# Patient Record
Sex: FEMALE | Race: WHITE | NOT HISPANIC OR LATINO | Employment: FULL TIME | URBAN - METROPOLITAN AREA
[De-identification: names, ages, dates, MRNs, and addresses within clinical notes are randomized per-mention and may not be internally consistent; named-entity substitution may affect disease eponyms.]

---

## 2017-07-05 ENCOUNTER — ALLSCRIPTS OFFICE VISIT (OUTPATIENT)
Dept: OTHER | Facility: OTHER | Age: 52
End: 2017-07-05

## 2017-07-14 ENCOUNTER — GENERIC CONVERSION - ENCOUNTER (OUTPATIENT)
Dept: OTHER | Facility: OTHER | Age: 52
End: 2017-07-14

## 2017-09-21 ENCOUNTER — GENERIC CONVERSION - ENCOUNTER (OUTPATIENT)
Dept: OTHER | Facility: OTHER | Age: 52
End: 2017-09-21

## 2017-10-26 ENCOUNTER — GENERIC CONVERSION - ENCOUNTER (OUTPATIENT)
Dept: OTHER | Facility: OTHER | Age: 52
End: 2017-10-26

## 2018-01-13 VITALS
HEART RATE: 79 BPM | SYSTOLIC BLOOD PRESSURE: 128 MMHG | TEMPERATURE: 97.1 F | DIASTOLIC BLOOD PRESSURE: 80 MMHG | BODY MASS INDEX: 22.28 KG/M2 | WEIGHT: 130.5 LBS | OXYGEN SATURATION: 97 % | RESPIRATION RATE: 16 BRPM | HEIGHT: 64 IN

## 2018-01-22 ENCOUNTER — ALLSCRIPTS OFFICE VISIT (OUTPATIENT)
Dept: OTHER | Facility: OTHER | Age: 53
End: 2018-01-22

## 2018-01-22 DIAGNOSIS — M25.551 PAIN IN RIGHT HIP: ICD-10-CM

## 2018-01-23 ENCOUNTER — APPOINTMENT (OUTPATIENT)
Dept: RADIOLOGY | Facility: MEDICAL CENTER | Age: 53
End: 2018-01-23
Payer: COMMERCIAL

## 2018-01-23 DIAGNOSIS — M25.551 PAIN IN RIGHT HIP: ICD-10-CM

## 2018-01-23 PROCEDURE — 73502 X-RAY EXAM HIP UNI 2-3 VIEWS: CPT

## 2018-01-23 NOTE — PROGRESS NOTES
Assessment   1  Right hip pain (969 45) (M25 551)   2  Anxiety (300 00) (F41 9)    Plan   Anxiety    · ALPRAZolam 0 25 MG Oral Tablet; take 1 tablet daily as needed  Right hip pain    · PrednisoLONE 15 MG/5ML Oral Solution; take 20mL PO daily x 2 days, then 15mL    PO daily x 2 days, then 10mL PO daily x 2 days, then 5mL PO daily x    2 days   · * XR HIP/PELV 2-3 VWS RIGHT W PELVIS IF PERFORMED; Status:Active; Requested    for:22Jan2018; Discussion/Summary      50-year-old female here today for concern of 3 months of right hip pain and has 3-4 episodes of some numbness and tingling in her right calf and foot mainly when stationary since her hip pain is started  Her exam is relatively unremarkable today aside from some right lateral hip tenderness but has normal range of motion of her lumbar spine and hip without instability or decreased range of motion  I do suspect possible bursitis is patient is very active with running, walking and skiing, less likely IT band syndrome as she has no lateral thigh or knee pain  At this point I will have patient start a prednisone taper as directed  I advised only acetaminophen or Tylenol in addition with risk of NSAID use with steroids discussed  advised to take with food  She was encouraged to rest from her physical activities at least for the next week, limiting to walking and stretching and avoiding running, skiing or other high impact activity or exercise  She should started ice regimen to the right lateral hip applied 20 minutes 3 to 4 times a day  I also provided her with a groin and hip stretching handout that she should perform at least 2 to 3 times a day  I will have her obtain an x-ray of the right hip two view for any obvious abnormalities  I told patient we will call her with results when they become available and follow up with her at that time   Depending on how her x-ray turns out and how she responds to medication, I may consider treatment with some physical therapy  Possible side effects of new medications were reviewed with the patient/guardian today  The treatment plan was reviewed with the patient/guardian  The patient/guardian understands and agrees with the treatment plan      Chief Complaint   Pt c/o R hip pain and tingling in the back or her R foot/calf x 1 week  History of Present Illness   HPI: 49y/o female here today for right hip pain for past few months, no trauma  no hx of hip pain  she is active walking, running, skiing and no problems  Hip pain is all the time, movements make it worse, specifically getting up from sitting to standing  also has happened episode at night that right foot and calf tingling, had similar episode today at work while standing  pain concentrated right lateral hip, tender to touch  tried ibuprofen  Patient also requesting refill for Xanax  She states that at times she has to work on her computer late and then has difficulty falling asleep after  She takes it on a rare basis, last filled August 2017  she states that she takes half of a 0 25 mg tablet  Review of Systems        Constitutional: No fever, no chills, feels well, no tiredness, no recent weight gain or loss  Musculoskeletal: as noted in HPI  Integumentary: no complaints of skin rash or lesion, no itching or dry skin, no skin wounds  Neurological: as noted in HPI  Other Symptoms: psych sxs as above  Active Problems   1  Anxiety (300 00) (F41 9)   2  Asthma (493 90) (J45 909)   3  Colon cancer screening (V76 51) (Z12 11)    Past Medical History   1  History of Dysuria (788 1) (R30 0)   2  History of acute bronchitis (V12 69) (Z87 09)   3  History of acute sinusitis (V12 69) (Z87 09)   4  History of cough   5  History of esophageal spasm (V12 79) (Z87 19)   6  History of fatigue (V13 89) (Z87 898)   7  History of skin disorder (V13 3) (Z87 2)   8  History of Left knee pain (719 46) (M25 562)   9   History of Visit For:    Social History    · Never A Smoker  The social history was reviewed and updated today  Current Meds    1  ALPRAZolam 0 25 MG Oral Tablet; take 1 tablet daily as needed; Therapy: [id number] to (Last Rx:[id number]) Ordered   2  Dulera 100-5 MCG/ACT Inhalation Aerosol; INHALE 2 PUFFS Twice daily Recorded   3  Nitrostat 0 4 MG Sublingual Tablet Sublingual; TAKE AS DIRECTED; Therapy: 85PEU7519 to (Evaluate:10Jun2013); Last Rx:[id number] Recorded   4  Omeprazole 20 MG Oral Tablet Delayed Release; TAKE 1 CAPSULE Daily Recorded   5  Ventolin  (90 Base) MCG/ACT Inhalation Aerosol Solution; INHALE 1 TO 2     PUFFS EVERY 4 TO 6 HOURS AS NEEDED; Therapy: [id number] to (Last Rx:[id number])  Requested for: [id number] Ordered     The medication list was reviewed and updated today  Allergies   1  Codeine Derivatives   2  Bactrim DS TABS    Vitals    Recorded: 94YKS3086 06:13PM   Temperature 98 4 F, Tympanic   Heart Rate 89   Respiration Quality Normal   Respiration 16   Systolic 948, LUE, Sitting   Diastolic 78, LUE, Sitting   Weight 135 lb 4 oz   BMI Calculated 23 07   BSA Calculated 1 66   O2 Saturation 99, RA   Pain Scale 5     Physical Exam        Constitutional      General appearance: No acute distress, well appearing and well nourished  appears healthy,-- within normal limits of ideal weight-- and-- appearance reflects stated age  Musculoskeletal      Gait and station: Normal        Inspection/palpation of joints, bones, and muscles: Abnormal  -- Right hip, spine and lower extremity appear normal to inspection without redness, swelling, ecchymosis or rash  There is no tenderness to palpation or palpable abnormality of the lumbar spine associated paraspinals  There is some slight tenderness to palpation in the right lateral hip joint  She has no groin tenderness  She has full range of motion of her lumbar spine in all directions without pain   She has full range of motion of her right hip in all directions without pain  Strength in her lower extremity is intact and symmetric to left  Psychiatric      Orientation to person, place, and time: Normal        Mood and affect: Normal        Additional Exam:  Vitals reviewed  Signatures    Electronically signed by : MARIPOSA Drummond; Jan 22 2018  6:52PM EST                       (Author)     Electronically signed by :  Joon Lowe DO; Jan 22 2018  9:14PM EST                       (Author)

## 2018-01-26 ENCOUNTER — TELEPHONE (OUTPATIENT)
Dept: FAMILY MEDICINE CLINIC | Facility: CLINIC | Age: 53
End: 2018-01-26

## 2018-01-26 NOTE — TELEPHONE ENCOUNTER
Please tell pt her xray is still "in process" meaning it is not read yet as of 645PM before I left the office  I will recheck Monday when I return

## 2018-01-30 DIAGNOSIS — M25.751 OSTEOPHYTE, RIGHT HIP: ICD-10-CM

## 2018-01-30 DIAGNOSIS — G89.29 CHRONIC RIGHT HIP PAIN: Primary | ICD-10-CM

## 2018-01-30 DIAGNOSIS — M25.551 CHRONIC RIGHT HIP PAIN: Primary | ICD-10-CM

## 2018-01-30 NOTE — TELEPHONE ENCOUNTER
I spoke with patient directly discussing x-ray results of her hip  There were iliac crest traction osteophytes found  Patient states that she was starting to feel better on ibuprofen, rest, ice and stretching  She did ski over the weekend and felt well  However she started her walking routine again and the pain is back  She does not notice the pain with squatting or skiing but does notice with basic movements like walking  At this point with the abnormality on the x-ray recommended 2nd opinion by an orthopedic  Patient asked for Dr Amy Renee, contact information given  Order in computer  Patient will continue ibuprofen with food as tolerated, gentle stretches, ice/heat as  Needed and will make appointment with Orthopod for further evaluation  I did advise patient confirm appointment with us so we can fax over pertinent information

## 2018-02-22 ENCOUNTER — EVALUATION (OUTPATIENT)
Dept: PHYSICAL THERAPY | Facility: CLINIC | Age: 53
End: 2018-02-22
Payer: COMMERCIAL

## 2018-02-22 DIAGNOSIS — M76.31 ILIOTIBIAL BAND SYNDROME OF RIGHT SIDE: Primary | ICD-10-CM

## 2018-02-22 PROCEDURE — G8978 MOBILITY CURRENT STATUS: HCPCS

## 2018-02-22 PROCEDURE — G8979 MOBILITY GOAL STATUS: HCPCS

## 2018-02-22 PROCEDURE — 97161 PT EVAL LOW COMPLEX 20 MIN: CPT

## 2018-02-22 RX ORDER — ALPRAZOLAM 0.25 MG/1
TABLET ORAL
COMMUNITY
End: 2018-08-10 | Stop reason: SDUPTHER

## 2018-02-22 RX ORDER — AMOXICILLIN 500 MG/1
500 CAPSULE ORAL EVERY 8 HOURS SCHEDULED
COMMUNITY
End: 2018-03-11

## 2018-02-22 RX ORDER — OMEPRAZOLE 20 MG/1
20 CAPSULE, DELAYED RELEASE ORAL DAILY
COMMUNITY

## 2018-02-22 RX ORDER — NITROGLYCERIN 0.6 MG/1
0.6 TABLET SUBLINGUAL
COMMUNITY
End: 2018-09-27 | Stop reason: SDUPTHER

## 2018-02-22 RX ORDER — CEFUROXIME AXETIL 500 MG/1
500 TABLET ORAL EVERY 12 HOURS SCHEDULED
COMMUNITY
End: 2018-03-11

## 2018-02-22 NOTE — PROGRESS NOTES
PT Evaluation     Today's date: 2018  Patient name: William Parnell  : 1965  MRN: [id number]  Referring provider: Sage Lin DO  Dx:   Encounter Diagnosis     ICD-10-CM    1  Iliotibial band syndrome of right side M76 31                   Assessment  Impairments: abnormal or restricted ROM, activity intolerance, impaired physical strength and pain with function  Functional limitations: difficulty ambulating/stair climbing, pain with standing long periods of time  Assessment details: William Parnell is a 47 y o  female presenting to PT with pain, decreased hip range of motion, decreased LE strength, and decreased tolerance to activity consistent with ITB syndrome and tightness, with associated bursitis  These impairments cause associated limitations in functional abilities  Patient would benefit from skilled PT services to address these impairments and to maximize function in order to improve quality of life  Thank you for the referral   Understanding of Dx/Px/POC: good   Prognosis: good    Goals  STG  1) R hip ROM will improve 4 in  weeks  2) B/L knee strength will improve 1/2 grade in 4 weeks  3) B/L hip strength will improve 1/2 grade in 4 weeks  LTG  1) Patient is independent in HEP  2) Patient will ascend/descend one flight of stairs independently with no increased pain in 8 weeks  3) Ambulation will be improved to PLOF in 8 weeks  4) Recreational performance will be improved to PLOF in 8 weeks      Plan  Patient would benefit from: skilled PT  Planned modality interventions: cryotherapy and thermotherapy: hydrocollator packs  Planned therapy interventions: home exercise program, therapeutic exercise, therapeutic activities, stretching, strengthening, patient education, neuromuscular re-education, manual therapy and joint mobilization  Frequency: 1x week  Duration in weeks: 8  Treatment plan discussed with: patient        Subjective Evaluation    History of Present Illness  Mechanism of injury: Patient presents with R hip pain which has lasted about 4 months, denies trauma to cause the pain to start  She reports hx of R LCL sprain about 4 years ago, and believes this may be contributing to development of ITB tightness  She enjoys walking daily, however notes that this activity causes increase in hip pain  She also reports occasional numbness in sole of her R foot, usually coming on after standing on it for a while at work  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 her leg in order for the numbness to subside  Quality of life: good    Pain  Current pain rating: 3  At best pain ratin  At worst pain ratin  Quality: dull ache and discomfort  Relieving factors: change in position and rest  Aggravating factors: walking, stair climbing and running  Progression: no change      Diagnostic Tests  X-ray: normal (osteophytes on R iliac crest)  Patient Goals  Patient goals for therapy: decreased pain, return to sport/leisure activities and increased strength          Objective     Palpation     Right   No palpable tenderness to the gluteus luis angel, gluteus medius, piriformis and TFL  Additional Palpation Details  Tenderness and hypertonicity of distal R ITB, just proximal to insertion onto tibia    Tenderness     Left Hip   No tenderness in the iliac crest      Right Hip   No tenderness in the iliac crest      Lumbar Screen  Lumbar range of motion within normal limits  Neurological Testing     Additional Neurological Details  Sensation intact and reflexes normal throughout      Active Range of Motion   Left Hip   Normal active range of motion  Adduction: 20 degrees   Internal rotation (90/90): 25 degrees     Right Hip   Normal active range of motion  Adduction: 15 degrees   Internal rotation (90/90): 20 degrees     Strength/Myotome Testing     Right Hip   Planes of Motion   Flexion: 4  Extension: 4-  Abduction: 4-    Isolated Muscles   Gluteus maximums: 4-    Additional Strength Details  Knee strength 4+/5 B/L    Tests     Right Hip   Positive modified Lenton Gilbert and Lenton Gilbert  Negative EKTA, FADIR and piriformis  SLR: Negative       General Comments     Hip Comments   FOTO score: 60      Flowsheet Rows    Flowsheet Row Most Recent Value   PT/OT G-Codes   Current Score  60   Projected Score  73   FOTO information reviewed  Yes   Assessment Type  Evaluation   G code set  Mobility: Walking & Moving Around   Mobility: Walking and Moving Around Current Status ()  CK   Mobility: Walking and Moving Around Goal Status ()  CJ          Precautions: none    Daily Treatment Diary     Manual              Roller to TFL/ITB                                                                     Exercise Diary              Hook lying hip abduction             Hook lying hip adduction             Glute bridge             SLR flex             SLR abduction             Clam shells             Standing ITB stretch             Iso hip hike             Pball at back squats             Walking march             Side steps with band around thighs                                                                                                                         Modalities              CP/MHP prn

## 2018-02-22 NOTE — LETTER
2018    Susi Sheth DO  [address] [facility]    Patient: Brandy Santos   YOB: 1965   Date of Visit: 2018     Encounter Diagnosis     ICD-10-CM    1  Iliotibial band syndrome of right side M76 31        Dear Dr Petrona Ortiz:    Please review the attached Plan of Care from [facility] recent visit  Please verify that you agree therapy should continue by signing the attached document and sending it back to our office  If you have any questions or concerns, please don't hesitate to call  Sincerely,    Cynthia Tan, PT      Referring Provider:      I certify that I have read the below Plan of Care and certify the need for these services furnished under this plan of treatment while under my care  Susi Sheth DO  [address] [place] [address]: [phone]          PT Evaluation     Today's date: 2018  Patient name: Janice Nielson  : 1965  MRN: [id number]  Referring provider: Hema Urbano DO  Dx:   Encounter Diagnosis     ICD-10-CM    1  Iliotibial band syndrome of right side M76 31                   Assessment  Impairments: abnormal or restricted ROM, activity intolerance, impaired physical strength and pain with function  Functional limitations: difficulty ambulating/stair climbing, pain with standing long periods of time  Assessment details: Janice Nielson is a 47 y o  female presenting to PT with pain, decreased hip range of motion, decreased LE strength, and decreased tolerance to activity consistent with ITB syndrome and tightness, with associated bursitis  These impairments cause associated limitations in functional abilities  Patient would benefit from skilled PT services to address these impairments and to maximize function in order to improve quality of life   Thank you for the referral   Understanding of Dx/Px/POC: good   Prognosis: good    Goals  STG  1) R hip ROM will improve 4 in weeks  2) B/L knee strength will improve 1/2 grade in 4 weeks  3) B/L hip strength will improve 1/2 grade in 4 weeks  LTG  1) Patient is independent in HEP  2) Patient will ascend/descend one flight of stairs independently with no increased pain in 8 weeks  3) Ambulation will be improved to PLOF in 8 weeks  4) Recreational performance will be improved to PLOF in 8 weeks  Plan  Patient would benefit from: skilled PT  Planned modality interventions: cryotherapy and thermotherapy: hydrocollator packs  Planned therapy interventions: home exercise program, therapeutic exercise, therapeutic activities, stretching, strengthening, patient education, neuromuscular re-education, manual therapy and joint mobilization  Frequency: 1x week  Duration in weeks: 8  Treatment plan discussed with: patient        Subjective Evaluation    History of Present Illness  Mechanism of injury: Patient presents with R hip pain which has lasted about 4 months, denies trauma to cause the pain to start  She reports hx of R LCL sprain about 4 years ago, and believes this may be contributing to development of ITB tightness  She enjoys walking daily, however notes that this activity causes increase in hip pain  She also reports occasional numbness in sole of her R foot, usually coming on after standing on it for a while at work  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 her leg in order for the numbness to subside    Quality of life: good    Pain  Current pain rating: 3  At best pain ratin  At worst pain ratin  Quality: dull ache and discomfort  Relieving factors: change in position and rest  Aggravating factors: walking, stair climbing and running  Progression: no change      Diagnostic Tests  X-ray: normal (osteophytes on R iliac crest)  Patient Goals  Patient goals for therapy: decreased pain, return to sport/leisure activities and increased strength          Objective     Palpation     Right   No palpable tenderness to the gluteus luis angel, gluteus medius, piriformis and TFL  Additional Palpation Details  Tenderness and hypertonicity of distal R ITB, just proximal to insertion onto tibia    Tenderness     Left Hip   No tenderness in the iliac crest      Right Hip   No tenderness in the iliac crest      Lumbar Screen  Lumbar range of motion within normal limits  Neurological Testing     Additional Neurological Details  Sensation intact and reflexes normal throughout  Active Range of Motion   Left Hip   Normal active range of motion  Adduction: 20 degrees   Internal rotation (90/90): 25 degrees     Right Hip   Normal active range of motion  Adduction: 15 degrees   Internal rotation (90/90): 20 degrees     Strength/Myotome Testing     Right Hip   Planes of Motion   Flexion: 4  Extension: 4-  Abduction: 4-    Isolated Muscles   Gluteus maximums: 4-    Additional Strength Details  Knee strength 4+/5 B/L    Tests     Right Hip   Positive modified Nikole Hush and Sam  Negative EKTA, FADIR and piriformis  SLR: Negative       General Comments     Hip Comments   FOTO score: 60      Flowsheet Rows    Flowsheet Row Most Recent Value   PT/OT G-Codes   Current Score  60   Projected Score  73   FOTO information reviewed  Yes   Assessment Type  Evaluation   G code set  Mobility: Walking & Moving Around   Mobility: Walking and Moving Around Current Status ()  CK   Mobility: Walking and Moving Around Goal Status ()  CJ          Precautions: none    Daily Treatment Diary     Manual              Roller to TFL/ITB                                                                     Exercise Diary              Hook lying hip abduction             Hook lying hip adduction             Glute bridge             SLR flex             SLR abduction             Clam shells             Standing ITB stretch             Iso hip hike             Pball at back squats             Walking march             Side steps with band around thighs Modalities              CP/MHP prn

## 2018-03-08 ENCOUNTER — APPOINTMENT (OUTPATIENT)
Dept: PHYSICAL THERAPY | Facility: CLINIC | Age: 53
End: 2018-03-08
Payer: COMMERCIAL

## 2018-03-11 ENCOUNTER — OFFICE VISIT (OUTPATIENT)
Dept: FAMILY MEDICINE CLINIC | Facility: CLINIC | Age: 53
End: 2018-03-11
Payer: COMMERCIAL

## 2018-03-11 VITALS
HEIGHT: 63 IN | BODY MASS INDEX: 24.03 KG/M2 | OXYGEN SATURATION: 97 % | RESPIRATION RATE: 16 BRPM | TEMPERATURE: 97.9 F | WEIGHT: 135.6 LBS | SYSTOLIC BLOOD PRESSURE: 132 MMHG | HEART RATE: 97 BPM | DIASTOLIC BLOOD PRESSURE: 76 MMHG

## 2018-03-11 DIAGNOSIS — M54.50 ACUTE LEFT-SIDED LOW BACK PAIN WITHOUT SCIATICA: Primary | ICD-10-CM

## 2018-03-11 PROCEDURE — 99214 OFFICE O/P EST MOD 30 MIN: CPT | Performed by: FAMILY MEDICINE

## 2018-03-11 RX ORDER — CYCLOBENZAPRINE HCL 5 MG
5 TABLET ORAL
Qty: 20 TABLET | Refills: 0 | Status: SHIPPED | OUTPATIENT
Start: 2018-03-11 | End: 2018-03-11 | Stop reason: SDUPTHER

## 2018-03-11 RX ORDER — CYCLOBENZAPRINE HCL 5 MG
5 TABLET ORAL
Qty: 20 TABLET | Refills: 0 | Status: SHIPPED | OUTPATIENT
Start: 2018-03-11 | End: 2018-04-04 | Stop reason: SDUPTHER

## 2018-03-11 NOTE — PROGRESS NOTES
Assessment/Plan:   1  Acute left-sided low back pain without sciatica  Patient's symptoms today appear likely secondary to acute strain  At this time, hold off on checking x-ray imaging  Start supportive care  Maintain hydration  Elevate legs at nighttime  May alternate between he denies  She was given a prescription for Flexeril to take  She states that she does have prednisone from a previous hip strain  She did not take this treatment  At this time, she was instructed on the proper use of her prednisone  if any symptoms should worsen, she was advised to follow up  - cyclobenzaprine (FLEXERIL) 5 mg tablet; Take 1 tablet (5 mg total) by mouth daily at bedtime  Dispense: 20 tablet; Refill: 0  She   Diagnoses and all orders for this visit:    Acute left-sided low back pain without sciatica  -     Discontinue: cyclobenzaprine (FLEXERIL) 5 mg tablet; Take 1 tablet (5 mg total) by mouth daily at bedtime  -     cyclobenzaprine (FLEXERIL) 5 mg tablet; Take 1 tablet (5 mg total) by mouth daily at bedtime    Other orders  -     Mometasone Furo-Formoterol Fum (DULERA) 100-5 MCG/ACT AERO; Inhale 2 puffs 2 (two) times a day          Subjective:  Chief Complaint   Patient presents with    Back Pain     x10d Pt states she took ibuprofen w/ little relief   Hip Pain     x10d    Leg Pain     bilateral x10d      Patient ID: Meggan Salcedo is a 47 y o  female  Back Pain   This is a new problem  The current episode started in the past 7 days  The problem occurs constantly  The problem is unchanged  The pain is present in the lumbar spine  The quality of the pain is described as aching  The pain radiates to the left thigh  The pain is at a severity of 7/10  The pain is mild  Associated symptoms include leg pain   Pertinent negatives include no abdominal pain, bladder incontinence, bowel incontinence, chest pain, dysuria, fever, headaches, numbness, pelvic pain, perianal numbness, tingling, weakness or weight loss  She has tried NSAIDs for the symptoms  The treatment provided mild relief  Hip Pain    Pertinent negatives include no numbness or tingling  Leg Pain    Pertinent negatives include no numbness or tingling  Review of Systems   Constitutional: Negative for activity change, chills, fatigue, fever and weight loss  HENT: Negative for congestion, ear pain, sinus pressure and sore throat  Eyes: Negative for redness, itching and visual disturbance  Respiratory: Negative for cough and shortness of breath  Cardiovascular: Negative for chest pain and palpitations  Gastrointestinal: Negative for abdominal pain, bowel incontinence, diarrhea and nausea  Endocrine: Negative for cold intolerance and heat intolerance  Genitourinary: Negative for bladder incontinence, dysuria, flank pain, frequency and pelvic pain  Musculoskeletal: Positive for back pain  Negative for arthralgias, gait problem and myalgias  Skin: Negative for color change  Allergic/Immunologic: Negative for environmental allergies  Neurological: Negative for dizziness, tingling, weakness, numbness and headaches  Psychiatric/Behavioral: Negative for behavioral problems and sleep disturbance  The following portions of the patient's history were reviewed and updated as appropriate : past family history, past medical history, past social history and past surgical history  Objective:  Vitals:    03/11/18 1039   BP: 132/76   BP Location: Left arm   Patient Position: Sitting   Cuff Size: Standard   Pulse: 97   Resp: 16   Temp: 97 9 °F (36 6 °C)   TempSrc: Tympanic   SpO2: 97%   Weight: 61 5 kg (135 lb 9 6 oz)   Height: 5' 3 2" (1 605 m)        Physical Exam   Constitutional: She is oriented to person, place, and time  She appears well-developed and well-nourished  HENT:   Head: Normocephalic and atraumatic  Nose: Nose normal    Mouth/Throat: No oropharyngeal exudate     Eyes: Pupils are equal, round, and reactive to light  Right eye exhibits no discharge  Left eye exhibits no discharge  Neck: Normal range of motion  Neck supple  No tracheal deviation present  Cardiovascular: Normal rate, regular rhythm and intact distal pulses  Exam reveals no gallop and no friction rub  No murmur heard  Pulses:       Dorsalis pedis pulses are 2+ on the right side, and 2+ on the left side  Posterior tibial pulses are 2+ on the right side, and 2+ on the left side  Pulmonary/Chest: Effort normal and breath sounds normal  No respiratory distress  She has no wheezes  She has no rales  Abdominal: Soft  Bowel sounds are normal  She exhibits no distension  There is no tenderness  There is no rebound and no guarding  Musculoskeletal: She exhibits no edema  Lumbar back: She exhibits decreased range of motion, tenderness and spasm  She exhibits no swelling  Back:    Lymphadenopathy:        Head (right side): No submental and no submandibular adenopathy present  Head (left side): No submental and no submandibular adenopathy present  She has no cervical adenopathy  Right cervical: No superficial cervical, no deep cervical and no posterior cervical adenopathy present  Left cervical: No superficial cervical, no deep cervical and no posterior cervical adenopathy present  Neurological: She is alert and oriented to person, place, and time  No cranial nerve deficit or sensory deficit  Skin: Skin is warm, dry and intact  Psychiatric: Her speech is normal and behavior is normal  Judgment normal  Her mood appears not anxious  Cognition and memory are normal  She does not exhibit a depressed mood  Vitals reviewed

## 2018-03-19 ENCOUNTER — TRANSCRIBE ORDERS (OUTPATIENT)
Dept: ADMINISTRATIVE | Facility: HOSPITAL | Age: 53
End: 2018-03-19

## 2018-03-19 DIAGNOSIS — M76.31 ILIOTIBIAL BAND SYNDROME OF RIGHT SIDE: Primary | ICD-10-CM

## 2018-03-22 ENCOUNTER — OFFICE VISIT (OUTPATIENT)
Dept: PHYSICAL THERAPY | Facility: CLINIC | Age: 53
End: 2018-03-22
Payer: COMMERCIAL

## 2018-03-22 DIAGNOSIS — M53.3 SI (SACROILIAC) JOINT DYSFUNCTION: ICD-10-CM

## 2018-03-22 DIAGNOSIS — M76.31 ILIOTIBIAL BAND SYNDROME OF RIGHT SIDE: Primary | ICD-10-CM

## 2018-03-22 PROCEDURE — 97112 NEUROMUSCULAR REEDUCATION: CPT

## 2018-03-22 PROCEDURE — 97110 THERAPEUTIC EXERCISES: CPT

## 2018-03-22 PROCEDURE — G8979 MOBILITY GOAL STATUS: HCPCS

## 2018-03-22 PROCEDURE — 97164 PT RE-EVAL EST PLAN CARE: CPT

## 2018-03-22 PROCEDURE — G8978 MOBILITY CURRENT STATUS: HCPCS

## 2018-03-22 NOTE — LETTER
2018    Fernandojuan Ratliff DO  [address] [facility]    Patient: Flora Santos   YOB: 1965   Date of Visit: 3/22/2018     Encounter Diagnosis     ICD-10-CM    1  Iliotibial band syndrome of right side M76 31    2  SI (sacroiliac) joint dysfunction M53 3        Dear Dr Marino Bae:    Please review the attached Plan of Care from [facility] recent visit  Please verify that you agree therapy should continue by signing the attached document and sending it back to our office  If you have any questions or concerns, please don't hesitate to call  Sincerely,    Suhail Tan, PT      Referring Provider:      I certify that I have read the below Plan of Care and certify the need for these services furnished under this plan of treatment while under my care  Fernando Ratliff DO  [address]: [phone]          PT Re-Evaluation     Today's date: 3/22/2018  Patient name: Joan Parikh  : 1965  MRN: [id number]  Referring provider: Star Smith DO  Dx:   Encounter Diagnosis     ICD-10-CM    1  Iliotibial band syndrome of right side M76 31    2  SI (sacroiliac) joint dysfunction M53 3                   Assessment  Impairments: impaired physical strength and pain with function  Functional limitations: pain with walking greater than 20 minutes on uneven surfaces  Assessment details: Joan Parikh has been compliant with attending PT and HEP since initial eval  Joan Parikh has made improvements in objective data since IE and ITB pain has resolved  She presents now with acute L SIJ dysfunction pain and would benefit from additional skilled PT to address these deficits to return to PLOF  Understanding of Dx/Px/POC: good   Prognosis: good    Goals  STG  1) R hip ROM will improve in 4 weeks  -Met  2) B/L knee strength will improve 1/2 grade in 4 weeks  -Met  3) B/L hip strength will improve 1/2 grade in 4 weeks  -Met  LTG  1) Patient is independent in [facility]  -Partially met  2) Patient will ascend/descend one flight of stairs independently with no increased pain in 8 weeks  -Met  3) Ambulation will be improved to PLOF in 8 weeks  -Partially met  4) Recreational performance will be improved to PLOF in 8 weeks  -Partially met    Plan  Patient would benefit from: skilled PT  Planned modality interventions: cryotherapy and thermotherapy: hydrocollator packs  Planned therapy interventions: home exercise program, therapeutic exercise, therapeutic activities, stretching, strengthening, patient education, neuromuscular re-education, manual therapy and joint mobilization  Frequency: 1x week  Duration in weeks: 4  Treatment plan discussed with: patient        Subjective Evaluation    History of Present Illness  Mechanism of injury: Patient presents 4 weeks after IE, reporting resolution of R ITB pain and has gotten back to her daily morning walks without increase in R hip/thigh pain  She does now admit that 2 weeks ago she jammed her LLE when she was walking down steps in the dark, and woke up the next morning with severe pain over L PSIS which lasted until 2 days ago  She had consult with orthopedics, where she was evaluated and given stretches, and reports that 2 days ago when she was completing her stretches, she felt a pop and immediate resolution of the PSIS pain  She would like to know some stretches and core strength to prevent recurrence of injury    Quality of life: good    Pain  Current pain ratin  At best pain ratin  At worst pain rating: 3  Quality: discomfort and sharp  Relieving factors: change in position and rest  Aggravating factors: walking and running  Progression: improved      Diagnostic Tests  X-ray: normal (osteophytes on R iliac crest)  Patient Goals  Patient goals for therapy: decreased pain, return to sport/leisure activities and increased strength          Objective     Palpation     Right   No palpable tenderness to the gluteus luis angel, gluteus medius, piriformis and TFL  Tenderness     Left Hip   No tenderness in the iliac crest      Right Hip   No tenderness in the iliac crest      Lumbar Screen  Lumbar range of motion within normal limits  Neurological Testing     Additional Neurological Details  Sensation intact and reflexes normal throughout  Active Range of Motion   Left Hip   Normal active range of motion    Right Hip   Normal active range of motion    Strength/Myotome Testing     Right Hip   Planes of Motion   Flexion: 4+  Extension: 4+  Abduction: 4+    Isolated Muscles   Gluteus maximums: 4+    Additional Strength Details  Knee strength 4+/5 B/L    Tests     Left Hip   Negative EKTA, piriformis, SI compression and SI distraction  Right Hip   Negative EKTA, FADIR, modified Sam, Sam and piriformis  SLR: Negative  General Comments     Hip Comments   FOTO score for R ITB improved from 60 to 78 in 2 visits    FOTO score for L SIJ: 57       Flowsheet Rows    Flowsheet Row Most Recent Value   PT/OT G-Codes   Current Score  78   Projected Score  73   FOTO information reviewed  Yes   Assessment Type  Re-evaluation   G code set  Mobility: Walking & Moving Around   Mobility: Walking and Moving Around Current Status ()  CJ   Mobility: Walking and Moving Around Goal Status ()  CJ        Precautions: none     Daily Treatment Diary      Manual                                                                                                                                                     Exercise Diary   3/22                     Hook lying hip abduction 5" 2x10                     Hook lying hip adduction 5" 2x10                     Glute bridge 2x10                     PPT with march 10 ea                     PPT with leg ext NV                     Clam shells 2x10                     Standing ITB stretch R 30"x3                      Iso hip hike 15"x3 each                     Pball at back squats 2x10                     Prone quad sets 5" x10                     Side steps with band around thighs No band, 25' x4                     MET for L SIJ dysfunction 5" x10 each                                                                                                                                                                                                   Modalities                        CP/MHP prn

## 2018-04-04 DIAGNOSIS — M54.50 ACUTE LEFT-SIDED LOW BACK PAIN WITHOUT SCIATICA: ICD-10-CM

## 2018-04-04 RX ORDER — CYCLOBENZAPRINE HCL 5 MG
5 TABLET ORAL
Qty: 20 TABLET | Refills: 0 | Status: SHIPPED | OUTPATIENT
Start: 2018-04-04 | End: 2018-09-27 | Stop reason: ALTCHOICE

## 2018-07-01 ENCOUNTER — OFFICE VISIT (OUTPATIENT)
Dept: FAMILY MEDICINE CLINIC | Facility: CLINIC | Age: 53
End: 2018-07-01
Payer: COMMERCIAL

## 2018-07-01 VITALS
HEIGHT: 64 IN | HEART RATE: 103 BPM | DIASTOLIC BLOOD PRESSURE: 80 MMHG | OXYGEN SATURATION: 98 % | TEMPERATURE: 97.8 F | WEIGHT: 135.25 LBS | SYSTOLIC BLOOD PRESSURE: 126 MMHG | BODY MASS INDEX: 23.09 KG/M2 | RESPIRATION RATE: 18 BRPM

## 2018-07-01 DIAGNOSIS — J01.90 ACUTE SINUSITIS, RECURRENCE NOT SPECIFIED, UNSPECIFIED LOCATION: Primary | ICD-10-CM

## 2018-07-01 PROCEDURE — 99214 OFFICE O/P EST MOD 30 MIN: CPT | Performed by: FAMILY MEDICINE

## 2018-07-01 RX ORDER — LEVOFLOXACIN 500 MG/1
500 TABLET, FILM COATED ORAL EVERY 24 HOURS
Qty: 7 TABLET | Refills: 0 | Status: SHIPPED | OUTPATIENT
Start: 2018-07-01 | End: 2018-07-08

## 2018-07-01 RX ORDER — BUDESONIDE AND FORMOTEROL FUMARATE DIHYDRATE 80; 4.5 UG/1; UG/1
2 AEROSOL RESPIRATORY (INHALATION) 2 TIMES DAILY
COMMUNITY
Start: 2018-05-31

## 2018-07-01 NOTE — PROGRESS NOTES
Assessment/Plan:   1  Acute sinusitis, recurrence not specified, unspecified location  Start supportive care  Maintain hydration  Take over-the-counter Mucinex for symptom relief  Start treatment with a nasal steroid  Start treatment as well with Levaquin 5 mg daily for 7 days  Follow up if any symptoms persist   - levofloxacin (LEVAQUIN) 500 mg tablet; Take 1 tablet (500 mg total) by mouth every 24 hours for 7 days  Dispense: 7 tablet; Refill: 0     There are no diagnoses linked to this encounter  Subjective:    Chief Complaint   Patient presents with    Cold Like Symptoms     sinus pressure, congestion, cough, and R earache x 7 days   Has taken Mucinex and cough drops with some relief        Patient ID: Giorgio Figueroa is a 46 y o  female  URI    This is a new problem  Episode onset: 7 days ago  The problem has been unchanged  There has been no fever  Associated symptoms include congestion, coughing, ear pain, rhinorrhea and sinus pain  Pertinent negatives include no abdominal pain, chest pain, diarrhea, dysuria, headaches, nausea, sore throat or wheezing  She has tried inhaler use (mucinex) for the symptoms  The treatment provided mild relief  Review of Systems   Constitutional: Negative for activity change, chills, fatigue and fever  HENT: Positive for congestion, ear pain, rhinorrhea and sinus pain  Negative for sinus pressure and sore throat  Eyes: Negative for redness, itching and visual disturbance  Respiratory: Positive for cough  Negative for shortness of breath and wheezing  Cardiovascular: Negative for chest pain and palpitations  Gastrointestinal: Negative for abdominal pain, diarrhea and nausea  Endocrine: Negative for cold intolerance and heat intolerance  Genitourinary: Negative for dysuria, flank pain and frequency  Musculoskeletal: Negative for arthralgias, back pain, gait problem and myalgias  Skin: Negative for color change     Allergic/Immunologic: Negative for environmental allergies  Neurological: Negative for dizziness, numbness and headaches  Psychiatric/Behavioral: Negative for behavioral problems and sleep disturbance  The following portions of the patient's history were reviewed and updated as appropriate : past family history, past medical history, past social history and past surgical history  Current Outpatient Prescriptions:     ALPRAZolam (XANAX) 0 25 mg tablet, Take by mouth daily at bedtime as needed for anxiety, Disp: , Rfl:     budesonide-formoterol (SYMBICORT) 80-4 5 MCG/ACT inhaler, Inhale 2 puffs 2 (two) times a day, Disp: , Rfl:     Mometasone Furo-Formoterol Fum (DULERA) 100-5 MCG/ACT AERO, Inhale 2 puffs 2 (two) times a day, Disp: , Rfl:     nitroglycerin (NITROSTAT) 0 6 mg SL tablet, Place 0 6 mg under the tongue every 5 (five) minutes as needed for chest pain, Disp: , Rfl:     omeprazole (PriLOSEC) 20 mg delayed release capsule, Take 20 mg by mouth daily, Disp: , Rfl:     cyclobenzaprine (FLEXERIL) 5 mg tablet, TAKE 1 TABLET (5 MG TOTAL) BY MOUTH DAILY AT BEDTIME, Disp: 20 tablet, Rfl: 0    Objective:    Vitals:    07/01/18 1011   BP: 126/80   BP Location: Left arm   Patient Position: Sitting   Cuff Size: Adult   Pulse: 103   Resp: 18   Temp: 97 8 °F (36 6 °C)   TempSrc: Tympanic   SpO2: 98%   Weight: 61 3 kg (135 lb 4 oz)   Height: 5' 4" (1 626 m)        Physical Exam   Constitutional: She is oriented to person, place, and time  She appears well-developed and well-nourished  HENT:   Head: Normocephalic and atraumatic  Nose: Nose normal    Mouth/Throat: No oropharyngeal exudate  Eyes: Pupils are equal, round, and reactive to light  Right eye exhibits no discharge  Left eye exhibits no discharge  Neck: Normal range of motion  Neck supple  No tracheal deviation present  Cardiovascular: Normal rate, regular rhythm and intact distal pulses  Exam reveals no gallop and no friction rub  No murmur heard    Pulses: Dorsalis pedis pulses are 2+ on the right side, and 2+ on the left side  Posterior tibial pulses are 2+ on the right side, and 2+ on the left side  Pulmonary/Chest: Effort normal and breath sounds normal  No respiratory distress  She has no wheezes  She has no rales  Abdominal: Soft  Bowel sounds are normal  She exhibits no distension  There is no tenderness  There is no rebound and no guarding  Musculoskeletal: Normal range of motion  She exhibits no edema  Lymphadenopathy:        Head (right side): No submental and no submandibular adenopathy present  Head (left side): No submental and no submandibular adenopathy present  She has no cervical adenopathy  Right cervical: No superficial cervical, no deep cervical and no posterior cervical adenopathy present  Left cervical: No superficial cervical, no deep cervical and no posterior cervical adenopathy present  Neurological: She is alert and oriented to person, place, and time  No cranial nerve deficit or sensory deficit  Skin: Skin is warm, dry and intact  Psychiatric: Her speech is normal and behavior is normal  Judgment normal  Her mood appears not anxious  Cognition and memory are normal  She does not exhibit a depressed mood  Vitals reviewed

## 2018-08-10 DIAGNOSIS — F41.9 ANXIETY: Primary | ICD-10-CM

## 2018-08-11 RX ORDER — ALPRAZOLAM 0.25 MG/1
0.25 TABLET ORAL
Qty: 30 TABLET | Refills: 0 | Status: SHIPPED | OUTPATIENT
Start: 2018-08-11 | End: 2019-03-16 | Stop reason: SDUPTHER

## 2018-09-27 ENCOUNTER — OFFICE VISIT (OUTPATIENT)
Dept: FAMILY MEDICINE CLINIC | Facility: CLINIC | Age: 53
End: 2018-09-27
Payer: COMMERCIAL

## 2018-09-27 VITALS
HEART RATE: 87 BPM | DIASTOLIC BLOOD PRESSURE: 84 MMHG | HEIGHT: 63 IN | WEIGHT: 131.9 LBS | BODY MASS INDEX: 23.37 KG/M2 | SYSTOLIC BLOOD PRESSURE: 112 MMHG | OXYGEN SATURATION: 99 % | TEMPERATURE: 97.8 F | RESPIRATION RATE: 18 BRPM

## 2018-09-27 DIAGNOSIS — Z11.59 ENCOUNTER FOR HEPATITIS C SCREENING TEST FOR LOW RISK PATIENT: ICD-10-CM

## 2018-09-27 DIAGNOSIS — R00.2 PALPITATIONS: ICD-10-CM

## 2018-09-27 DIAGNOSIS — Z00.00 WELL ADULT EXAM: Primary | ICD-10-CM

## 2018-09-27 DIAGNOSIS — K22.4 ESOPHAGEAL SPASM: ICD-10-CM

## 2018-09-27 DIAGNOSIS — E78.2 MIXED HYPERLIPIDEMIA: ICD-10-CM

## 2018-09-27 PROBLEM — F41.9 ANXIETY: Status: ACTIVE | Noted: 2017-08-22

## 2018-09-27 PROCEDURE — 99396 PREV VISIT EST AGE 40-64: CPT | Performed by: FAMILY MEDICINE

## 2018-09-27 RX ORDER — NITROGLYCERIN 0.6 MG/1
0.6 TABLET SUBLINGUAL
Qty: 90 TABLET | Refills: 1 | Status: SHIPPED | OUTPATIENT
Start: 2018-09-27 | End: 2022-04-20 | Stop reason: SDUPTHER

## 2018-09-27 NOTE — PROGRESS NOTES
Assessment/Plan:    Patient is 13-year-old female seen for a well visit  She does have concern about coronary artery disease  Her father  suddenly at home with no history coronary artery disease  She has an aunt who had bypass surgery none of these family members were smokers  Patient does at times have pressure across her mid back with shortness of breath  She also experiences palpitations  An EKG in the office was negative  I am going to order a stress echocardiogram   She sees Dermatology for annual skin checkups  She sees gyn  I gave her orders for fasting blood work  Diagnoses and all orders for this visit:    Well adult exam    Mixed hyperlipidemia  -     Lipid Panel with Direct LDL reflex; Future  -     Comprehensive metabolic panel; Future  -     TSH, 3rd generation with Free T4 reflex; Future  -     CBC and differential; Future    Palpitations  -     Lipid Panel with Direct LDL reflex; Future  -     Comprehensive metabolic panel; Future  -     TSH, 3rd generation with Free T4 reflex; Future  -     CBC and differential; Future  -     POCT ECG  -     Echo stress test w contrast if indicated; Future    Esophageal spasm  -     nitroglycerin (NITROSTAT) 0 6 mg SL tablet; Place 1 tablet (0 6 mg total) under the tongue every 5 (five) minutes as needed for chest pain    Encounter for hepatitis C screening test for low risk patient  -     Cancel: Hepatitis C antibody; Future  -     Hepatitis C antibody  -     Hepatitis C antibody; Future          Subjective:   Chief Complaint   Patient presents with    Physical Exam        Patient ID: Janice Nielson is a 46 y o  female  Patient is here for a well visit  Father had a fatal MI a month ago  His sister had a triple bypass  She is concerned about family cardiac history  Also gets a band of pressure across her back where her bra would be after exercise, exerting herself  Has heart palpitations associated with this          The following portions of the patient's history were reviewed and updated as appropriate: allergies, current medications, past family history, past medical history, past social history, past surgical history and problem list     Review of Systems   Constitutional: Negative for chills and fever  HENT: Negative for congestion and sore throat  Respiratory: Positive for shortness of breath  Negative for chest tightness  Cardiovascular: Positive for palpitations  Negative for chest pain  Pressure in back with exercise  Gastrointestinal: Positive for constipation  Negative for abdominal pain, diarrhea and nausea  Genitourinary: Negative for difficulty urinating  Skin: Negative  Neurological: Positive for headaches  Negative for dizziness  Psychiatric/Behavioral: Negative  Objective:      /84 (BP Location: Left arm, Patient Position: Sitting, Cuff Size: Large)   Pulse 87   Temp 97 8 °F (36 6 °C) (Tympanic)   Resp 18   Ht 5' 2 99" (1 6 m)   Wt 59 8 kg (131 lb 14 4 oz)   LMP  (LMP Unknown)   SpO2 99%   Breastfeeding? No   BMI 23 37 kg/m²          Physical Exam   Constitutional: She is oriented to person, place, and time  She appears well-developed and well-nourished  HENT:   Head: Normocephalic  Right Ear: Tympanic membrane normal    Left Ear: Tympanic membrane normal    Nose: Nose normal    Mouth/Throat: Oropharynx is clear and moist and mucous membranes are normal    Eyes: Pupils are equal, round, and reactive to light  Neck: Normal range of motion  No thyromegaly present  Cardiovascular: Normal rate, regular rhythm, normal heart sounds and intact distal pulses  Pulmonary/Chest: Effort normal and breath sounds normal    Abdominal: Soft  Bowel sounds are normal  There is no tenderness  Musculoskeletal: Normal range of motion  She exhibits no edema  Lymphadenopathy:     She has no cervical adenopathy  Neurological: She is alert and oriented to person, place, and time   She has normal reflexes  Skin: Skin is warm and dry  Psychiatric: She has a normal mood and affect  Nursing note and vitals reviewed

## 2018-10-01 ENCOUNTER — TELEPHONE (OUTPATIENT)
Dept: FAMILY MEDICINE CLINIC | Facility: CLINIC | Age: 53
End: 2018-10-01

## 2018-10-01 PROCEDURE — 93000 ELECTROCARDIOGRAM COMPLETE: CPT | Performed by: FAMILY MEDICINE

## 2018-10-01 NOTE — TELEPHONE ENCOUNTER
How about an echocardiogram without the stress test  I did not know that it would get approved either, I figured I'd see what you found out

## 2018-10-01 NOTE — TELEPHONE ENCOUNTER
The echo also needs an approval   I tried to get it approved but it would have gone in to peer to peer  I withdrew it  If you have a few minutes tomorrow I can have you review the questions they are asking  Maybe I am not picking the correct ones

## 2018-10-01 NOTE — TELEPHONE ENCOUNTER
I started the approval for the stress echo but I withdrew it  I was not going to get this authorized  There were risk factor and symtom questions asked  And because her ekg was normal and she is not on any meds for htn or cholesterol  it just did not look good for an approval   Pt is aware this may not get approved  She is ok with that  I can still submit it and see what they say or did you want to order a different test?  Pt is aware you are not in the office today

## 2018-10-02 DIAGNOSIS — R00.2 PALPITATIONS: Primary | ICD-10-CM

## 2018-10-02 NOTE — TELEPHONE ENCOUNTER
ECHO APPROVED FOR [facility]  Mckayla Clemens # [id number], EXP 11/30/18  PT TO SCHEDULE APPT  STRESS ECHO CANCELED

## 2018-10-11 ENCOUNTER — TELEPHONE (OUTPATIENT)
Dept: FAMILY MEDICINE CLINIC | Facility: CLINIC | Age: 53
End: 2018-10-11

## 2018-10-11 NOTE — TELEPHONE ENCOUNTER
Pt's spouse is requesting a call to the pt to review her lab orders  Pt has questions she wants tos review with Dr Sumi Santamaria   Please call pt at [phone]

## 2018-10-12 NOTE — TELEPHONE ENCOUNTER
Addie, I spoke to patient  I recommended she have an A1c  Can she come in and have an in office fingerstick or must we draw more blood and send to [facility]?Can you let her know? Thanks

## 2018-11-02 ENCOUNTER — CLINICAL SUPPORT (OUTPATIENT)
Dept: FAMILY MEDICINE CLINIC | Facility: CLINIC | Age: 53
End: 2018-11-02
Payer: COMMERCIAL

## 2018-11-02 DIAGNOSIS — R73.9 ELEVATED BLOOD SUGAR: Primary | ICD-10-CM

## 2018-11-02 LAB — SL AMB POCT HEMOGLOBIN AIC: 5.4

## 2018-11-02 PROCEDURE — 83036 HEMOGLOBIN GLYCOSYLATED A1C: CPT | Performed by: FAMILY MEDICINE

## 2018-11-12 ENCOUNTER — HOSPITAL ENCOUNTER (OUTPATIENT)
Dept: NON INVASIVE DIAGNOSTICS | Facility: CLINIC | Age: 53
Discharge: HOME | End: 2018-11-12
Payer: COMMERCIAL

## 2018-11-12 DIAGNOSIS — R00.2 PALPITATIONS: ICD-10-CM

## 2018-11-12 PROCEDURE — 93306 TTE W/DOPPLER COMPLETE: CPT

## 2018-11-12 PROCEDURE — 93306 TTE W/DOPPLER COMPLETE: CPT | Performed by: INTERNAL MEDICINE

## 2018-12-27 ENCOUNTER — APPOINTMENT (OUTPATIENT)
Dept: RADIOLOGY | Facility: MEDICAL CENTER | Age: 53
End: 2018-12-27
Payer: COMMERCIAL

## 2018-12-27 ENCOUNTER — OFFICE VISIT (OUTPATIENT)
Dept: URGENT CARE | Facility: MEDICAL CENTER | Age: 53
End: 2018-12-27
Payer: COMMERCIAL

## 2018-12-27 VITALS
HEIGHT: 63 IN | RESPIRATION RATE: 16 BRPM | OXYGEN SATURATION: 100 % | SYSTOLIC BLOOD PRESSURE: 120 MMHG | TEMPERATURE: 98.2 F | BODY MASS INDEX: 23.04 KG/M2 | DIASTOLIC BLOOD PRESSURE: 82 MMHG | WEIGHT: 130 LBS | HEART RATE: 98 BPM

## 2018-12-27 DIAGNOSIS — M25.562 ACUTE PAIN OF LEFT KNEE: ICD-10-CM

## 2018-12-27 DIAGNOSIS — S83.92XA SPRAIN OF LEFT KNEE, UNSPECIFIED LIGAMENT, INITIAL ENCOUNTER: Primary | ICD-10-CM

## 2018-12-27 PROCEDURE — 99214 OFFICE O/P EST MOD 30 MIN: CPT | Performed by: FAMILY MEDICINE

## 2018-12-27 PROCEDURE — 73564 X-RAY EXAM KNEE 4 OR MORE: CPT

## 2018-12-27 NOTE — PROGRESS NOTES
[facility]        NAME: Sarahi Ly is a 46 y o  female  : 1965    MRN: [id number]  DATE: 2018  TIME: 5:47 PM    Assessment and Plan   Sprain of left knee, unspecified ligament, initial encounter [S83  92XA]  1  Sprain of left knee, unspecified ligament, initial encounter     2  Acute pain of left knee  XR knee 4+ vw left injury     History and physical exam consistent with knee sprain  X-ray does not show any acute osseous abnormality  Joint effusion noted on the x-ray  Mild osteoarthritis noted in the patellofemoral joint  Patient was instructed to start physical therapy  Follow-up with Orthopedics    Patient Instructions       Follow up with PCP in 3-5 days  Proceed to  ER if symptoms worsen  Chief Complaint     Chief Complaint   Patient presents with    Knee Pain     left knee pain,  states feels like cement in there  worse today         History of Present Illness       46year old female with acute left sided knee pain  She had a twist and a fall in the bathtub 2 weeks ago and ever since she has had knee pain  Does have swelling, however no mechanical symptoms  No previous knee pain    Does run everyday         Review of Systems   Review of Systems  As above    Current Medications       Current Outpatient Prescriptions:     budesonide-formoterol (SYMBICORT) 80-4 5 MCG/ACT inhaler, Inhale 2 puffs 2 (two) times a day, Disp: , Rfl:     omeprazole (PriLOSEC) 20 mg delayed release capsule, Take 20 mg by mouth daily, Disp: , Rfl:     ALPRAZolam (XANAX) 0 25 mg tablet, Take 1 tablet (0 25 mg total) by mouth daily at bedtime as needed for anxiety, Disp: 30 tablet, Rfl: 0    levalbuterol (XOPENEX HFA) 45 mcg/act inhaler, Inhale 2 puffs every 4 (four) hours as needed, Disp: , Rfl: 0    nitroglycerin (NITROSTAT) 0 6 mg SL tablet, Place 1 tablet (0 6 mg total) under the tongue every 5 (five) minutes as needed for chest pain, Disp: 90 tablet, Rfl: 1    Current Allergies Allergies as of 2018 - Reviewed 2018   Allergen Reaction Noted    Azithromycin Swelling 2015    Bactrim [sulfamethoxazole-trimethoprim] Swelling 2018    Codeine Swelling 2018    Sulfa antibiotics Hives 2018    Sulfasalazine Hives 2018            The following portions of the patient's history were reviewed and updated as appropriate: allergies, current medications, past family history, past medical history, past social history, past surgical history and problem list      Past Medical History:   Diagnosis Date    Asthma     Dysuria 2012    Esophageal spasm 2012    Resolved:  2017    GERD (gastroesophageal reflux disease)     Skin disorder 2013    Resolved:  2017       Past Surgical History:   Procedure Laterality Date     SECTION      WISDOM TOOTH EXTRACTION         Family History   Problem Relation Age of Onset    Diabetes Mother     No Known Problems Father     Thyroid cancer Brother     Depression Daughter     Anxiety disorder Daughter     Alcohol abuse Neg Hx     Mental illness Neg Hx     Substance Abuse Neg Hx          Medications have been verified  Objective   /82   Pulse 98   Temp 98 2 °F (36 8 °C) (Tympanic)   Resp 16   Ht 5' 3" (1 6 m)   Wt 59 kg (130 lb)   LMP  (LMP Unknown)   SpO2 100%   BMI 23 03 kg/m²        Physical Exam     Physical Exam   Constitutional: She is oriented to person, place, and time  She appears well-developed and well-nourished  HENT:   Head: Normocephalic and atraumatic  Eyes: Conjunctivae are normal    Neck: Neck supple  Cardiovascular: Normal rate  Pulmonary/Chest: Effort normal  No respiratory distress  Abdominal: Soft  Musculoskeletal: Normal range of motion  Point tenderness over the medial joint line    Full range of motion, knee extension 0° knee flexion 120 °  Joint effusion noted  No laxity with varus or valgus stress  No laxity with anterior posterior drawer or Lachman's negative  Carlos's negative   Neurological: She is alert and oriented to person, place, and time  Skin: Skin is warm and dry  Psychiatric: She has a normal mood and affect  Her behavior is normal    Nursing note and vitals reviewed

## 2019-01-11 ENCOUNTER — TRANSCRIBE ORDERS (OUTPATIENT)
Dept: OBGYN CLINIC | Facility: MEDICAL CENTER | Age: 54
End: 2019-01-11

## 2019-01-11 ENCOUNTER — OFFICE VISIT (OUTPATIENT)
Dept: OBGYN CLINIC | Facility: MEDICAL CENTER | Age: 54
End: 2019-01-11
Payer: COMMERCIAL

## 2019-01-11 VITALS
HEART RATE: 108 BPM | HEIGHT: 64 IN | BODY MASS INDEX: 23.15 KG/M2 | WEIGHT: 135.6 LBS | SYSTOLIC BLOOD PRESSURE: 119 MMHG | DIASTOLIC BLOOD PRESSURE: 78 MMHG

## 2019-01-11 DIAGNOSIS — S83.92XA SPRAIN OF LEFT KNEE: Primary | ICD-10-CM

## 2019-01-11 DIAGNOSIS — M25.562 LEFT KNEE PAIN, UNSPECIFIED CHRONICITY: Primary | ICD-10-CM

## 2019-01-11 PROCEDURE — 99243 OFF/OP CNSLTJ NEW/EST LOW 30: CPT | Performed by: ORTHOPAEDIC SURGERY

## 2019-01-11 NOTE — PROGRESS NOTES
Assessment/Plan     1  Left knee pain, unspecified chronicity      Orders Placed This Encounter   Procedures    Ambulatory referral to Physical Therapy     · Patient declined today for MRI of the left knee  · Will be Ordering  physical therapy for the left knee  She may go for only one visit to learning and the exercise and continue with them at home    · Activity as tolerated  · Continue with taking OTC IBU prn for pain   · Continue wearing compression knee  Brace for comfort and also to continue icing and elevating as needed  · Patient will call the office if pain gets worse to order MRI Left knee to r/o meniscus tear   Return in about 4 weeks (around 2/8/2019) for Recheck left knee , Recheck  I answered all of the patient's questions during the visit and provided education of the patient's condition during the visit  The patient verbalized understanding of the information given and agrees with the plan  This note was dictated using Veduca software  It may contain errors including improperly dictated words  Please contact physician directly for any questions  History of Present Illness   Chief complaint:   Chief Complaint   Patient presents with    Left Knee - Pain       HPI: Judie Enrique is a 46 y o  female that c/o left knee pain  Patient has a history of bilateral knee sprain from a skiing accident 4 years ago, she had phsyical therapy and whore knee braces  Patient notes recently she slipped and fell in the bath tub directly on the left knee in Nov 2018  She had onset swelling and clicking  She also notes the day christmas  she had increase pain left knee from wearing heels a day on christmas  She is having pain over the anterior medial and clicking on the lateral aspect of knee  Pain is worse with walking  She has been icing, elevating, wearing compression knee brace and taking OTC IBU prn for pain  She notes the swelling has decreased from the injury but still having discomfort   She has not had any injections or surgeries on the knee  ROS:    See HPI for musculoskeletal review  All other systems reviewed are negative     Historical Information   Past Medical History:   Diagnosis Date    Asthma     Dysuria 2012    Esophageal spasm 2012    Resolved:  2017    GERD (gastroesophageal reflux disease)     Skin disorder 2013    Resolved:  2017     Past Surgical History:   Procedure Laterality Date     SECTION      WISDOM TOOTH EXTRACTION       Social History   History   Alcohol Use    Yes     Comment: Socially     History   Drug Use No     History   Smoking Status    Never Smoker   Smokeless Tobacco    Never Used     Family History:   Family History   Problem Relation Age of Onset    Diabetes Mother     No Known Problems Father     Thyroid cancer Brother     Depression Daughter     Anxiety disorder Daughter     Alcohol abuse Neg Hx     Mental illness Neg Hx     Substance Abuse Neg Hx        Meds/Allergies     (Not in a hospital admission)  Allergies   Allergen Reactions    Azithromycin Swelling     Other reaction(s): swelling    Bactrim [Sulfamethoxazole-Trimethoprim] Swelling    Codeine Swelling    Sulfa Antibiotics Hives    Sulfasalazine Hives       Objective   Vitals: Blood pressure 119/78, pulse (!) 108, height 5' 4" (1 626 m), weight 61 5 kg (135 lb 9 6 oz), not currently breastfeeding  ,Body mass index is 23 28 kg/m²      PE:  AAOx 3  WDWN  Hearing intact, no drainage from eyes  Regular rate  no audible wheezing  no abdominal distension  LE compartments soft, skin intact  AT/GS intact, sensation grossly intact L4, L5, S1, palpable pedal pulse    leftknee:    Appearance:  no swelling   No bruising  no obvious joint deformity   No effusion  Palpation/Tenderness:   No TTP over medial joint line, no TTP over lateral joint line or over patella/patellar tendon/ Mild TTP over pes anserine   Active Range of Motion:  AROM: full/ clicking with ROM   Special Tests:  Medial Carlos's Test:  Positive  Lateral Carlos's Test:  Negative  Apley's compression test:  Negative  Lachman's Test:  negative  Anterior Drawer Test:  negative  Valgus Stress Test:  negative  Varus Stress Test:  negative   Patella Grind Test: Negative   No ipsilateral hip pain with ROM    Imaging Studies: I have personally reviewed pertinent films in PACS  XR Left knee findings: Mild arthritis     Scribe Attestation    I,:   Waldo Simpson am acting as a scribe while in the presence of the attending physician :        I,:   Traci Kessler DO personally performed the services described in this documentation    as scribed in my presence :

## 2019-03-16 DIAGNOSIS — F41.9 ANXIETY: ICD-10-CM

## 2019-03-19 RX ORDER — ALPRAZOLAM 0.25 MG/1
0.25 TABLET ORAL
Qty: 30 TABLET | Refills: 0 | Status: SHIPPED | OUTPATIENT
Start: 2019-03-19 | End: 2019-07-25 | Stop reason: SDUPTHER

## 2019-03-20 ENCOUNTER — TELEPHONE (OUTPATIENT)
Dept: OBGYN CLINIC | Facility: HOSPITAL | Age: 54
End: 2019-03-20

## 2019-03-21 DIAGNOSIS — M25.562 LEFT KNEE PAIN, UNSPECIFIED CHRONICITY: Primary | ICD-10-CM

## 2019-03-21 DIAGNOSIS — M17.12 PRIMARY OSTEOARTHRITIS OF LEFT KNEE: ICD-10-CM

## 2019-04-10 ENCOUNTER — OFFICE VISIT (OUTPATIENT)
Dept: FAMILY MEDICINE CLINIC | Facility: CLINIC | Age: 54
End: 2019-04-10
Payer: COMMERCIAL

## 2019-04-10 VITALS
DIASTOLIC BLOOD PRESSURE: 88 MMHG | HEIGHT: 64 IN | HEART RATE: 101 BPM | WEIGHT: 136.4 LBS | RESPIRATION RATE: 16 BRPM | SYSTOLIC BLOOD PRESSURE: 128 MMHG | BODY MASS INDEX: 23.29 KG/M2 | TEMPERATURE: 98.5 F | OXYGEN SATURATION: 98 %

## 2019-04-10 DIAGNOSIS — J01.10 ACUTE NON-RECURRENT FRONTAL SINUSITIS: Primary | ICD-10-CM

## 2019-04-10 PROCEDURE — 3008F BODY MASS INDEX DOCD: CPT | Performed by: NURSE PRACTITIONER

## 2019-04-10 PROCEDURE — 99213 OFFICE O/P EST LOW 20 MIN: CPT | Performed by: NURSE PRACTITIONER

## 2019-04-10 RX ORDER — AMOXICILLIN 125 MG/1
500 TABLET, CHEWABLE ORAL 3 TIMES DAILY
Qty: 120 TABLET | Refills: 0 | Status: SHIPPED | OUTPATIENT
Start: 2019-04-10 | End: 2019-04-10 | Stop reason: ALTCHOICE

## 2019-04-10 RX ORDER — PREDNISONE 10 MG/1
TABLET ORAL
Qty: 15 TABLET | Refills: 0 | Status: SHIPPED | OUTPATIENT
Start: 2019-04-10 | End: 2019-06-26

## 2019-04-12 ENCOUNTER — OFFICE VISIT (OUTPATIENT)
Dept: OBGYN CLINIC | Facility: MEDICAL CENTER | Age: 54
End: 2019-04-12
Payer: COMMERCIAL

## 2019-04-12 VITALS
HEIGHT: 64 IN | WEIGHT: 136 LBS | DIASTOLIC BLOOD PRESSURE: 71 MMHG | HEART RATE: 96 BPM | BODY MASS INDEX: 23.22 KG/M2 | SYSTOLIC BLOOD PRESSURE: 110 MMHG

## 2019-04-12 DIAGNOSIS — M17.12 PRIMARY OSTEOARTHRITIS OF LEFT KNEE: Primary | ICD-10-CM

## 2019-04-12 PROCEDURE — 99213 OFFICE O/P EST LOW 20 MIN: CPT | Performed by: ORTHOPAEDIC SURGERY

## 2019-06-26 ENCOUNTER — OFFICE VISIT (OUTPATIENT)
Dept: FAMILY MEDICINE CLINIC | Facility: CLINIC | Age: 54
End: 2019-06-26
Payer: COMMERCIAL

## 2019-06-26 VITALS
SYSTOLIC BLOOD PRESSURE: 140 MMHG | DIASTOLIC BLOOD PRESSURE: 80 MMHG | TEMPERATURE: 99.7 F | OXYGEN SATURATION: 99 % | HEART RATE: 101 BPM | BODY MASS INDEX: 22.94 KG/M2 | WEIGHT: 134.4 LBS | HEIGHT: 64 IN

## 2019-06-26 DIAGNOSIS — M54.31 SCIATICA OF RIGHT SIDE: Primary | ICD-10-CM

## 2019-06-26 DIAGNOSIS — M54.16 LUMBAR BACK PAIN WITH RADICULOPATHY AFFECTING RIGHT LOWER EXTREMITY: ICD-10-CM

## 2019-06-26 PROCEDURE — 99213 OFFICE O/P EST LOW 20 MIN: CPT | Performed by: FAMILY MEDICINE

## 2019-06-26 PROCEDURE — 1036F TOBACCO NON-USER: CPT | Performed by: FAMILY MEDICINE

## 2019-06-26 PROCEDURE — 3008F BODY MASS INDEX DOCD: CPT | Performed by: FAMILY MEDICINE

## 2019-06-26 RX ORDER — PREDNISONE 10 MG/1
TABLET ORAL
Qty: 21 TABLET | Refills: 0 | Status: SHIPPED | OUTPATIENT
Start: 2019-06-26 | End: 2019-07-29 | Stop reason: SDUPTHER

## 2019-06-28 ENCOUNTER — APPOINTMENT (OUTPATIENT)
Dept: RADIOLOGY | Facility: CLINIC | Age: 54
End: 2019-06-28
Payer: COMMERCIAL

## 2019-06-28 DIAGNOSIS — M54.31 SCIATICA OF RIGHT SIDE: ICD-10-CM

## 2019-06-28 PROCEDURE — 72110 X-RAY EXAM L-2 SPINE 4/>VWS: CPT

## 2019-07-08 ENCOUNTER — DOCUMENTATION (OUTPATIENT)
Dept: FAMILY MEDICINE CLINIC | Facility: CLINIC | Age: 54
End: 2019-07-08

## 2019-07-08 DIAGNOSIS — M54.16 LUMBAR RADICULOPATHY: Primary | ICD-10-CM

## 2019-07-11 ENCOUNTER — HOSPITAL ENCOUNTER (OUTPATIENT)
Dept: MRI IMAGING | Facility: HOSPITAL | Age: 54
Discharge: HOME | End: 2019-07-11
Payer: COMMERCIAL

## 2019-07-11 DIAGNOSIS — M54.16 LUMBAR RADICULOPATHY: ICD-10-CM

## 2019-07-11 PROCEDURE — 72148 MRI LUMBAR SPINE W/O DYE: CPT

## 2019-07-17 DIAGNOSIS — N83.209 CYST OF OVARY, UNSPECIFIED LATERALITY: ICD-10-CM

## 2019-07-17 DIAGNOSIS — M54.40 LOW BACK PAIN WITH SCIATICA, SCIATICA LATERALITY UNSPECIFIED, UNSPECIFIED BACK PAIN LATERALITY, UNSPECIFIED CHRONICITY: ICD-10-CM

## 2019-07-17 DIAGNOSIS — M54.16 LUMBAR RADICULOPATHY: Primary | ICD-10-CM

## 2019-07-25 DIAGNOSIS — F41.9 ANXIETY: ICD-10-CM

## 2019-07-26 ENCOUNTER — HOSPITAL ENCOUNTER (OUTPATIENT)
Dept: ULTRASOUND IMAGING | Facility: MEDICAL CENTER | Age: 54
Discharge: HOME | End: 2019-07-26
Payer: COMMERCIAL

## 2019-07-26 DIAGNOSIS — N83.209 CYST OF OVARY, UNSPECIFIED LATERALITY: ICD-10-CM

## 2019-07-26 PROCEDURE — 76830 TRANSVAGINAL US NON-OB: CPT

## 2019-07-26 PROCEDURE — 76856 US EXAM PELVIC COMPLETE: CPT

## 2019-07-26 RX ORDER — ALPRAZOLAM 0.25 MG/1
0.25 TABLET ORAL
Qty: 30 TABLET | Refills: 0 | Status: SHIPPED | OUTPATIENT
Start: 2019-07-26 | End: 2020-07-09 | Stop reason: SDUPTHER

## 2019-07-29 ENCOUNTER — TELEPHONE (OUTPATIENT)
Dept: FAMILY MEDICINE CLINIC | Facility: CLINIC | Age: 54
End: 2019-07-29

## 2019-07-29 DIAGNOSIS — M54.31 SCIATICA OF RIGHT SIDE: ICD-10-CM

## 2019-07-29 RX ORDER — PREDNISONE 10 MG/1
TABLET ORAL
Qty: 21 TABLET | Refills: 0 | Status: SHIPPED | OUTPATIENT
Start: 2019-07-29 | End: 2019-12-19 | Stop reason: ALTCHOICE

## 2019-07-29 NOTE — TELEPHONE ENCOUNTER
Pt called and left voicemail on inbox stating she is experiencing numbness on her L foot, calf and some on R side of body  Pt states she believes this is because of herniated disc  Stated she doesn't believe she needs to go to ER  Wanted to verify if pain is related to back issues  Stated she has an appt with pain management 08/13/19  Please advise

## 2019-07-29 NOTE — TELEPHONE ENCOUNTER
You do not need to have her schedule here  I also sent her message through the my chart  If she has a PT eval today I would let her go forward with that  I also called in steroids for her  I informed her of that through my chart also

## 2019-07-29 NOTE — TELEPHONE ENCOUNTER
Should I schedule f/u here or just make her aware that the two can correlate  She has appt today for PT evaluation and again a appt with pain management 8/13

## 2019-07-29 NOTE — TELEPHONE ENCOUNTER
Based on MRI findings  It is very possible that she could have right-sided leg, foot or calf pain or numbness

## 2019-07-30 ENCOUNTER — EVALUATION (OUTPATIENT)
Dept: PHYSICAL THERAPY | Facility: MEDICAL CENTER | Age: 54
End: 2019-07-30
Payer: COMMERCIAL

## 2019-07-30 DIAGNOSIS — M54.16 LUMBAR RADICULOPATHY: Primary | ICD-10-CM

## 2019-07-30 DIAGNOSIS — M54.40 LOW BACK PAIN WITH SCIATICA, SCIATICA LATERALITY UNSPECIFIED, UNSPECIFIED BACK PAIN LATERALITY, UNSPECIFIED CHRONICITY: ICD-10-CM

## 2019-07-30 PROCEDURE — 97112 NEUROMUSCULAR REEDUCATION: CPT | Performed by: PHYSICAL MEDICINE & REHABILITATION

## 2019-07-30 PROCEDURE — 97162 PT EVAL MOD COMPLEX 30 MIN: CPT | Performed by: PHYSICAL MEDICINE & REHABILITATION

## 2019-07-30 NOTE — PROGRESS NOTES
PT Evaluation     Today's date: 2019  Patient name: Oletta Kehr  : 1965  MRN: [id number]  Referring provider: Mary Leon DO  Dx:   Encounter Diagnosis     ICD-10-CM    1  Lumbar radiculopathy M54 16 Ambulatory referral to Physical Therapy   2  Low back pain with sciatica, sciatica laterality unspecified, unspecified back pain laterality, unspecified chronicity M54 40 Ambulatory referral to Physical Therapy                  Assessment  Assessment details: Oletta Kehr is a pleasant 53 y o  female who presents with chronic lumbar pain with sciatica  The patient's greatest concerns are worry over not knowing what's wrong and fear of not being able to keep active  No further referral appears necessary at this time based upon examination results  Primary movement impairment diagnosis of poor lumbopelvic movement coordination resulting in pathoanatomical symptoms of Lumbar radiculopathy  (primary encounter diagnosis)  Low back pain with sciatica, sciatica laterality unspecified, unspecified back pain laterality, unspecified chronicity and limiting her ability to carry, drive, exercise or recreation, perform household chores, perform yard work, sit, squat to  objects from the floor and stand  Impairments include:  1) poor lumbopelvic movement coordination - addressing with neuromotor retraining   2) central sensitization - addressing with extensive counseling regarding pain neuroscience, diagnosis, prognosis, care options, and care planning  3) hip hypomobility - addressing with mobs and mobility exercises   4) LE neural tension - addressing with mobs and mobility exercises   5) poor lifting mechanics - addressing with functional retraining  6) transfer intolerance - addressing with functional retraining     Etiologic factors include poor ergonomics      Impairments: abnormal coordination, abnormal muscle firing, abnormal muscle tone, abnormal or restricted ROM, abnormal movement, activity intolerance, difficulty understanding, impaired physical strength, lacks appropriate home exercise program and pain with function    Symptom irritability: moderateUnderstanding of Dx/Px/POC: fair   Prognosis: good  Prognosis details: Positive prognostic indicators include positive attitude toward recovery  Negative prognostic indicators include chronicity of symptoms and lack of centralization with movement  Goals  Patient will be independent with home exercise program    Patient will be able to manage symptoms independently  Patient will be able to roll in bed without limitation due to pain  Patient will be able to get in/out of her car without limitation due to pain  Patient will be able to get in/out of bed without limitation due to pain  Patient will be able to squat to  objects from the floor without limitation due to pain  Patient will be able to lift without limitation due to pain  Plan  Plan details: Prognosis above is given PT services 2x/week tapering to 2x/month over the next 3 months and home program adherence    Patient would benefit from: skilled physical therapy  Planned modality interventions: thermotherapy: hydrocollator packs  Planned therapy interventions: abdominal trunk stabilization, activity modification, joint mobilization, manual therapy, motor coordination training, neuromuscular re-education, patient education, self care, therapeutic activities, therapeutic exercise, graded activity, home exercise program and behavior modification  Treatment plan discussed with: patient        Subjective Evaluation    History of Present Illness  Mechanism of injury: Work/School: , does sit at a desk, 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, pain while standing,    Home Life: cooking, cleaning, yard work, gardening, does have stairs at home stairs are not an issue at this time,   Hobbies/exercise: power walking,   Pain location: R thigh and shin, R anterior hip, R lower back,   HPI: tingling sensations down lateral side of R leg, three year history,   Aggravating factors: bending forward, sitting,   Relieving factors: walking is better than standing, strolling,   PMH:   Pain  Current pain rating: 3  At best pain ratin  At worst pain ratin    Patient Goals  Patient goal: be able to work with less pain, decrease numbness, make driving easier,         Objective     Static Posture     Comments  Lumbar ROM:   Ext: 50% limited  Flex: 75% limited, patient demonstrates function ROM of toe touching, however, demonstrates hip hinging without lumbar flexion    Reflexes: WNL  Dermatomes:  WNL    Repeated motions:  Flexion: peripheralization  Extension: centralization into calf    Patient demonstrates decreased TrA feedforward mechanism    MMTs:  Gluteus luis angel and hamstrings 3+/5 bilaterally  Hip flexion: 4/5 bilaterally  Hip abductors: 3+/5 bilaterally      Flowsheet Rows      Most Recent Value   PT/OT G-Codes   Current Score  59   Projected Score  70             Precautions: none, extension bias      Manual  2019                                                                                 Exercise Diary              REIP             TrA                                                                                                                                                                                                                                          HEP teaching and return demonstration JR                Modalities

## 2019-08-01 ENCOUNTER — TELEPHONE (OUTPATIENT)
Dept: FAMILY MEDICINE CLINIC | Facility: CLINIC | Age: 54
End: 2019-08-01

## 2019-08-01 NOTE — TELEPHONE ENCOUNTER
Ignacia Saba from [facility] called stating pt had significant finding for US pelvis  Went to park on phone stated that it was non urgent and that it was just stating to have pt have a yearly f/u results in chart and message was sent out  [facility] [phone]

## 2019-08-01 NOTE — TELEPHONE ENCOUNTER
Please let patient know that ultrasound did show a cyst on the ovary  It appears to be a simple cyst and has no concerning features  Likely requires no treatment whatsoever    I would still recommend she follow up with her gynecologist

## 2019-08-06 ENCOUNTER — OFFICE VISIT (OUTPATIENT)
Dept: PHYSICAL THERAPY | Facility: MEDICAL CENTER | Age: 54
End: 2019-08-06
Payer: COMMERCIAL

## 2019-08-06 ENCOUNTER — APPOINTMENT (OUTPATIENT)
Dept: PHYSICAL THERAPY | Facility: MEDICAL CENTER | Age: 54
End: 2019-08-06
Payer: COMMERCIAL

## 2019-08-06 DIAGNOSIS — M54.16 LUMBAR RADICULOPATHY: Primary | ICD-10-CM

## 2019-08-06 DIAGNOSIS — M54.40 LOW BACK PAIN WITH SCIATICA, SCIATICA LATERALITY UNSPECIFIED, UNSPECIFIED BACK PAIN LATERALITY, UNSPECIFIED CHRONICITY: ICD-10-CM

## 2019-08-06 PROCEDURE — 97140 MANUAL THERAPY 1/> REGIONS: CPT | Performed by: PHYSICAL MEDICINE & REHABILITATION

## 2019-08-06 NOTE — PROGRESS NOTES
Daily Note     Today's date: 2019  Patient name: Lauren Paz  : 1965  MRN: [id number]  Referring provider: Wallace Goel DO  Dx:   Encounter Diagnosis     ICD-10-CM    1  Lumbar radiculopathy M54 16    2  Low back pain with sciatica, sciatica laterality unspecified, unspecified back pain laterality, unspecified chronicity M54 40                   Subjective: Marlene Hinton reported "I have noticed the knees to my chest seems to make things feel better "      Objective: See treatment diary below      Assessment: Tolerated treatment well  Patient would benefit from continued PT  Marlene Hinton demonstrated centralization with flexion based exercises during today's session  Her subjective report of pain while sitting and bending forward continues to suggest extension based exercises  However, due to her centralization her HEP has been shifted to flexion based  She was instructed to put her extension based exercises on hold  Plan: Continue per plan of care        Precautions: none, extension bias      Manual  2019                                                                                Exercise Diary              REIP             TrA  3x10           TrA leg fall outs  3x10 3"           DKTC  3x10                                                                                                                                                                                                              HEP teaching and return demonstration JR                Modalities

## 2019-08-08 ENCOUNTER — APPOINTMENT (OUTPATIENT)
Dept: PHYSICAL THERAPY | Facility: MEDICAL CENTER | Age: 54
End: 2019-08-08
Payer: COMMERCIAL

## 2019-08-09 ENCOUNTER — OFFICE VISIT (OUTPATIENT)
Dept: PHYSICAL THERAPY | Facility: MEDICAL CENTER | Age: 54
End: 2019-08-09
Payer: COMMERCIAL

## 2019-08-09 DIAGNOSIS — M54.40 LOW BACK PAIN WITH SCIATICA, SCIATICA LATERALITY UNSPECIFIED, UNSPECIFIED BACK PAIN LATERALITY, UNSPECIFIED CHRONICITY: ICD-10-CM

## 2019-08-09 DIAGNOSIS — M54.16 LUMBAR RADICULOPATHY: Primary | ICD-10-CM

## 2019-08-09 PROCEDURE — 97140 MANUAL THERAPY 1/> REGIONS: CPT | Performed by: PHYSICAL MEDICINE & REHABILITATION

## 2019-08-13 ENCOUNTER — APPOINTMENT (OUTPATIENT)
Dept: PHYSICAL THERAPY | Facility: MEDICAL CENTER | Age: 54
End: 2019-08-13
Payer: COMMERCIAL

## 2019-08-15 ENCOUNTER — APPOINTMENT (OUTPATIENT)
Dept: PHYSICAL THERAPY | Facility: MEDICAL CENTER | Age: 54
End: 2019-08-15
Payer: COMMERCIAL

## 2019-08-20 ENCOUNTER — APPOINTMENT (OUTPATIENT)
Dept: PHYSICAL THERAPY | Facility: MEDICAL CENTER | Age: 54
End: 2019-08-20
Payer: COMMERCIAL

## 2019-08-20 ENCOUNTER — OFFICE VISIT (OUTPATIENT)
Dept: PHYSICAL THERAPY | Facility: MEDICAL CENTER | Age: 54
End: 2019-08-20
Payer: COMMERCIAL

## 2019-08-20 DIAGNOSIS — M54.40 LOW BACK PAIN WITH SCIATICA, SCIATICA LATERALITY UNSPECIFIED, UNSPECIFIED BACK PAIN LATERALITY, UNSPECIFIED CHRONICITY: ICD-10-CM

## 2019-08-20 DIAGNOSIS — M54.16 LUMBAR RADICULOPATHY: Primary | ICD-10-CM

## 2019-08-20 PROCEDURE — 97140 MANUAL THERAPY 1/> REGIONS: CPT | Performed by: PHYSICAL MEDICINE & REHABILITATION

## 2019-08-20 NOTE — PROGRESS NOTES
Daily Note     Today's date: 2019  Patient name: Isabel Clements  : 1965  MRN: [id number]  Referring provider: Tripp Richards DO  Dx:   Encounter Diagnosis     ICD-10-CM    1  Lumbar radiculopathy M54 16    2  Low back pain with sciatica, sciatica laterality unspecified, unspecified back pain laterality, unspecified chronicity M54 40                   Subjective: Go Bustamante reported "I am feeling pretty good today, I am still having trouble with painting "    Objective: See treatment diary below      Assessment: Tolerated treatment well  Patient would benefit from continued PT  Go Bustamante was able to add physio ball push downs and palloff presses to her therapy program which shows progress towards her goals  Plan: Continue per plan of care        Precautions: none, extension bias      Manual  2019         IAS    JR                                                                 Exercise Diary              REIP             TrA  3x10 3x10 3x10         TrA leg fall outs  3x10 3" 3x10 3" 3x10 3"         DKTC  3x10 3x10          TrA leg pick ups   3x10x2 3x10x2         Seated flexion   D/Tien          Seated Physio ball push down    Or 3x10 5"                                                                                                                                                                     HEP teaching and return demonstration JR                Modalities

## 2019-08-23 ENCOUNTER — APPOINTMENT (OUTPATIENT)
Dept: PHYSICAL THERAPY | Facility: MEDICAL CENTER | Age: 54
End: 2019-08-23
Payer: COMMERCIAL

## 2019-08-27 ENCOUNTER — APPOINTMENT (OUTPATIENT)
Dept: PHYSICAL THERAPY | Facility: MEDICAL CENTER | Age: 54
End: 2019-08-27
Payer: COMMERCIAL

## 2019-10-17 DIAGNOSIS — K58.1 IRRITABLE BOWEL SYNDROME WITH CONSTIPATION: Primary | ICD-10-CM

## 2019-10-17 RX ORDER — HYOSCYAMINE SULFATE 0.125 MG
0.12 TABLET ORAL EVERY 4 HOURS PRN
Qty: 30 TABLET | Refills: 0 | Status: SHIPPED | OUTPATIENT
Start: 2019-10-17

## 2019-10-29 NOTE — PROGRESS NOTES
PT Discharge    Today's date: 10/29/2019  Patient name: Feng Millard  : 1965  MRN: [id number]  Referring provider: Tony Anand DO  Dx:   Encounter Diagnosis     ICD-10-CM    1  Lumbar radiculopathy M54 16    2  Low back pain with sciatica, sciatica laterality unspecified, unspecified back pain laterality, unspecified chronicity M54 40      Patient denies therapy at this time       Start Time: 1801  Stop Time: 1830  Total time in clinic (min): 29 minutes    Assessment/Plan    Subjective    Objective

## 2019-12-19 ENCOUNTER — OFFICE VISIT (OUTPATIENT)
Dept: FAMILY MEDICINE CLINIC | Facility: CLINIC | Age: 54
End: 2019-12-19
Payer: COMMERCIAL

## 2019-12-19 VITALS
DIASTOLIC BLOOD PRESSURE: 78 MMHG | WEIGHT: 131 LBS | SYSTOLIC BLOOD PRESSURE: 132 MMHG | HEART RATE: 112 BPM | BODY MASS INDEX: 23.21 KG/M2 | TEMPERATURE: 98 F | OXYGEN SATURATION: 99 % | HEIGHT: 63 IN

## 2019-12-19 DIAGNOSIS — Z11.59 ENCOUNTER FOR HEPATITIS C SCREENING TEST FOR LOW RISK PATIENT: ICD-10-CM

## 2019-12-19 DIAGNOSIS — R29.890 HEIGHT LOSS: ICD-10-CM

## 2019-12-19 DIAGNOSIS — M54.2 NECK PAIN: ICD-10-CM

## 2019-12-19 DIAGNOSIS — R73.9 ELEVATED BLOOD SUGAR: ICD-10-CM

## 2019-12-19 DIAGNOSIS — Z11.4 SCREENING FOR HIV (HUMAN IMMUNODEFICIENCY VIRUS): ICD-10-CM

## 2019-12-19 DIAGNOSIS — M85.859 OSTEOPENIA OF HIP, UNSPECIFIED LATERALITY: ICD-10-CM

## 2019-12-19 DIAGNOSIS — E78.2 MIXED HYPERLIPIDEMIA: ICD-10-CM

## 2019-12-19 DIAGNOSIS — Z00.00 WELL ADULT EXAM: Primary | ICD-10-CM

## 2019-12-19 DIAGNOSIS — M25.551 RIGHT HIP PAIN: ICD-10-CM

## 2019-12-19 PROCEDURE — 99396 PREV VISIT EST AGE 40-64: CPT | Performed by: FAMILY MEDICINE

## 2019-12-20 NOTE — PROGRESS NOTES
Assessment/Plan:    Patient is 53-year-old female seen for a well adult exam     She does have complaints of hip pain  Also neck pain  She also has some other joint pains  He has a history osteopenia and we will check a DEXA scan and vitamin-D level  Did refer her to Orthopedics regarding hip pain  Also ordered x-ray of her cervical spine  She will have fasting blood work to check her cholesterol  It has been elevated in the past   She will have screening for Hep C and HIV  Diagnoses and all orders for this visit:    Well adult exam    Right hip pain  -     Ambulatory referral to Orthopedic Surgery; Future    Mixed hyperlipidemia  -     Lipid Panel with Direct LDL reflex; Future  -     Comprehensive metabolic panel; Future  -     TSH, 3rd generation with Free T4 reflex; Future  -     CBC and differential; Future  -     Lipid Panel with Direct LDL reflex  -     Comprehensive metabolic panel  -     TSH, 3rd generation with Free T4 reflex  -     CBC and differential    Elevated blood sugar  -     HEMOGLOBIN A1C W/ EAG ESTIMATION; Future  -     HEMOGLOBIN A1C W/ EAG ESTIMATION    Neck pain  -     XR spine cervical complete 4 or 5 vw non injury; Future    Encounter for hepatitis C screening test for low risk patient  -     Hepatitis C antibody; Future  -     Hepatitis C antibody    Screening for HIV (human immunodeficiency virus)  -     HIV 1/2 w/ Reflex (Multispot); Future  -     HIV 1/2 w/ Reflex (Multispot)    Osteopenia of hip, unspecified laterality  -     DXA bone density spine hip and pelvis; Future  -     Vitamin D 25 hydroxy; Future  -     Vitamin D 25 hydroxy    Height loss  -     DXA bone density spine hip and pelvis; Future    Other orders  -     lidocaine (XYLOCAINE) 2 % topical gel;  Apply 1 application topically  -     RNA Qualitative          Subjective:   Chief Complaint   Patient presents with    Physical Exam    Multiple Concerns     Concerns about recent mammogram  C/o right hip pain, numbness in right foot started around May  Patient ID: Jeniffer Guerrero is a 53 y o  female  Patient is here for a well exam   Patient with history of lumbar disc disease  Patient with right hip pain at all times  Also with neck pain on the right at base of skull  Sometimes radiates to right eye  Seems t stem from trapezius muscle  Also with "cracking" noise and decreased range of motion  Patient with IBS - pain and constipation  Questions about mammogram       The following portions of the patient's history were reviewed and updated as appropriate: allergies, current medications, past family history, past medical history, past social history, past surgical history and problem list     Review of Systems   Constitutional: Negative for chills and fever  HENT: Negative for congestion and sore throat  Respiratory: Negative for chest tightness  Cardiovascular: Negative for chest pain and palpitations  Gastrointestinal: Negative for abdominal pain, constipation, diarrhea and nausea  IBS had acted up - but has subsided  Genitourinary: Negative for difficulty urinating  Musculoskeletal: Positive for arthralgias  Skin: Negative  Neurological: Negative for dizziness  Headaches: Has had colonoscopy in the past    Psychiatric/Behavioral: Negative  Objective:      /78 (BP Location: Right arm, Patient Position: Sitting, Cuff Size: Adult)   Pulse (!) 112   Temp 98 °F (36 7 °C) (Tympanic)   Ht 5' 3 25" (1 607 m)   Wt 59 4 kg (131 lb)   LMP  (LMP Unknown)   SpO2 99%   BMI 23 02 kg/m²          Physical Exam   Constitutional: She is oriented to person, place, and time  She appears well-developed and well-nourished  No distress  HENT:   Head: Normocephalic     Right Ear: Tympanic membrane normal    Left Ear: Tympanic membrane normal    Nose: Nose normal    Mouth/Throat: Oropharynx is clear and moist and mucous membranes are normal    Eyes: Pupils are equal, round, and reactive to light  Neck: Normal range of motion  Carotid bruit is not present  No thyromegaly present  Cardiovascular: Normal rate, regular rhythm, normal heart sounds and intact distal pulses  Pulmonary/Chest: Effort normal and breath sounds normal    Abdominal: Soft  Bowel sounds are normal  There is no tenderness  Musculoskeletal: Normal range of motion  She exhibits no edema  Lymphadenopathy:     She has no cervical adenopathy  Neurological: She is alert and oriented to person, place, and time  She has normal reflexes  Skin: Skin is warm and dry  Psychiatric: She has a normal mood and affect  Nursing note and vitals reviewed

## 2019-12-23 ENCOUNTER — TRANSCRIBE ORDERS (OUTPATIENT)
Dept: FAMILY MEDICINE CLINIC | Facility: CLINIC | Age: 54
End: 2019-12-23

## 2019-12-23 ENCOUNTER — HOSPITAL ENCOUNTER (OUTPATIENT)
Dept: BONE DENSITY | Facility: MEDICAL CENTER | Age: 54
Discharge: HOME | End: 2019-12-23
Payer: COMMERCIAL

## 2019-12-23 ENCOUNTER — APPOINTMENT (OUTPATIENT)
Dept: RADIOLOGY | Facility: MEDICAL CENTER | Age: 54
End: 2019-12-23
Payer: COMMERCIAL

## 2019-12-23 DIAGNOSIS — R29.890 HEIGHT LOSS: ICD-10-CM

## 2019-12-23 DIAGNOSIS — M85.859 OSTEOPENIA OF HIP, UNSPECIFIED LATERALITY: ICD-10-CM

## 2019-12-23 DIAGNOSIS — M54.2 NECK PAIN: ICD-10-CM

## 2019-12-23 LAB
HBA1C MFR BLD HPLC: 5.8 %
HCV AB SER-ACNC: <0.1

## 2019-12-23 PROCEDURE — 77080 DXA BONE DENSITY AXIAL: CPT

## 2019-12-23 PROCEDURE — 72050 X-RAY EXAM NECK SPINE 4/5VWS: CPT

## 2019-12-24 LAB — 25(OH)D3+25(OH)D2 SERPL-MCNC: 15 NG/ML (ref 30–100)

## 2019-12-28 LAB
ALBUMIN SERPL-MCNC: 4.5 G/DL (ref 3.5–5.5)
ALBUMIN/GLOB SERPL: 2.5 {RATIO} (ref 1.2–2.2)
ALP SERPL-CCNC: 56 IU/L (ref 39–117)
ALT SERPL-CCNC: 13 IU/L (ref 0–32)
AST SERPL-CCNC: 17 IU/L (ref 0–40)
BASOPHILS # BLD AUTO: 0 X10E3/UL (ref 0–0.2)
BASOPHILS NFR BLD AUTO: 1 %
BILIRUB SERPL-MCNC: 0.4 MG/DL (ref 0–1.2)
BUN SERPL-MCNC: 12 MG/DL (ref 6–24)
BUN/CREAT SERPL: 13 (ref 9–23)
CALCIUM SERPL-MCNC: 9.2 MG/DL (ref 8.7–10.2)
CHLORIDE SERPL-SCNC: 100 MMOL/L (ref 96–106)
CHOLEST SERPL-MCNC: 247 MG/DL (ref 100–199)
CO2 SERPL-SCNC: 23 MMOL/L (ref 20–29)
CREAT SERPL-MCNC: 0.93 MG/DL (ref 0.57–1)
EOSINOPHIL # BLD AUTO: 0.6 X10E3/UL (ref 0–0.4)
EOSINOPHIL NFR BLD AUTO: 13 %
ERYTHROCYTE [DISTWIDTH] IN BLOOD BY AUTOMATED COUNT: 13 % (ref 12.3–15.4)
EST. AVERAGE GLUCOSE BLD GHB EST-MCNC: 120 MG/DL
GLOBULIN SER-MCNC: 1.8 G/DL (ref 1.5–4.5)
GLUCOSE SERPL-MCNC: 104 MG/DL (ref 65–99)
HBA1C MFR BLD: 5.8 % (ref 4.8–5.6)
HCT VFR BLD AUTO: 36.6 % (ref 34–46.6)
HCV AB S/CO SERPL IA: <0.1 S/CO RATIO (ref 0–0.9)
HDLC SERPL-MCNC: 74 MG/DL
HGB BLD-MCNC: 13 G/DL (ref 11.1–15.9)
HGB FRACT BLD-IMP: NEGATIVE
HIV1 AB SERPL QL IA: NEGATIVE
IMM GRANULOCYTES # BLD: 0 X10E3/UL (ref 0–0.1)
IMM GRANULOCYTES NFR BLD: 0 %
LDLC SERPL CALC-MCNC: 154 MG/DL (ref 0–99)
LDLC/HDLC SERPL: 2.1 RATIO (ref 0–3.2)
LYMPHOCYTES # BLD AUTO: 1.4 X10E3/UL (ref 0.7–3.1)
LYMPHOCYTES NFR BLD AUTO: 29 %
MCH RBC QN AUTO: 31 PG (ref 26.6–33)
MCHC RBC AUTO-ENTMCNC: 35.5 G/DL (ref 31.5–35.7)
MCV RBC AUTO: 87 FL (ref 79–97)
MONOCYTES # BLD AUTO: 0.3 X10E3/UL (ref 0.1–0.9)
MONOCYTES NFR BLD AUTO: 6 %
NEUTROPHILS # BLD AUTO: 2.4 X10E3/UL (ref 1.4–7)
NEUTROPHILS NFR BLD AUTO: 51 %
PLATELET # BLD AUTO: 234 X10E3/UL (ref 150–450)
POTASSIUM SERPL-SCNC: 3.9 MMOL/L (ref 3.5–5.2)
PROT SERPL-MCNC: 6.3 G/DL (ref 6–8.5)
RBC # BLD AUTO: 4.2 X10E6/UL (ref 3.77–5.28)
SL AMB EGFR AFRICAN AMERICAN: 80 ML/MIN/1.73
SL AMB EGFR NON AFRICAN AMERICAN: 69 ML/MIN/1.73
SL AMB FINAL INTERPRETATION: NORMAL
SL AMB HIV 1 RNA QUALITATIVE: NEGATIVE
SL AMB HIV 2 AB: NEGATIVE
SL AMB VLDL CHOLESTEROL CALC: 19 MG/DL (ref 5–40)
SODIUM SERPL-SCNC: 137 MMOL/L (ref 134–144)
TRIGL SERPL-MCNC: 93 MG/DL (ref 0–149)
TSH SERPL DL<=0.005 MIU/L-ACNC: 1.66 UIU/ML (ref 0.45–4.5)
WBC # BLD AUTO: 4.8 X10E3/UL (ref 3.4–10.8)

## 2019-12-28 PROCEDURE — 3044F HG A1C LEVEL LT 7.0%: CPT | Performed by: FAMILY MEDICINE

## 2020-01-08 ENCOUNTER — OFFICE VISIT (OUTPATIENT)
Dept: FAMILY MEDICINE CLINIC | Facility: CLINIC | Age: 55
End: 2020-01-08
Payer: COMMERCIAL

## 2020-01-08 VITALS
WEIGHT: 130 LBS | TEMPERATURE: 98.4 F | DIASTOLIC BLOOD PRESSURE: 80 MMHG | HEIGHT: 64 IN | HEART RATE: 94 BPM | BODY MASS INDEX: 22.2 KG/M2 | RESPIRATION RATE: 16 BRPM | OXYGEN SATURATION: 98 % | SYSTOLIC BLOOD PRESSURE: 126 MMHG

## 2020-01-08 DIAGNOSIS — J98.8 RESPIRATORY INFECTION: Primary | ICD-10-CM

## 2020-01-08 DIAGNOSIS — E55.9 VITAMIN D DEFICIENCY: ICD-10-CM

## 2020-01-08 PROCEDURE — 99213 OFFICE O/P EST LOW 20 MIN: CPT | Performed by: FAMILY MEDICINE

## 2020-01-08 PROCEDURE — 3008F BODY MASS INDEX DOCD: CPT | Performed by: FAMILY MEDICINE

## 2020-01-08 RX ORDER — ERGOCALCIFEROL 1.25 MG/1
50000 CAPSULE ORAL WEEKLY
Qty: 12 CAPSULE | Refills: 1 | Status: SHIPPED | OUTPATIENT
Start: 2020-01-08 | End: 2021-04-13 | Stop reason: CLARIF

## 2020-01-08 RX ORDER — CEFUROXIME AXETIL 500 MG/1
500 TABLET ORAL EVERY 12 HOURS SCHEDULED
Qty: 20 TABLET | Refills: 0 | Status: SHIPPED | OUTPATIENT
Start: 2020-01-08 | End: 2020-01-18

## 2020-01-08 NOTE — PROGRESS NOTES
Assessment/Plan:    Patient is a 58-year-old female seen for respiratory infection  Her cough has been worsening  She is losing her voice  Have prescribed a 10 day course of cefuroxime  She can use Mucinex for cough  Push fluids  We also discussed her vitamin-D deficiency  Diagnoses and all orders for this visit:    Respiratory infection  -     cefuroxime (CEFTIN) 500 mg tablet; Take 1 tablet (500 mg total) by mouth every 12 (twelve) hours for 10 days    Vitamin D deficiency  -     ergocalciferol (VITAMIN D2) 50,000 units; Take 1 capsule (50,000 Units total) by mouth once a week          Subjective:   Chief Complaint   Patient presents with    Cold Like Symptoms     ST, body aches for 3 days, Now losing voice, cough, headache, head congestion      Patient ID: Mima Rawls is a 53 y o  female  Patietn with hoarseness, had been wheezing, achey in her throat, also with headache  The following portions of the patient's history were reviewed and updated as appropriate: allergies, current medications, past family history, past medical history, past social history, past surgical history and problem list     Review of Systems   Constitutional: Negative for chills and fever  HENT: Positive for congestion and voice change  Respiratory: Positive for cough and wheezing  Gastrointestinal: Negative for diarrhea and vomiting  Musculoskeletal: Positive for myalgias  Neurological: Positive for headaches  Negative for dizziness  Objective:      /80 (BP Location: Right arm, Patient Position: Sitting, Cuff Size: Adult)   Pulse 94   Temp 98 4 °F (36 9 °C) (Tympanic)   Resp 16   Ht 5' 4" (1 626 m)   Wt 59 kg (130 lb)   LMP  (LMP Unknown)   SpO2 98%   BMI 22 31 kg/m²          Physical Exam   Constitutional: She is oriented to person, place, and time  She appears well-nourished  No distress  HENT:   TM's ok  Thick post nasal drainage     Cardiovascular: Normal rate and regular rhythm  Pulmonary/Chest: Effort normal  She has wheezes (occasional)  Neurological: She is alert and oriented to person, place, and time  Skin: Skin is warm and dry  No rash noted  Psychiatric: She has a normal mood and affect  Nursing note and vitals reviewed

## 2020-01-08 NOTE — PATIENT INSTRUCTIONS
Start Vit D - once a week  For chol - Fish Oil - EPA and DHA to equal 1200 to 1500 mg a day    Red Yeast Rice - 1200 mg daily

## 2020-07-09 DIAGNOSIS — F41.9 ANXIETY: ICD-10-CM

## 2020-07-09 RX ORDER — ALPRAZOLAM 0.25 MG/1
0.25 TABLET ORAL
Qty: 30 TABLET | Refills: 0 | Status: SHIPPED | OUTPATIENT
Start: 2020-07-09 | End: 2021-01-18 | Stop reason: SDUPTHER

## 2020-08-28 ENCOUNTER — TELEPHONE (OUTPATIENT)
Dept: ADMINISTRATIVE | Facility: OTHER | Age: 55
End: 2020-08-28

## 2020-08-28 ENCOUNTER — OFFICE VISIT (OUTPATIENT)
Dept: FAMILY MEDICINE CLINIC | Facility: CLINIC | Age: 55
End: 2020-08-28
Payer: COMMERCIAL

## 2020-08-28 VITALS
HEIGHT: 64 IN | OXYGEN SATURATION: 98 % | BODY MASS INDEX: 22.84 KG/M2 | TEMPERATURE: 98.2 F | RESPIRATION RATE: 16 BRPM | SYSTOLIC BLOOD PRESSURE: 122 MMHG | WEIGHT: 133.8 LBS | DIASTOLIC BLOOD PRESSURE: 68 MMHG | HEART RATE: 104 BPM

## 2020-08-28 DIAGNOSIS — M72.2 PLANTAR FASCIITIS: Primary | ICD-10-CM

## 2020-08-28 PROCEDURE — 3008F BODY MASS INDEX DOCD: CPT | Performed by: FAMILY MEDICINE

## 2020-08-28 PROCEDURE — 1036F TOBACCO NON-USER: CPT | Performed by: FAMILY MEDICINE

## 2020-08-28 PROCEDURE — 99213 OFFICE O/P EST LOW 20 MIN: CPT | Performed by: FAMILY MEDICINE

## 2020-08-28 PROCEDURE — 3725F SCREEN DEPRESSION PERFORMED: CPT | Performed by: FAMILY MEDICINE

## 2020-08-28 RX ORDER — MELOXICAM 15 MG/1
15 TABLET ORAL DAILY
Qty: 30 TABLET | Refills: 5 | Status: SHIPPED | OUTPATIENT
Start: 2020-08-28 | End: 2020-10-19

## 2020-08-28 NOTE — TELEPHONE ENCOUNTER
Upon review of the In Basket request we were able to locate, review, and update the patient chart as requested for Pap Smear (HPV) aka Cervical Cancer Screening  Any additional questions or concerns should be emailed to the Practice Liaisons via [email or web address] email, please do not reply via In Basket      Thank you  Jamarcus Malik MA

## 2020-08-28 NOTE — TELEPHONE ENCOUNTER
----- Message from Danielle Montana MA sent at 8/28/2020 10:26 AM EDT -----  Regarding: [facility]  08/28/20 10:26 AM    Hello, our patient Remy De La Fuente has had Pap Smear (HPV) aka Cervical Cancer Screening completed/performed  Please assist in updating the patient chart by pulling the Care Everywhere (CE) document  The date of service is 6/14/17       Thank you,  Danielle Montana MA  [facility]

## 2020-09-23 ENCOUNTER — OFFICE VISIT (OUTPATIENT)
Dept: FAMILY MEDICINE CLINIC | Facility: CLINIC | Age: 55
End: 2020-09-23
Payer: COMMERCIAL

## 2020-09-23 VITALS
BODY MASS INDEX: 23.29 KG/M2 | DIASTOLIC BLOOD PRESSURE: 86 MMHG | HEIGHT: 64 IN | RESPIRATION RATE: 18 BRPM | HEART RATE: 115 BPM | WEIGHT: 136.4 LBS | SYSTOLIC BLOOD PRESSURE: 142 MMHG | OXYGEN SATURATION: 96 % | TEMPERATURE: 97.6 F

## 2020-09-23 DIAGNOSIS — Z12.31 BREAST CANCER SCREENING BY MAMMOGRAM: ICD-10-CM

## 2020-09-23 DIAGNOSIS — R22.1 MASS OF RIGHT SIDE OF NECK: Primary | ICD-10-CM

## 2020-09-23 PROCEDURE — 99213 OFFICE O/P EST LOW 20 MIN: CPT | Performed by: FAMILY MEDICINE

## 2020-09-23 RX ORDER — MELATONIN
1000 DAILY
COMMUNITY
End: 2022-04-19

## 2020-09-23 NOTE — PROGRESS NOTES
Assessment/Plan:   patient is 50-year-old female seen for difficulty swallowing and a lump in her neck  I did palpate a 1 x 2 cm nodule on the right and I am not sure if this is a thyroid nodule or lymph node  I have ordered ultrasound  Diagnoses and all orders for this visit:    Mass of right side of neck  -     US head neck soft tissue; Future    Breast cancer screening by mammogram  -     Mammo screening bilateral w 3d & cad; Future    Other orders  -     cholecalciferol (VITAMIN D3) 1,000 units tablet; Take 1,000 Units by mouth daily          Subjective:   Chief Complaint   Patient presents with    Mass     lump in neck        Patient ID: Solomon Avalos is a 63 y o  female  Patient noticed lump on left side of neck when putting on sun screen  Sometimes she feels that when she swallows water, it doesn't go down right  Thin liquids - she feels like she has to clear her throat  Son with history of thyroid cancer  Father has history of Zencker's diverticulum  The following portions of the patient's history were reviewed and updated as appropriate: allergies, current medications, past family history, past medical history, past social history, past surgical history and problem list     Review of Systems   Constitutional: Negative for chills and fever  HENT: Positive for trouble swallowing  Negative for congestion and sore throat  Respiratory: Negative for chest tightness  Cardiovascular: Negative for chest pain and palpitations  Gastrointestinal: Negative for abdominal pain, constipation, diarrhea and nausea  Genitourinary: Negative for difficulty urinating  Skin: Negative  Neurological: Negative for dizziness and headaches  Psychiatric/Behavioral: Negative            Objective:      /86 (BP Location: Left arm, Patient Position: Sitting)   Pulse (!) 115   Temp 97 6 °F (36 4 °C) (Tympanic)   Resp 18   Ht 5' 3 5" (1 613 m)   Wt 61 9 kg (136 lb 6 4 oz)   LMP  (LMP Unknown)   SpO2 96%   BMI 23 78 kg/m²          Physical Exam  Vitals signs and nursing note reviewed  Constitutional:       General: She is not in acute distress  HENT:      Head: Normocephalic  Right Ear: Tympanic membrane normal       Left Ear: Tympanic membrane normal    Neck:      Thyroid: No thyromegaly  Comments: 1 x 2 cm nodule right upper thyroid area vs lymph node  Cardiovascular:      Rate and Rhythm: Normal rate and regular rhythm  Heart sounds: Normal heart sounds  Pulmonary:      Effort: Pulmonary effort is normal       Breath sounds: Normal breath sounds  Lymphadenopathy:      Cervical: No cervical adenopathy  Skin:     General: Skin is warm and dry  Neurological:      Mental Status: She is alert and oriented to person, place, and time     Psychiatric:         Mood and Affect: Mood normal

## 2020-09-25 ENCOUNTER — HOSPITAL ENCOUNTER (OUTPATIENT)
Dept: ULTRASOUND IMAGING | Facility: HOSPITAL | Age: 55
Discharge: HOME | End: 2020-09-25
Payer: COMMERCIAL

## 2020-09-25 DIAGNOSIS — R22.1 MASS OF RIGHT SIDE OF NECK: ICD-10-CM

## 2020-09-25 PROCEDURE — 76536 US EXAM OF HEAD AND NECK: CPT

## 2020-09-28 PROBLEM — S83.419A SPRAIN OF MCL (MEDIAL COLLATERAL LIGAMENT) OF KNEE: Status: ACTIVE | Noted: 2019-07-24

## 2020-09-28 PROBLEM — R25.1 TREMOR: Status: ACTIVE | Noted: 2020-09-28

## 2020-09-28 PROBLEM — M76.70 PERONEAL TENDINITIS: Status: ACTIVE | Noted: 2019-07-24

## 2020-09-28 PROBLEM — M19.049 LOCALIZED, PRIMARY OSTEOARTHRITIS OF HAND: Status: ACTIVE | Noted: 2020-09-28

## 2020-09-30 ENCOUNTER — TELEPHONE (OUTPATIENT)
Dept: FAMILY MEDICINE CLINIC | Facility: CLINIC | Age: 55
End: 2020-09-30

## 2020-10-13 ENCOUNTER — TELEPHONE (OUTPATIENT)
Dept: FAMILY MEDICINE CLINIC | Facility: CLINIC | Age: 55
End: 2020-10-13

## 2020-10-15 ENCOUNTER — TELEPHONE (OUTPATIENT)
Dept: FAMILY MEDICINE CLINIC | Facility: CLINIC | Age: 55
End: 2020-10-15

## 2020-10-16 ENCOUNTER — TELEPHONE (OUTPATIENT)
Dept: FAMILY MEDICINE CLINIC | Facility: CLINIC | Age: 55
End: 2020-10-16

## 2020-10-16 DIAGNOSIS — R22.1 MASS OF RIGHT SIDE OF NECK: Primary | ICD-10-CM

## 2020-10-19 ENCOUNTER — OFFICE VISIT (OUTPATIENT)
Dept: FAMILY MEDICINE CLINIC | Facility: CLINIC | Age: 55
End: 2020-10-19
Payer: COMMERCIAL

## 2020-10-19 VITALS
WEIGHT: 136 LBS | TEMPERATURE: 98 F | OXYGEN SATURATION: 99 % | BODY MASS INDEX: 23.22 KG/M2 | HEART RATE: 123 BPM | DIASTOLIC BLOOD PRESSURE: 60 MMHG | SYSTOLIC BLOOD PRESSURE: 130 MMHG | HEIGHT: 64 IN

## 2020-10-19 DIAGNOSIS — R22.1 LUMP IN NECK: Primary | ICD-10-CM

## 2020-10-19 PROCEDURE — 1036F TOBACCO NON-USER: CPT | Performed by: FAMILY MEDICINE

## 2020-10-19 PROCEDURE — 99213 OFFICE O/P EST LOW 20 MIN: CPT | Performed by: FAMILY MEDICINE

## 2020-10-28 ENCOUNTER — HOSPITAL ENCOUNTER (OUTPATIENT)
Dept: MRI IMAGING | Facility: HOSPITAL | Age: 55
Discharge: HOME | End: 2020-10-28
Payer: COMMERCIAL

## 2020-10-28 DIAGNOSIS — R22.1 MASS OF RIGHT SIDE OF NECK: ICD-10-CM

## 2020-10-28 PROCEDURE — A9585 GADOBUTROL INJECTION: HCPCS | Performed by: FAMILY MEDICINE

## 2020-10-28 PROCEDURE — 70543 MRI ORBT/FAC/NCK W/O &W/DYE: CPT

## 2020-10-28 PROCEDURE — G1004 CDSM NDSC: HCPCS

## 2020-10-28 RX ADMIN — GADOBUTROL 6 ML: 604.72 INJECTION INTRAVENOUS at 11:59

## 2020-12-21 ENCOUNTER — HOSPITAL ENCOUNTER (OUTPATIENT)
Dept: MAMMOGRAPHY | Facility: MEDICAL CENTER | Age: 55
Discharge: HOME | End: 2020-12-21
Payer: COMMERCIAL

## 2020-12-21 DIAGNOSIS — Z12.31 BREAST CANCER SCREENING BY MAMMOGRAM: ICD-10-CM

## 2020-12-21 PROCEDURE — 77063 BREAST TOMOSYNTHESIS BI: CPT

## 2020-12-21 PROCEDURE — 77067 SCR MAMMO BI INCL CAD: CPT

## 2021-01-17 ENCOUNTER — PATIENT MESSAGE (OUTPATIENT)
Dept: FAMILY MEDICINE CLINIC | Facility: CLINIC | Age: 56
End: 2021-01-17

## 2021-01-17 DIAGNOSIS — F41.9 ANXIETY: ICD-10-CM

## 2021-01-18 NOTE — TELEPHONE ENCOUNTER
Last fill: 7/9 #30  Last appt: 12/19/19- has been seen after this for acute visits  No future appt scheduled

## 2021-01-19 RX ORDER — ALPRAZOLAM 0.25 MG/1
0.25 TABLET ORAL
Qty: 30 TABLET | Refills: 0 | Status: SHIPPED | OUTPATIENT
Start: 2021-01-19 | End: 2021-12-20 | Stop reason: SDUPTHER

## 2021-02-05 DIAGNOSIS — N83.202 CYST OF LEFT OVARY: Primary | ICD-10-CM

## 2021-02-05 NOTE — PROGRESS NOTES
Please call patient  We received notification from [facility]  She had a pelvic ultrasound done in July of 2019 which showed an ovarian cyst   Radiologist at that time recommended a recheck in 1 year  They believe this cyst to be completely innocent however they felt a recheck was necessary  Because it has now been 18 months they sent out a reminder to get that done  I put an order in the chart for a pelvic ultrasound which can be scheduled through central scheduling at any time

## 2021-02-22 ENCOUNTER — TELEPHONE (OUTPATIENT)
Dept: FAMILY MEDICINE CLINIC | Facility: CLINIC | Age: 56
End: 2021-02-22

## 2021-02-22 DIAGNOSIS — M62.838 MUSCLE SPASM: Primary | ICD-10-CM

## 2021-02-22 RX ORDER — CYCLOBENZAPRINE HCL 5 MG
5 TABLET ORAL
Qty: 30 TABLET | Refills: 0 | Status: SHIPPED | OUTPATIENT
Start: 2021-02-22 | End: 2021-04-13 | Stop reason: ALTCHOICE

## 2021-02-22 NOTE — TELEPHONE ENCOUNTER
Pt called asking for a new script for Flexeril 5mg tabs one at bedtime, states she take this for her back but last prescribed was 2018  Please advise

## 2021-03-10 DIAGNOSIS — Z23 ENCOUNTER FOR IMMUNIZATION: ICD-10-CM

## 2021-03-23 ENCOUNTER — OFFICE VISIT (OUTPATIENT)
Dept: FAMILY MEDICINE CLINIC | Facility: CLINIC | Age: 56
End: 2021-03-23
Payer: COMMERCIAL

## 2021-03-23 ENCOUNTER — TELEPHONE (OUTPATIENT)
Dept: FAMILY MEDICINE CLINIC | Facility: CLINIC | Age: 56
End: 2021-03-23

## 2021-03-23 VITALS
WEIGHT: 140 LBS | OXYGEN SATURATION: 100 % | DIASTOLIC BLOOD PRESSURE: 76 MMHG | TEMPERATURE: 97.8 F | HEART RATE: 123 BPM | BODY MASS INDEX: 23.9 KG/M2 | SYSTOLIC BLOOD PRESSURE: 122 MMHG | HEIGHT: 64 IN

## 2021-03-23 DIAGNOSIS — M54.16 LUMBAR BACK PAIN WITH RADICULOPATHY AFFECTING RIGHT LOWER EXTREMITY: ICD-10-CM

## 2021-03-23 DIAGNOSIS — R30.0 DYSURIA: ICD-10-CM

## 2021-03-23 DIAGNOSIS — N39.0 URINARY TRACT INFECTION WITHOUT HEMATURIA, SITE UNSPECIFIED: Primary | ICD-10-CM

## 2021-03-23 LAB
SL AMB  POCT GLUCOSE, UA: ABNORMAL
SL AMB LEUKOCYTE ESTERASE,UA: ABNORMAL
SL AMB POCT BILIRUBIN,UA: ABNORMAL
SL AMB POCT BLOOD,UA: ABNORMAL
SL AMB POCT CLARITY,UA: ABNORMAL
SL AMB POCT COLOR,UA: YELLOW
SL AMB POCT KETONES,UA: ABNORMAL
SL AMB POCT NITRITE,UA: ABNORMAL
SL AMB POCT PH,UA: 5.5
SL AMB POCT SPECIFIC GRAVITY,UA: 1.01
SL AMB POCT URINE PROTEIN: ABNORMAL
SL AMB POCT UROBILINOGEN: 0.2

## 2021-03-23 PROCEDURE — 1036F TOBACCO NON-USER: CPT | Performed by: FAMILY MEDICINE

## 2021-03-23 PROCEDURE — 3008F BODY MASS INDEX DOCD: CPT | Performed by: FAMILY MEDICINE

## 2021-03-23 PROCEDURE — 81003 URINALYSIS AUTO W/O SCOPE: CPT | Performed by: FAMILY MEDICINE

## 2021-03-23 PROCEDURE — 99214 OFFICE O/P EST MOD 30 MIN: CPT | Performed by: FAMILY MEDICINE

## 2021-03-23 RX ORDER — NITROFURANTOIN 25; 75 MG/1; MG/1
100 CAPSULE ORAL 2 TIMES DAILY
Qty: 10 CAPSULE | Refills: 0 | Status: SHIPPED | OUTPATIENT
Start: 2021-03-23 | End: 2021-03-28

## 2021-03-23 NOTE — ASSESSMENT & PLAN NOTE
Chronic low back pain with exacerbation  She is taking Flexeril which is helping her symptoms  Previous MRI from 2019 was reviewed  Patient was referred to PT for evaluation and treatment  She has follow up appointment with her PCP Dr Jose Jacobs in several weeks  Follow up sooner if symptoms worsen or do not improve

## 2021-03-23 NOTE — ASSESSMENT & PLAN NOTE
POCT UA + for urinary tract infection  Patient instructed to increase water intake and start antibiotic which was prescribed:  Macrobid BID x 5 days   Obtain urine culture  Follow up in 1 week if symptoms have not improved or if they worsened

## 2021-03-23 NOTE — PROGRESS NOTES
Assessment/Plan:    1  Urinary tract infection without hematuria, site unspecified  Assessment & Plan:  POCT UA + for urinary tract infection  Patient instructed to increase water intake and start antibiotic which was prescribed:  Macrobid BID x 5 days   Obtain urine culture  Follow up in 1 week if symptoms have not improved or if they worsened    Orders:  -     nitrofurantoin (MACROBID) 100 mg capsule; Take 1 capsule (100 mg total) by mouth 2 (two) times a day for 5 days  -     Urine culture    2  Dysuria  -     POCT urine dip auto non-scope  -     nitrofurantoin (MACROBID) 100 mg capsule; Take 1 capsule (100 mg total) by mouth 2 (two) times a day for 5 days  -     Urine culture    3  Lumbar back pain with radiculopathy affecting right lower extremity  Assessment & Plan:  Chronic low back pain with exacerbation  She is taking Flexeril which is helping her symptoms  Previous MRI from 2019 was reviewed  Patient was referred to PT for evaluation and treatment  She has follow up appointment with her PCP Dr Pedro Martell in several weeks  Follow up sooner if symptoms worsen or do not improve  Orders:  -     Ambulatory referral to Physical Therapy; Future      Subjective:      Patient ID: Kyle Saldaña is a 63 y o  female  HPI    Paitent has had urinary frequency and bursing with urination x 2 days  No blood in urine but the urine is smelling stronger  She states that she went bike riding 3 days ago and was sweating a lot so she believes that may have caused her UTI  She does not get frequent UTIs  No fever, suprapubic pain or flank pain  Patient has acute on chronic exacerbation of her low back pain with radiculopathy  She denies history of injury  MRI of her lumbar spine was completed in 2019 which shows DJD  Her symptoms usually improve with physical therapy  She was also recently prescribed a muscle relaxer by her PCP which she states is helping relieve her pain    The pain shoots down her right leg       The following portions of the patient's history were reviewed and updated as appropriate: allergies, current medications, past family history, past medical history, past social history, past surgical history, and problem list       Current Outpatient Medications:     ALPRAZolam (XANAX) 0 25 mg tablet, Take 1 tablet (0 25 mg total) by mouth daily at bedtime as needed for anxiety, Disp: 30 tablet, Rfl: 0    budesonide-formoterol (SYMBICORT) 80-4 5 MCG/ACT inhaler, Inhale 2 puffs 2 (two) times a day, Disp: , Rfl:     cholecalciferol (VITAMIN D3) 1,000 units tablet, Take 1,000 Units by mouth daily, Disp: , Rfl:     cyclobenzaprine (FLEXERIL) 5 mg tablet, Take 1 tablet (5 mg total) by mouth daily at bedtime as needed for muscle spasms, Disp: 30 tablet, Rfl: 0    hyoscyamine (ANASPAZ,LEVSIN) 0 125 MG tablet, Take 1 tablet (0 125 mg total) by mouth every 4 (four) hours as needed for cramping, Disp: 30 tablet, Rfl: 0    levalbuterol (XOPENEX HFA) 45 mcg/act inhaler, Inhale 2 puffs every 4 (four) hours as needed, Disp: , Rfl: 0    lidocaine (XYLOCAINE) 2 % topical gel, Apply 1 application topically, Disp: , Rfl:     nitroglycerin (NITROSTAT) 0 6 mg SL tablet, Place 1 tablet (0 6 mg total) under the tongue every 5 (five) minutes as needed for chest pain, Disp: 90 tablet, Rfl: 1    omeprazole (PriLOSEC) 20 mg delayed release capsule, Take 20 mg by mouth daily, Disp: , Rfl:     ergocalciferol (VITAMIN D2) 50,000 units, Take 1 capsule (50,000 Units total) by mouth once a week (Patient not taking: Reported on 3/23/2021), Disp: 12 capsule, Rfl: 1    nitrofurantoin (MACROBID) 100 mg capsule, Take 1 capsule (100 mg total) by mouth 2 (two) times a day for 5 days, Disp: 10 capsule, Rfl: 0      Review of Systems   Constitutional: Negative for chills and fever  HENT: Negative for ear pain and sore throat  Eyes: Negative for pain and visual disturbance  Respiratory: Negative for cough and shortness of breath  Cardiovascular: Negative for chest pain and palpitations  Gastrointestinal: Negative for abdominal pain and vomiting  Genitourinary: Positive for dysuria and frequency  Negative for hematuria, urgency and vaginal discharge  Musculoskeletal: Positive for back pain  Negative for arthralgias  Skin: Negative for color change and rash  Neurological: Negative for seizures and syncope  All other systems reviewed and are negative  Objective:      /76 (BP Location: Left arm, Patient Position: Sitting, Cuff Size: Standard)   Pulse (!) 123   Temp 97 8 °F (36 6 °C) (Tympanic)   Ht 5' 3 5" (1 613 m)   Wt 63 5 kg (140 lb)   LMP  (LMP Unknown)   SpO2 100%   BMI 24 41 kg/m²          Physical Exam  Vitals signs and nursing note reviewed  Constitutional:       General: She is not in acute distress  Appearance: Normal appearance  She is not ill-appearing  HENT:      Head: Normocephalic and atraumatic  Eyes:      Conjunctiva/sclera: Conjunctivae normal    Neck:      Musculoskeletal: Normal range of motion  Cardiovascular:      Rate and Rhythm: Normal rate and regular rhythm  Heart sounds: Normal heart sounds  No murmur  Pulmonary:      Effort: Pulmonary effort is normal  No respiratory distress  Breath sounds: Normal breath sounds  No wheezing  Abdominal:      General: Abdomen is flat  Bowel sounds are normal  There is no distension  Palpations: Abdomen is soft  Tenderness: There is no abdominal tenderness  There is no right CVA tenderness or left CVA tenderness  Musculoskeletal: Normal range of motion  Skin:     General: Skin is warm  Neurological:      General: No focal deficit present  Mental Status: She is alert and oriented to person, place, and time  Psychiatric:         Mood and Affect: Mood normal          Behavior: Behavior normal          Thought Content:  Thought content normal          Judgment: Judgment normal

## 2021-03-23 NOTE — TELEPHONE ENCOUNTER
Called Klaus Kwan @[phone] spoke to Yasmin and schedule pick-up for patient urine specimen    Confirmation number [id number]

## 2021-03-25 LAB
BACTERIA UR CULT: NORMAL
Lab: NO GROWTH

## 2021-03-26 ENCOUNTER — IMMUNIZATIONS (OUTPATIENT)
Dept: FAMILY MEDICINE CLINIC | Facility: HOSPITAL | Age: 56
End: 2021-03-26

## 2021-03-26 ENCOUNTER — TELEPHONE (OUTPATIENT)
Dept: FAMILY MEDICINE CLINIC | Facility: CLINIC | Age: 56
End: 2021-03-26

## 2021-03-26 DIAGNOSIS — Z23 ENCOUNTER FOR IMMUNIZATION: Primary | ICD-10-CM

## 2021-03-26 PROCEDURE — 91300 SARS-COV-2 / COVID-19 MRNA VACCINE (PFIZER-BIONTECH) 30 MCG: CPT

## 2021-03-26 PROCEDURE — 0001A SARS-COV-2 / COVID-19 MRNA VACCINE (PFIZER-BIONTECH) 30 MCG: CPT

## 2021-03-26 NOTE — TELEPHONE ENCOUNTER
Pt called, the urine culture showed no growth  Pt asking if she should continue the abx? She is having symptoms but would not like to take an abx if it's not needed

## 2021-03-26 NOTE — TELEPHONE ENCOUNTER
Please call patient- she should continue and complete the antibiotic due to her symptoms  Her urine dip in the office was positive for infection    Sometimes urine culture will be negative if bacteria count is low  Thank you

## 2021-04-06 ENCOUNTER — EVALUATION (OUTPATIENT)
Dept: PHYSICAL THERAPY | Facility: CLINIC | Age: 56
End: 2021-04-06
Payer: COMMERCIAL

## 2021-04-06 ENCOUNTER — APPOINTMENT (EMERGENCY)
Dept: MRI IMAGING | Facility: HOSPITAL | Age: 56
End: 2021-04-06
Payer: COMMERCIAL

## 2021-04-06 ENCOUNTER — TRANSCRIBE ORDERS (OUTPATIENT)
Dept: NEUROSURGERY | Facility: CLINIC | Age: 56
End: 2021-04-06

## 2021-04-06 ENCOUNTER — HOSPITAL ENCOUNTER (EMERGENCY)
Facility: HOSPITAL | Age: 56
Discharge: HOME | End: 2021-04-06
Attending: EMERGENCY MEDICINE | Admitting: EMERGENCY MEDICINE
Payer: COMMERCIAL

## 2021-04-06 VITALS
RESPIRATION RATE: 18 BRPM | OXYGEN SATURATION: 100 % | BODY MASS INDEX: 24.14 KG/M2 | SYSTOLIC BLOOD PRESSURE: 111 MMHG | WEIGHT: 138.45 LBS | TEMPERATURE: 97.9 F | HEART RATE: 87 BPM | DIASTOLIC BLOOD PRESSURE: 55 MMHG

## 2021-04-06 DIAGNOSIS — M54.16 LUMBAR BACK PAIN WITH RADICULOPATHY AFFECTING RIGHT LOWER EXTREMITY: Primary | ICD-10-CM

## 2021-04-06 DIAGNOSIS — M54.40 LOW BACK PAIN WITH SCIATICA: Primary | ICD-10-CM

## 2021-04-06 DIAGNOSIS — M48.061 LUMBAR STENOSIS: ICD-10-CM

## 2021-04-06 DIAGNOSIS — M54.50 LOWER BACK PAIN: Primary | ICD-10-CM

## 2021-04-06 LAB
ANION GAP SERPL CALCULATED.3IONS-SCNC: 10 MMOL/L (ref 4–13)
BASOPHILS # BLD AUTO: 0.04 THOUSANDS/ΜL (ref 0–0.1)
BASOPHILS NFR BLD AUTO: 1 % (ref 0–1)
BILIRUB UR QL STRIP: NEGATIVE
BUN SERPL-MCNC: 12 MG/DL (ref 5–25)
CALCIUM SERPL-MCNC: 9.3 MG/DL (ref 8.3–10.1)
CHLORIDE SERPL-SCNC: 107 MMOL/L (ref 100–108)
CLARITY UR: CLEAR
CO2 SERPL-SCNC: 27 MMOL/L (ref 21–32)
COLOR UR: YELLOW
CREAT SERPL-MCNC: 0.87 MG/DL (ref 0.6–1.3)
EOSINOPHIL # BLD AUTO: 0.09 THOUSAND/ΜL (ref 0–0.61)
EOSINOPHIL NFR BLD AUTO: 3 % (ref 0–6)
ERYTHROCYTE [DISTWIDTH] IN BLOOD BY AUTOMATED COUNT: 12 % (ref 11.6–15.1)
GFR SERPL CREATININE-BSD FRML MDRD: 75 ML/MIN/1.73SQ M
GLUCOSE SERPL-MCNC: 103 MG/DL (ref 65–140)
GLUCOSE UR STRIP-MCNC: NEGATIVE MG/DL
HCT VFR BLD AUTO: 40.4 % (ref 34.8–46.1)
HGB BLD-MCNC: 13.6 G/DL (ref 11.5–15.4)
HGB UR QL STRIP.AUTO: NEGATIVE
IMM GRANULOCYTES # BLD AUTO: 0.01 THOUSAND/UL (ref 0–0.2)
IMM GRANULOCYTES NFR BLD AUTO: 0 % (ref 0–2)
KETONES UR STRIP-MCNC: NEGATIVE MG/DL
LEUKOCYTE ESTERASE UR QL STRIP: NEGATIVE
LYMPHOCYTES # BLD AUTO: 1.08 THOUSANDS/ΜL (ref 0.6–4.47)
LYMPHOCYTES NFR BLD AUTO: 30 % (ref 14–44)
MCH RBC QN AUTO: 31.1 PG (ref 26.8–34.3)
MCHC RBC AUTO-ENTMCNC: 33.7 G/DL (ref 31.4–37.4)
MCV RBC AUTO: 92 FL (ref 82–98)
MONOCYTES # BLD AUTO: 0.24 THOUSAND/ΜL (ref 0.17–1.22)
MONOCYTES NFR BLD AUTO: 7 % (ref 4–12)
NEUTROPHILS # BLD AUTO: 2.18 THOUSANDS/ΜL (ref 1.85–7.62)
NEUTS SEG NFR BLD AUTO: 59 % (ref 43–75)
NITRITE UR QL STRIP: NEGATIVE
NRBC BLD AUTO-RTO: 0 /100 WBCS
PH UR STRIP.AUTO: 5.5 [PH] (ref 4.5–8)
PLATELET # BLD AUTO: 219 THOUSANDS/UL (ref 149–390)
PMV BLD AUTO: 10.4 FL (ref 8.9–12.7)
POTASSIUM SERPL-SCNC: 3.9 MMOL/L (ref 3.5–5.3)
PROT UR STRIP-MCNC: NEGATIVE MG/DL
RBC # BLD AUTO: 4.37 MILLION/UL (ref 3.81–5.12)
SODIUM SERPL-SCNC: 144 MMOL/L (ref 136–145)
SP GR UR STRIP.AUTO: <=1.005 (ref 1–1.03)
UROBILINOGEN UR QL STRIP.AUTO: 0.2 E.U./DL
WBC # BLD AUTO: 3.64 THOUSAND/UL (ref 4.31–10.16)

## 2021-04-06 PROCEDURE — G1004 CDSM NDSC: HCPCS

## 2021-04-06 PROCEDURE — 97161 PT EVAL LOW COMPLEX 20 MIN: CPT

## 2021-04-06 PROCEDURE — NC001 PR NO CHARGE: Performed by: PHYSICIAN ASSISTANT

## 2021-04-06 PROCEDURE — 81003 URINALYSIS AUTO W/O SCOPE: CPT

## 2021-04-06 PROCEDURE — 51798 US URINE CAPACITY MEASURE: CPT

## 2021-04-06 PROCEDURE — 99284 EMERGENCY DEPT VISIT MOD MDM: CPT

## 2021-04-06 PROCEDURE — 85025 COMPLETE CBC W/AUTO DIFF WBC: CPT | Performed by: PHYSICIAN ASSISTANT

## 2021-04-06 PROCEDURE — 36415 COLL VENOUS BLD VENIPUNCTURE: CPT | Performed by: PHYSICIAN ASSISTANT

## 2021-04-06 PROCEDURE — 99285 EMERGENCY DEPT VISIT HI MDM: CPT | Performed by: PHYSICIAN ASSISTANT

## 2021-04-06 PROCEDURE — 80048 BASIC METABOLIC PNL TOTAL CA: CPT | Performed by: PHYSICIAN ASSISTANT

## 2021-04-06 PROCEDURE — 72148 MRI LUMBAR SPINE W/O DYE: CPT

## 2021-04-06 NOTE — TELEMEDICINE
On-Call Telephone Note    Contacted by Santos Regalado via TT  Russell Santos 63 y o  female MRN: [id number]  Unit/Bed#: ED 04 Encounter: [id number]    Per provider report, This is a 13-year-old female with history of lumbar spine stenosis, noon disc herniation who presented with progressive symptoms over 1-2 weeks  She has chronic low back pain and right-sided sciatica and now presented with numbness of her right inner thigh and groin with intermittent bladder incontinence for the past week  PVR was completed without evidence of retention  /55 (BP Location: Left arm)   Pulse 87   Temp 97 9 °F (36 6 °C) (Oral)   Resp 18   Wt 62 8 kg (138 lb 7 2 oz)   LMP  (LMP Unknown)   SpO2 100%   BMI 24 14 kg/m²      Clinical exam per provider report,   Lower extremity exam largely nonfocal with the exception of reported diminished sensation to right foot as compared to left  Rectal tone is intact  Imaging personally reviewed  MRI lumbar spine without contrast April 6, 2021 -  Multilevel degenerative changes most noted at L4-5 with moderate broad-based central disc herniation slightly extruded superiorly and inferiorly  In addition there is facet arthropathy and ligamentum flavum hypertrophy at this level causing at least moderate to severe stenosis  Assessment and Plan  Discussed given duration of symptoms and not acute within the last 24 hours along with exam showing full strength of her lower extremities and intact rectal tone, patient will have the option to be followed closely in the outpatient setting versus admission to [facility] with neurosurgical evaluation  Per ED provider  Conversation with patient, will plan close outpatient follow-up  Patient is aware to return to the office with any progressive symptoms  All questions answered  Provider is in agreement with the course of action   A total of 15 minutes was spent discussing the presentation, physical exam and formulating a management plan with the provider

## 2021-04-06 NOTE — ED NOTES
Pt transported to [facility]     Gael Lai, MARISSA  04/06/21 9820

## 2021-04-06 NOTE — DISCHARGE INSTRUCTIONS
Please refer to the attached information for strict return instructions  If symptoms worsen or new symptoms develop please return to the ER  Please follow up with neurosurgery, call to confirm appointment as soon as possible  If you develop worsening lower extremity numbness/weakness, worsening bowel or bladder dysfunction, or any new/worsening symptoms of concern

## 2021-04-06 NOTE — PROGRESS NOTES
PT Evaluation     Today's date: 2021  Patient name: Sarahi Ly  : 1965  MRN: [id number]  Referring provider: Ronald Cui DO  Dx:   Encounter Diagnosis     ICD-10-CM    1  Lumbar back pain with radiculopathy affecting right lower extremity  M54 [id number] Ambulatory referral to Physical Therapy                  Assessment  Assessment details: Sarahi Ly is a 63 y o  female who came to outpatient PT on 2021  Patient presents with complaints of LBP that began back in mid to late January  It began as LBP for about 1 week, but then she began to have Right groin pain and Right lateral hip pain, as well as lateral calf pain and N/T in lateral toes  Numbness and tingling is constant in toes  Patient would benefit from further consultation for concerns for Cauda Equina  Sarahi Ly has the impairments listed below resulting in functional impairment and are having a negative impact on her quality of life  Patient is appropriate and would benefit from skilled PT intervention to have an optimal return to function and achieve patient specific goals  No Red Flags  All of the patient's questions were answered to their satisfaction and the patient agreed to the plan of care  Patient's greatest impairments are:  1 ) deficits of mobility of Lumbopelvic region - to be addressed with joint mobilizations and self and therapist stretching   2 ) deficits in LE neural mobility - to be addressed with nerve mobility exercises  3 ) motor coordination deficits of deep core musculature - to be addressed with neuromuscular reeducation, progressive resistance exercises,  functional activities  4 ) deficits of motor coordination of B/L hip musculature - to be addressed with neuromuscular reeducation, progressive resistance exercises,  functional activities  Primary movement impairment diagnosis of Lumbar extension bias resulting in pathoanatomical symptoms of possible herniated disc   The encounter diagnosis was Lumbar back pain with radiculopathy affecting right lower extremity  and limiting her ability to walk, sit, stand, recreation, etc  However, do to patient's symptoms as described throughout the note she would benefit from further consultation due to concern for Cauda Equina  Impairments: abnormal coordination, abnormal muscle firing, abnormal or restricted ROM, activity intolerance, impaired physical strength, lacks appropriate home exercise program, pain with function, poor posture  and poor body mechanics    Symptom irritability: moderateUnderstanding of Dx/Px/POC: good   Prognosis: good    Goals  Pending medical status  STG (3-4 weeks):  Patient will successfully transition to HEP  Patient will be able to manage symptoms independently  Patient will be able to perform proper TA contraction  Patient will report no limitations in ADL's  Patient will report improved frequency and intensity of radicular symptoms  Patient will improve deep core contraction score to **/10  - TBA      LTG (6-8weeks):  Patient will improve deep core contraction score to **/10  - TBA  Patient will be able to demonstrate proper activation of core musculature during dynamic activities  Patient will report no limitations in work related activities  Patient will report no problems in recreational activities  Patient will demonstrate proper lifting mechanics  Patient will improve FOTO score to projected value by the end of treatment  Plan  Plan details: POC pending medical status  Patient was advised to go to ED due to symptoms described throughout note for concern for Cauda Equina    Patient would benefit from: skilled physical therapy  Referral necessary: No  Planned modality interventions: cryotherapy and thermotherapy: hydrocollator packs  Other planned modality interventions: as prn  Planned therapy interventions: abdominal trunk stabilization, body mechanics training, functional ROM exercises, flexibility, home exercise program, therapeutic exercise, therapeutic activities, stretching, strengthening, patient education, postural training, neuromuscular re-education, manual therapy and joint mobilization  Frequency: 2x week  Duration in weeks: 8  Treatment plan discussed with: patient        Subjective Evaluation    History of Present Illness  Mechanism of injury: Darryl Gonzales is a 63 y o  female who came to outpatient PT on 2021  Patient presents with complaints of LBP that began back in mid to late January  It began as LBP for about 1 week, but then she began to have Right groin pain and Right lateral hip pain, as well as lateral calf pain, and N/T in lateral toes  Numbness and tingling is constant in toes  The patient's greatest concerns are  the pain she is experiencing, worry over not knowing what's wrong, concern at no signs of improvement, fear of not being able to keep active and future ill health (and wanting to prevent it)  Patient is currently taking Ibuprofen for pain  Reports it is helping  Patient work:      Denied any [address]  Patient finds aggravating factors are:  1 ) sitting  2 ) driving  3 ) bending forward  4 ) gardening  5 ) getting up after sitting    Patients finds most relieving factors are:  1 ) standing    Patient would benefit from further consultation for MRI for concern for Cauda Equina  Patient's goals for therapy: decrease pain, increase motion, increase strength  Patient's goals: improve symptoms  Pain  Current pain ratin  At best pain ratin  At worst pain ratin          Objective     Concurrent Complaints  Positive for bladder dysfunction and saddle (S4) numbness       Neurological Testing     Sensation     Lumbar   Left   Intact: light touch    Right   Diminished: light touch    Comments   Right light touch: L5, S1, Saddle anesthesia (N/T)    Reflexes   Left   Patellar (L4): normal (2+)  Achilles (S1): normal (2+)    Right   Patellar (L4): normal (2+)  Achilles (S1): normal (2+)    Active Range of Motion     Lumbar   Flexion:  WFL  Extension:  Restriction level: minimal  Mechanical Assessment    Cervical      Thoracic      Lumbar    Standing flexion: repeated movements   Pain intensity: worse  Pain level: increased  increased symptoms in toes  Standing extension: repeated movements  Pain location: centralized  Pain intensity: better    Strength/Myotome Testing     Lumbar   Left   Heel walk: normal  Toe walk: normal    Right   Heel walk: normal  Toe walk: normal    Left Ankle/Foot   Dorsiflexion: 4+  Plantar flexion: 5    Right Ankle/Foot   Dorsiflexion: 4+  Plantar flexion: 5             Precautions: H/o Anxiety, Tremor, Asthma      Manuals             Nerve Glide                                                    Neuro Re-Ed             TA in supine             TA leg drop                                                                              Ther Ex             Treadmill             Standing Ext             PPU             Clamshells             Bridge                                                    Ther Activity                                       Gait Training                                       Modalities                                             Yara Part attended physical therapy for an IE  Advised patient to go to ED immediately due to Red Flags symptoms as described in note above  Patient did not return as she required surgery  Patient will be discharged from PT at this time  Thank you

## 2021-04-06 NOTE — Clinical Note
Joan Gillette was seen and treated in our emergency department on 4/6/2021  Diagnosis:     Jerry Pappas    She may return on this date: 04/07/2021         If you have any questions or concerns, please don't hesitate to call        Mat Santana PA-C    ______________________________           _______________          _______________  Hospital Representative                              Date                                Time

## 2021-04-07 NOTE — ED PROVIDER NOTES
History  Chief Complaint   Patient presents with    Back Pain     pt c/o lower back pain that radiates to right leg  ongoing for about a month  pt states pain radiates to right groin/vaginal area causing numbness  pt has been experiencing urinary incontinence worse in the last two weeks pt was suppose to go to physical therapy but was told she needed to be evaluated  Jerry Pappas is a 64 yo F, history of lumbar stenosis, lumbar disc herniation, sciatica, presenting with ongoing right lower extremity pain which has been ongoing for the past few months as well as R sided groin/thigh numbness which has been progressive over the past 1-2 weeks  Patient also notes for the past week she has been experiencing episodes of bladder incontinence, however she has also had normal urinary voids over this time  No fecal incontinence of retention  No new/worsening lower extremity weakness  No recent fall, injury, or trauma  Patient reports she takes ibuprofen at home for low back/leg pain with moderate relief  Patient reports she was evaluated by her physical therapist today and advised to proceed to ED for further evaluation  History provided by:  Patient   used: No    Back Pain  Location:  Lumbar spine  Quality:  Shooting  Radiates to: R leg  Timing:  Constant  Progression:  Worsening  Chronicity:  Chronic  Context: not falling, not lifting heavy objects and not recent injury    Relieved by:  NSAIDs  Worsened by:  Bending, twisting and touching  Associated symptoms: bladder incontinence, leg pain and numbness    Associated symptoms: no abdominal pain, no bowel incontinence, no chest pain, no dysuria, no fever, no headaches, no paresthesias, no pelvic pain, no perianal numbness and no weakness    Risk factors: no hx of cancer, no hx of osteoporosis and no recent surgery        Prior to Admission Medications   Prescriptions Last Dose Informant Patient Reported? Taking?    ALPRAZolam (XANAX) 0 25 mg tablet No Yes   Sig: Take 1 tablet (0 25 mg total) by mouth daily at bedtime as needed for anxiety   budesonide-formoterol (SYMBICORT) 80-4 5 MCG/ACT inhaler   Yes Yes   Sig: Inhale 2 puffs 2 (two) times a day   cholecalciferol (VITAMIN D3) 1,000 units tablet   Yes Yes   Sig: Take 1,000 Units by mouth daily   cyclobenzaprine (FLEXERIL) 5 mg tablet   No Yes   Sig: Take 1 tablet (5 mg total) by mouth daily at bedtime as needed for muscle spasms   ergocalciferol (VITAMIN D2) 50,000 units Not Taking at Unknown time  No No   Sig: Take 1 capsule (50,000 Units total) by mouth once a week   Patient not taking: Reported on 3/23/2021   hyoscyamine (ANASPAZ,LEVSIN) 0 125 MG tablet   No Yes   Sig: Take 1 tablet (0 125 mg total) by mouth every 4 (four) hours as needed for cramping   levalbuterol (XOPENEX HFA) 45 mcg/act inhaler   Yes Yes   Sig: Inhale 2 puffs every 4 (four) hours as needed   lidocaine (XYLOCAINE) 2 % topical gel   Yes No   Sig: Apply 1 application topically   nitroglycerin (NITROSTAT) 0 6 mg SL tablet   No Yes   Sig: Place 1 tablet (0 6 mg total) under the tongue every 5 (five) minutes as needed for chest pain   omeprazole (PriLOSEC) 20 mg delayed release capsule   Yes Yes   Sig: Take 20 mg by mouth daily      Facility-Administered Medications: None       Past Medical History:   Diagnosis Date    Asthma     Dysuria 2012    Esophageal spasm 2012    Resolved:  2017    GERD (gastroesophageal reflux disease)     Skin disorder 2013    Resolved:  2017       Past Surgical History:   Procedure Laterality Date     SECTION      WISDOM TOOTH EXTRACTION         Family History   Problem Relation Age of Onset    Diabetes Mother     No Known Problems Father     Thyroid cancer Son 25    Depression Daughter     Anxiety disorder Daughter     Skin cancer Sister 43    No Known Problems Maternal Grandmother     No Known Problems Maternal Grandfather     No Known Problems Paternal Grandmother     No Known Problems Paternal Grandfather     No Known Problems Maternal Aunt     No Known Problems Paternal Aunt     Alcohol abuse Neg Hx     Mental illness Neg Hx     Substance Abuse Neg Hx      I have reviewed and agree with the history as documented  E-Cigarette/Vaping    E-Cigarette Use Never User      E-Cigarette/Vaping Substances    Nicotine No     THC No     CBD No     Flavoring No     Other No     Unknown No      Social History     Tobacco Use    Smoking status: Never Smoker    Smokeless tobacco: Never Used   Substance Use Topics    Alcohol use: Yes     Comment: Socially    Drug use: No       Review of Systems   Constitutional: Negative for chills and fever  HENT: Negative for congestion, rhinorrhea and sore throat  Eyes: Negative for pain and visual disturbance  Respiratory: Negative for cough, shortness of breath and wheezing  Cardiovascular: Negative for chest pain and palpitations  Gastrointestinal: Negative for abdominal pain, bowel incontinence, constipation, diarrhea and vomiting  Genitourinary: Positive for bladder incontinence  Negative for dysuria, frequency, pelvic pain and urgency  +urinary incontinence   Musculoskeletal: Positive for back pain  Negative for neck pain and neck stiffness  Skin: Negative for rash and wound  Neurological: Positive for numbness  Negative for dizziness, syncope, speech difficulty, weakness, light-headedness, headaches and paresthesias  Physical Exam  Physical Exam  Constitutional:       General: She is not in acute distress  Appearance: She is well-developed  She is not diaphoretic  HENT:      Head: Normocephalic and atraumatic  Right Ear: External ear normal       Left Ear: External ear normal    Eyes:      Conjunctiva/sclera: Conjunctivae normal       Pupils: Pupils are equal, round, and reactive to light  Neck:      Musculoskeletal: Normal range of motion and neck supple  Cardiovascular:      Rate and Rhythm: Normal rate and regular rhythm  Heart sounds: Normal heart sounds  No murmur  No friction rub  No gallop  Pulmonary:      Effort: Pulmonary effort is normal  No respiratory distress  Breath sounds: Normal breath sounds  No wheezing  Abdominal:      General: There is no distension  Palpations: Abdomen is soft  Tenderness: There is no abdominal tenderness  Genitourinary:     Comments: Performed with female RN as chaperone    Normal rectal tone by exam  Musculoskeletal:         General: Tenderness present  Comments: TTP to R lumbar paraspinal musculature and over gluteal region  No midline T/L or sacral TTP  +straight leg raise on R side  Lymphadenopathy:      Cervical: No cervical adenopathy  Skin:     General: Skin is warm and dry  Capillary Refill: Capillary refill takes less than 2 seconds  Findings: No erythema or rash  Neurological:      Mental Status: She is alert and oriented to person, place, and time  Motor: No abnormal muscle tone  Coordination: Coordination normal       Comments: 5/5 strength to b/l LE  Intact, symmetric sensation to b/l thighs, lower legs to dull and sharp sensation  Diminished sensation to sole of R foot and great toe to dull and sharp sensation as compared with left side  2-3+, symmetric patellar and achilles reflexes  Negative babinski's  Psychiatric:         Behavior: Behavior normal          Thought Content:  Thought content normal          Judgment: Judgment normal          Vital Signs  ED Triage Vitals [04/06/21 0900]   Temperature Pulse Respirations Blood Pressure SpO2   97 9 °F (36 6 °C) (!) 113 18 170/87 100 %      Temp Source Heart Rate Source Patient Position - Orthostatic VS BP Location FiO2 (%)   Oral Monitor Sitting Right arm --      Pain Score       3           Vitals:    04/06/21 0900 04/06/21 1035 04/06/21 1153 04/06/21 1356   BP: 170/87 144/71 137/63 111/55   Pulse: (!) 113 105 104 87   Patient Position - Orthostatic VS: Sitting Lying Lying Lying         Visual Acuity      ED Medications  Medications - No data to display    Diagnostic Studies  Results Reviewed     Procedure Component Value Units Date/Time    Basic metabolic panel [[id number]] Collected: 04/06/21 1008    Lab Status: Final result Specimen: Blood from Arm, Right Updated: 04/06/21 1038     Sodium 144 mmol/L      Potassium 3 9 mmol/L      Chloride 107 mmol/L      CO2 27 mmol/L      ANION GAP 10 mmol/L      BUN 12 mg/dL      Creatinine 0 87 mg/dL      Glucose 103 mg/dL      Calcium 9 3 mg/dL      eGFR 75 ml/min/1 73sq m     Narrative:      [place] guidelines for Chronic Kidney Disease (CKD):     Stage 1 with normal or high GFR (GFR > 90 mL/min/1 73 square meters)    Stage 2 Mild CKD (GFR = 60-89 mL/min/1 73 square meters)    Stage 3A Moderate CKD (GFR = 45-59 mL/min/1 73 square meters)    Stage 3B Moderate CKD (GFR = 30-44 mL/min/1 73 square meters)    Stage 4 Severe CKD (GFR = 15-29 mL/min/1 73 square meters)    Stage 5 End Stage CKD (GFR <15 mL/min/1 73 square meters)  Note: GFR calculation is accurate only with a steady state creatinine    CBC and differential [[id number]]  (Abnormal) Collected: 04/06/21 1009    Lab Status: Final result Specimen: Blood from Arm, Right Updated: 04/06/21 1019     WBC 3 64 Thousand/uL      RBC 4 37 Million/uL      Hemoglobin 13 6 g/dL      Hematocrit 40 4 %      MCV 92 fL      MCH 31 1 pg      MCHC 33 7 g/dL      RDW 12 0 %      MPV 10 4 fL      Platelets 397 Thousands/uL      nRBC 0 /100 WBCs      Neutrophils Relative 59 %      Immat GRANS % 0 %      Lymphocytes Relative 30 %      Monocytes Relative 7 %      Eosinophils Relative 3 %      Basophils Relative 1 %      Neutrophils Absolute 2 18 Thousands/µL      Immature Grans Absolute 0 01 Thousand/uL      Lymphocytes Absolute 1 08 Thousands/µL      Monocytes Absolute 0 24 Thousand/µL      Eosinophils Absolute 0 09 Thousand/µL      Basophils Absolute 0 04 Thousands/µL     Urine Macroscopic, POC [[id number]] Collected: 04/06/21 0918    Lab Status: Final result Specimen: Urine Updated: 04/06/21 0919     Color, UA Yellow     Clarity, UA Clear     pH, UA 5 5     Leukocytes, UA Negative     Nitrite, UA Negative     Protein, UA Negative mg/dl      Glucose, UA Negative mg/dl      Ketones, UA Negative mg/dl      Urobilinogen, UA 0 2 E U /dl      Bilirubin, UA Negative     Blood, UA Negative     Specific Gravity, UA <=1 005    Narrative:      [facility] RESULT                 MRI lumbar spine wo contrast   Final Result by Santos Herrera DO (04/06 1146)      Lumbar degenerative disc disease L2-3 through L5-S1 with annular bulging  Small disc herniations are present including left foraminal disc herniations at the L2-3 and L3-4 level and a moderate broad-based central disc herniation at L4-5  Severe canal    stenosis at the L4-5 level  Overall findings at L2-3 through L5-S1 appears stable compared to prior study  Workstation performed: [id number]                    Procedures  Procedures         ED Course  ED Course as of Apr 07 0807   Tue Apr 06, 2021   1313 Neurosurgery paged concerning MRI, history/exam findings  SBIRT 22yo+      Most Recent Value   SBIRT (24 yo +)   In order to provide better care to our patients, we are screening all of our patients for alcohol and drug use  Would it be okay to ask you these screening questions? No Filed at: 04/06/2021 4167                    MDM  Number of Diagnoses or Management Options  Low back pain with sciatica:   Lumbar stenosis:   Diagnosis management comments: R lower back pain, radiation into R lower extremity to level of foot which is chronic in nature but more persistent over past few months  Known history of lumbar stenosis, HNP by previous lumbar MRI   However, now with progressive neurologic symptoms including R groin/thigh numbness and urinary incontinence over past 1-2 weeks  Lower extremity neurologic examination nonfocal with exception of reportedly diminished sensation to sole of R foot/great toe compared with left side  Given progressive symptoms will obtain MRI lumbar spine, discuss with neurosurgery to exclude cauda equina/conus medullaris syndrome  Will check basic labs    ---------------------------  MRI reveals moderate broad based central disc herniation at L4-L5 as well as severe canal stensosi at L4-L5 level  However, stable from previous MRI  Case discussed with Ramila Abebe, on call neurosurgery who reviewed MRI, case details and recommends patient should be stable for prompt outpatient follow up with neurosurgery with strict return indications  Reports should have opening at neurosurgery this week  Patient informed of these recommendations and is agreeable  Importance of follow up discussed as well as strict return to ED indications, including not limited to worsening lower extremity numbness/weakness, new/worsening pain, urinary or fecal retention, fecal incontinence, or any new/worsening symptoms of concern  Amount and/or Complexity of Data Reviewed  Clinical lab tests: ordered and reviewed  Tests in the radiology section of CPT®: ordered and reviewed    Patient Progress  Patient progress: stable      Disposition  Final diagnoses:   Low back pain with sciatica   Lumbar stenosis     Time reflects when diagnosis was documented in both MDM as applicable and the Disposition within this note     Time User Action Codes Description Comment    4/6/2021  2:09 PM Bisi Whelan [M54 [id number]] Low back pain with sciatica     4/6/2021  2:09 PM Bisi Whelan [[id number]] Lumbar stenosis       ED Disposition     ED Disposition Condition Date/Time Comment    Discharge Stable Tue Apr 6, 2021  2:09 PM Russell Santos discharge to home/self care              Follow-up Information     Follow up With Specialties Details Why Contact Info Additional Information    [facility] Schedule an appointment as soon as possible for a visit  Call to confirm appointment for 4/9/21 at 10:00 am [address]  [facility] [id number]  [facility] [address], [address] [facility], [id number] [phone]          Discharge Medication List as of 4/6/2021  2:13 PM      CONTINUE these medications which have NOT CHANGED    Details   ALPRAZolam (XANAX) 0 25 mg tablet Take 1 tablet (0 25 mg total) by mouth daily at bedtime as needed for anxiety, Starting Tue 1/19/2021, Normal      budesonide-formoterol (SYMBICORT) 80-4 5 MCG/ACT inhaler Inhale 2 puffs 2 (two) times a day, Starting Thu 5/31/2018, Historical Med      cholecalciferol (VITAMIN D3) 1,000 units tablet Take 1,000 Units by mouth daily, Historical Med      cyclobenzaprine (FLEXERIL) 5 mg tablet Take 1 tablet (5 mg total) by mouth daily at bedtime as needed for muscle spasms, Starting Mon 2/22/2021, Normal      hyoscyamine (ANASPAZ,LEVSIN) 0 125 MG tablet Take 1 tablet (0 125 mg total) by mouth every 4 (four) hours as needed for cramping, Starting Thu 10/17/2019, Normal      levalbuterol (XOPENEX HFA) 45 mcg/act inhaler Inhale 2 puffs every 4 (four) hours as needed, Starting Mon 10/22/2018, Historical Med      nitroglycerin (NITROSTAT) 0 6 mg SL tablet Place 1 tablet (0 6 mg total) under the tongue every 5 (five) minutes as needed for chest pain, Starting Thu 9/27/2018, Normal      omeprazole (PriLOSEC) 20 mg delayed release capsule Take 20 mg by mouth daily, Historical Med      ergocalciferol (VITAMIN D2) 50,000 units Take 1 capsule (50,000 Units total) by mouth once a week, Starting Wed 1/8/2020, Normal      lidocaine (XYLOCAINE) 2 % topical gel Apply 1 application topically, Starting Wed 7/24/2019, Until Tue 3/23/2021, Historical Med           No discharge procedures on file      PDMP Review       Value Time User    PDMP Reviewed  Yes 1/19/2021  5:48 AM Darryl Landry DO          ED Provider  Electronically Signed by           Camilla Goodpasture, PA-C  04/07/21 0147

## 2021-04-09 ENCOUNTER — CONSULT (OUTPATIENT)
Dept: NEUROSURGERY | Facility: CLINIC | Age: 56
End: 2021-04-09
Payer: COMMERCIAL

## 2021-04-09 VITALS
DIASTOLIC BLOOD PRESSURE: 84 MMHG | HEART RATE: 113 BPM | HEIGHT: 64 IN | RESPIRATION RATE: 16 BRPM | TEMPERATURE: 97.6 F | SYSTOLIC BLOOD PRESSURE: 136 MMHG | WEIGHT: 138 LBS | BODY MASS INDEX: 23.56 KG/M2

## 2021-04-09 DIAGNOSIS — M54.50 LOWER BACK PAIN: ICD-10-CM

## 2021-04-09 DIAGNOSIS — M54.16 LUMBAR BACK PAIN WITH RADICULOPATHY AFFECTING RIGHT LOWER EXTREMITY: Primary | ICD-10-CM

## 2021-04-09 PROCEDURE — 99244 OFF/OP CNSLTJ NEW/EST MOD 40: CPT | Performed by: NEUROLOGICAL SURGERY

## 2021-04-09 NOTE — PROGRESS NOTES
Office Note - Neurosurgery   Terry Negrete 63 y o  female MRN: [id number]      Assessment:    Patient is stable  50-year-old woman with right lumbar radiculopathy secondary to L4-5 disc herniation  She has some intermittent urinary urgency and some exacerbation of her stress incontinence  She does describe some perineal numbness of her groin, but only intermittently and this seems to be related to increasing pain in the right leg  We discussed a right L4-5 minimally invasive microdiskectomy and possible epidural steroid injection  The goal of surgery is to relieve pressure neural structures and hopefully improve radicular pain and symptoms of neurogenic claudication  Weakness, numbness and back pain are less likely to improve  The risks of surgery were described in detail  1  Risk of general anesthetic with possible cardiac and respiratory complication  Risk of infection and bleeding  2  Risk of neurological injury with new pain, weakness or numbness in the legs or difficulties with bowel and bladder function  Risk of CSF leak was described  3  Possible need for additional lumbar spine surgery in the future  Expected postoperative course, including activity restrictions, expected pain and postoperative medication were reviewed  Patient provided verbal consent to surgical procedure and signed consent form: Yes,  but she would like to take some time to consider her options  and explore nonsurgical options first   I reiterated that should her right leg pain become worse or she develops saleem urinary incontinence, that she should contact this office or present to the emergency department immediately  We did review the signs and symptoms of cauda equina syndrome  I would be pleased to see her again should she have any additional questions  Otherwise, she will follow up on a p r n  basis  History, physical examination and diagnostic tests were reviewed and questions answered  Diagnosis, care plan and treatment options were discussed  The patient and spouse/SO understand instructions and will follow up as directed  Plan:    Follow-up: prn    Problem List Items Addressed This Visit        Nervous and Auditory    Lumbar back pain with radiculopathy affecting right lower extremity - Primary      Other Visit Diagnoses     Lower back pain              Subjective/Objective     Chief Complaint     right leg pain  HPI     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  In February she developed some lower back pain and pain radiating down the right leg into her calf  She also began to notice that when the leg pain was severe, she developed some numbness and tingling in her right groin  She has noticed some increase in her stress incontinence when standing and walking  She denies any nocturia  She describes some urinary hesitancy but feels if she can anterior bladder out reasonably well  She denies any fecal incontinence  She denies any pain, weakness or numbness in her left leg  Current pain medications are listed below  She is scheduled to start physical therapy, but was referred to the emergency department when she mention some of her urinary symptoms  She presents today for further evaluation  GABINO LLOYD personally reviewed and updated  Review of Systems   Constitutional: Negative  HENT: Negative  Eyes: Negative  Respiratory: Negative  Asthma   Cardiovascular: Negative  Gastrointestinal: Negative  GI spasms   Genitourinary:        Urinary incontinence, since January getting worse   Musculoskeletal: Positive for back pain (center LBP radiatng into right buttock down right back outer leg,   worse pain is at calf area  burning pain)  Abdominal sensation of nerve tingling started about 3 wks ago   Skin: Negative  Allergic/Immunologic: Negative  Neurological: Positive for weakness and numbness (right foot    Groin with severe hip pain)  Hematological: Negative  Psychiatric/Behavioral: Negative          Family History    Family History   Problem Relation Age of Onset    Diabetes Mother     No Known Problems Father     Thyroid cancer Son 25    Depression Daughter     Anxiety disorder Daughter     Skin cancer Sister 43    No Known Problems Maternal Grandmother     No Known Problems Maternal Grandfather     No Known Problems Paternal Grandmother     No Known Problems Paternal Grandfather     No Known Problems Maternal Aunt     No Known Problems Paternal Aunt     Alcohol abuse Neg Hx     Mental illness Neg Hx     Substance Abuse Neg Hx        Social History    Social History     Socioeconomic History    Marital status: /Civil Union     Spouse name: Not on file    Number of children: Not on file    Years of education: Not on file    Highest education level: Not on file   Occupational History    Not on file   Social Needs    Financial resource strain: Not on file    Food insecurity     Worry: Not on file     Inability: Not on file   [employer] needs     Medical: Not on file     Non-medical: Not on file   Tobacco Use    Smoking status: Never Smoker    Smokeless tobacco: Never Used   Substance and Sexual Activity    Alcohol use: Yes     Comment: Socially    Drug use: No    Sexual activity: Yes     Partners: Male   Lifestyle    Physical activity     Days per week: Not on file     Minutes per session: Not on file    Stress: Not on file   Relationships    Social connections     Talks on phone: Not on file     Gets together: Not on file     Attends Mandaen service: Not on file     Active member of club or organization: Not on file     Attends meetings of clubs or organizations: Not on file     Relationship status: Not on file    Intimate partner violence     Fear of current or ex partner: Not on file     Emotionally abused: Not on file     Physically abused: Not on file     Forced sexual activity: Not on file   Other Topics Concern    Not on file   Social History Narrative    Not on file       Past Medical History    Past Medical History:   Diagnosis Date    Asthma     Dysuria 2012    Esophageal spasm 2012    Resolved:  2017    GERD (gastroesophageal reflux disease)     Skin disorder 2013    Resolved:  2017       Surgical History    Past Surgical History:   Procedure Laterality Date     SECTION      WISDOM TOOTH EXTRACTION         Medications      Current Outpatient Medications:     ALPRAZolam (XANAX) 0 25 mg tablet, Take 1 tablet (0 25 mg total) by mouth daily at bedtime as needed for anxiety, Disp: 30 tablet, Rfl: 0    budesonide-formoterol (SYMBICORT) 80-4 5 MCG/ACT inhaler, Inhale 2 puffs 2 (two) times a day, Disp: , Rfl:     cholecalciferol (VITAMIN D3) 1,000 units tablet, Take 1,000 Units by mouth daily, Disp: , Rfl:     cyclobenzaprine (FLEXERIL) 5 mg tablet, Take 1 tablet (5 mg total) by mouth daily at bedtime as needed for muscle spasms, Disp: 30 tablet, Rfl: 0    hyoscyamine (ANASPAZ,LEVSIN) 0 125 MG tablet, Take 1 tablet (0 125 mg total) by mouth every 4 (four) hours as needed for cramping, Disp: 30 tablet, Rfl: 0    levalbuterol (XOPENEX HFA) 45 mcg/act inhaler, Inhale 2 puffs every 4 (four) hours as needed, Disp: , Rfl: 0    lidocaine (XYLOCAINE) 2 % topical gel, Apply 1 application topically, Disp: , Rfl:     omeprazole (PriLOSEC) 20 mg delayed release capsule, Take 20 mg by mouth daily, Disp: , Rfl:     ergocalciferol (VITAMIN D2) 50,000 units, Take 1 capsule (50,000 Units total) by mouth once a week (Patient not taking: Reported on 3/23/2021), Disp: 12 capsule, Rfl: 1    nitroglycerin (NITROSTAT) 0 6 mg SL tablet, Place 1 tablet (0 6 mg total) under the tongue every 5 (five) minutes as needed for chest pain, Disp: 90 tablet, Rfl: 1    Allergies    Allergies   Allergen Reactions    Azithromycin Swelling     Other reaction(s): swelling    Bactrim [Sulfamethoxazole-Trimethoprim] Swelling    Codeine Swelling    Sulfa Antibiotics Hives    Sulfasalazine Hives       The following portions of the patient's history were reviewed and updated as appropriate: allergies, current medications, past family history, past medical history, past social history, past surgical history and problem list     Investigations    I personally reviewed the MRI results with the patient:      MRI of the lumbar spine without contrast dated April 6th, 2021  Normal lumbar lordosis  Degenerative disc disease at L4-5 and L5-S1  At L4-5 there is a broad-based disc herniation producing moderate to severe central canal stenosis and left greater than right lateral recess stenosis  Noncompressive degenerative changes at L5-S1  Physical Exam    Vitals:  Blood pressure 136/84, pulse (!) 113, temperature 97 6 °F (36 4 °C), temperature source Tympanic, resp  rate 16, height 5' 3 5" (1 613 m), weight 62 6 kg (138 lb), not currently breastfeeding  ,Body mass index is 24 06 kg/m²  Physical Exam  Vitals signs reviewed  Constitutional:       General: She is not in acute distress  Eyes:      Extraocular Movements: Extraocular movements intact  Pulmonary:      Effort: Pulmonary effort is normal  No respiratory distress  Musculoskeletal:         General: No deformity  Comments: Full range of motion on flexion-extension lumbar spine without significant pain  Skin:     General: Skin is warm and dry  Neurological:      Mental Status: She is alert and oriented to person, place, and time  Comments: 5/5 power in lower extremities  Straight leg raise positive on the right  Reports normal light touch sensation lower extremities  Walks with a steady gait  Psychiatric:         Mood and Affect: Mood normal          Behavior: Behavior normal        Neurologic Exam     Mental Status   Oriented to person, place, and time

## 2021-04-13 ENCOUNTER — OFFICE VISIT (OUTPATIENT)
Dept: FAMILY MEDICINE CLINIC | Facility: CLINIC | Age: 56
End: 2021-04-13
Payer: COMMERCIAL

## 2021-04-13 VITALS
HEIGHT: 64 IN | WEIGHT: 135.6 LBS | OXYGEN SATURATION: 97 % | BODY MASS INDEX: 23.15 KG/M2 | DIASTOLIC BLOOD PRESSURE: 64 MMHG | SYSTOLIC BLOOD PRESSURE: 124 MMHG | RESPIRATION RATE: 18 BRPM | HEART RATE: 115 BPM | TEMPERATURE: 97.6 F

## 2021-04-13 DIAGNOSIS — E55.9 VITAMIN D DEFICIENCY: ICD-10-CM

## 2021-04-13 DIAGNOSIS — J45.20 MILD INTERMITTENT ASTHMA WITHOUT COMPLICATION: ICD-10-CM

## 2021-04-13 DIAGNOSIS — M54.16 LUMBAR BACK PAIN WITH RADICULOPATHY AFFECTING RIGHT LOWER EXTREMITY: ICD-10-CM

## 2021-04-13 DIAGNOSIS — Z01.818 PRE-OP EXAM: Primary | ICD-10-CM

## 2021-04-13 PROCEDURE — 93000 ELECTROCARDIOGRAM COMPLETE: CPT | Performed by: FAMILY MEDICINE

## 2021-04-13 PROCEDURE — 1036F TOBACCO NON-USER: CPT | Performed by: FAMILY MEDICINE

## 2021-04-13 PROCEDURE — 3725F SCREEN DEPRESSION PERFORMED: CPT | Performed by: FAMILY MEDICINE

## 2021-04-13 PROCEDURE — 3008F BODY MASS INDEX DOCD: CPT | Performed by: FAMILY MEDICINE

## 2021-04-13 PROCEDURE — 99243 OFF/OP CNSLTJ NEW/EST LOW 30: CPT | Performed by: FAMILY MEDICINE

## 2021-04-13 RX ORDER — ERGOCALCIFEROL (VITAMIN D2) 200 MCG/ML
DROPS ORAL
Qty: 60 ML | Refills: 1 | Status: SHIPPED | OUTPATIENT
Start: 2021-04-13 | End: 2022-04-19

## 2021-04-13 NOTE — PROGRESS NOTES
Assessment/Plan:    Patient is a 62-year-old female seen for pre-op physical exam   She has a history of chronic lumbar disc disease  Recently she developed difficulty with urination - urgency and not completely emptying her bladder  She had numbness in a gentle  Her patient was seen in the emergency room and a repeat MRI was done which showed a central disc herniation at L4-L5 with moderately severe canal stenosis  She also had bulging with a small disc protrusion at L5-S1  At L3-L4 she had disc protrusion with moderate narrowing with disc material abutting the ventral aspect of the dorsal root ganglia  Patient was seen by Dr Bienvenido Perera at the [facility] and is scheduled for an L4-5 diskectomy, laminectomy  EKG done in our office was normal sinus rhythm at 92 beats per minute  Patient is seen in ur office for hyperlipidemia and mild asthma  She also has low Vit D  She has not had PAT blood work yet  She is medically clear for surgery pending her blood work results  4/19/2021 - Addendum - I was under the impression that patient was to have blood work after our visit  I did review her blood work from 4/6 and based on those values - she is clear for surgery  We also discussed her Vit d supplement  Diagnoses and all orders for this visit:    Pre-op exam  -     POCT ECG    Lumbar back pain with radiculopathy affecting right lower extremity    Vitamin D deficiency  -     ergocalciferol (DRISDOL) 200 mcg/mL drops; Take one tsp ( 5 ml) weekly          Subjective:   Chief Complaint   Patient presents with    Pre-op Exam     Discectomy and laminectoy 4/21/2021        Patient ID: Remy De La Fuente is a 63 y o  female  Patient has history of chronic back problems  Went to physical therapy last week and told therapist that she had issues with urination and numbness in genital area    Herniated disc at L4-5  Dr Greg Betancourt      The following portions of the patient's history were reviewed and updated as appropriate: allergies, current medications, past family history, past medical history, past social history, past surgical history and problem list     Allergies   Allergen Reactions    Azithromycin Swelling     Other reaction(s): swelling    Bactrim [Sulfamethoxazole-Trimethoprim] Swelling    Codeine Swelling    Sulfa Antibiotics Hives    Sulfasalazine Hives     Patient was never a smoker  Admits to drinking alcohol socially  Past Surgical History:   Procedure Laterality Date     SECTION      WISDOM TOOTH EXTRACTION       Review of Systems   Constitutional: Negative for chills and fever  HENT: Negative for ear pain and sore throat  Respiratory: Negative for cough, chest tightness and shortness of breath  Cardiovascular: Negative for chest pain, palpitations and leg swelling  Gastrointestinal:        History of IBS - alternating diarrhea and constipation  Genitourinary: Positive for frequency  Musculoskeletal: Positive for arthralgias and back pain  Skin: Negative  Neurological: Positive for numbness (right groin  right 2nd to 5th toes)  Negative for dizziness, tremors, weakness and headaches  Hematological: Does not bruise/bleed easily  Psychiatric/Behavioral: Positive for sleep disturbance  The patient is not nervous/anxious  Objective:      /64 (BP Location: Left arm, Patient Position: Sitting)   Pulse (!) 115   Temp 97 6 °F (36 4 °C) (Tympanic)   Resp 18   Ht 5' 4" (1 626 m)   Wt 61 5 kg (135 lb 9 6 oz)   LMP  (LMP Unknown)   SpO2 97%   BMI 23 28 kg/m²          Physical Exam  Vitals signs and nursing note reviewed  Constitutional:       General: She is not in acute distress  HENT:      Head: Normocephalic  Right Ear: Tympanic membrane normal       Left Ear: Tympanic membrane normal    Neck:      Thyroid: No thyromegaly  Vascular: No carotid bruit  Cardiovascular:      Rate and Rhythm: Normal rate and regular rhythm        Heart sounds: Normal heart sounds  Pulmonary:      Effort: Pulmonary effort is normal       Breath sounds: Normal breath sounds  Musculoskeletal:      Right lower leg: No edema  Left lower leg: No edema  Comments: Decreased range of motion of cervical spine  Lymphadenopathy:      Cervical: No cervical adenopathy  Skin:     General: Skin is warm and dry  Neurological:      Mental Status: She is alert and oriented to person, place, and time  Sensory: Sensory deficit present  Motor: Weakness (right leg compared to left ) present     Psychiatric:         Mood and Affect: Mood normal

## 2021-04-16 ENCOUNTER — IMMUNIZATIONS (OUTPATIENT)
Dept: FAMILY MEDICINE CLINIC | Facility: HOSPITAL | Age: 56
End: 2021-04-16

## 2021-04-16 DIAGNOSIS — Z23 ENCOUNTER FOR IMMUNIZATION: Primary | ICD-10-CM

## 2021-04-16 PROCEDURE — 91300 SARS-COV-2 / COVID-19 MRNA VACCINE (PFIZER-BIONTECH) 30 MCG: CPT

## 2021-04-16 PROCEDURE — 0002A SARS-COV-2 / COVID-19 MRNA VACCINE (PFIZER-BIONTECH) 30 MCG: CPT

## 2021-04-19 ENCOUNTER — TELEPHONE (OUTPATIENT)
Dept: FAMILY MEDICINE CLINIC | Facility: CLINIC | Age: 56
End: 2021-04-19

## 2021-04-19 NOTE — TELEPHONE ENCOUNTER
Clem Garcia from [address] [place] [address] called asking about patients clearance for surgery  Is she cleared now that you received her blood work  Please fax [phone]

## 2021-04-19 NOTE — TELEPHONE ENCOUNTER
I put an addendum in that she is clear for surgery based on lab values from 04/06  I do not see that she has had any blood work since I saw her last week

## 2021-06-11 ENCOUNTER — TELEPHONE (OUTPATIENT)
Dept: FAMILY MEDICINE CLINIC | Facility: CLINIC | Age: 56
End: 2021-06-11

## 2021-06-11 NOTE — TELEPHONE ENCOUNTER
Pt called stating that her surgeon from her spine surgery stated that she needs to go to PT  Pt needs referral placed for this  Please advise

## 2021-06-23 ENCOUNTER — EVALUATION (OUTPATIENT)
Dept: PHYSICAL THERAPY | Facility: CLINIC | Age: 56
End: 2021-06-23
Payer: COMMERCIAL

## 2021-06-23 DIAGNOSIS — M51.26 OTHER INTERVERTEBRAL DISC DISPLACEMENT, LUMBAR REGION: ICD-10-CM

## 2021-06-23 DIAGNOSIS — M48.062 SPINAL STENOSIS, LUMBAR REGION WITH NEUROGENIC CLAUDICATION: Primary | ICD-10-CM

## 2021-06-23 PROCEDURE — 97161 PT EVAL LOW COMPLEX 20 MIN: CPT

## 2021-06-23 PROCEDURE — 97110 THERAPEUTIC EXERCISES: CPT

## 2021-06-23 NOTE — LETTER
2021    Maday Fernandez MD  [id number] [id number]    Patient: Colleen Martin   YOB: 1965   Date of Visit: 2021     Encounter Diagnosis     ICD-10-CM    1  Spinal stenosis, lumbar region with neurogenic claudication  M48 062    2  Other intervertebral disc displacement, lumbar region  M51 26        Dear Dr Eliud Grant:    Thank you for your recent referral of Colleen Martin  Please review the attached evaluation summary from Yuridia's recent visit  Please verify that you agree with the plan of care by signing the attached order  If you have any questions or concerns, please do not hesitate to call  I sincerely appreciate the opportunity to share in the care of one of your patients and hope to have another opportunity to work with you in the near future  Sincerely,    Michael Huddleston, PT      Referring Provider:      I certify that I have read the below Plan of Care and certify the need for these services furnished under this plan of treatment while under my care  Maday Fernandez MD  [address] [address]  Via Fax: [phone]          PT Evaluation     Today's date: 2021  Patient name: Colleen Martin  : 1965  MRN: [id number]  Referring provider: Jimi Mortensen MD  Dx:   Encounter Diagnosis     ICD-10-CM    1  Spinal stenosis, lumbar region with neurogenic claudication  M48 062    2  Other intervertebral disc displacement, lumbar region  M51 26                   Assessment  Assessment details: Patient presents 8 weeks post op L4 - L5 LAMINECTOMY, DISCECTOMY, BILATERAL FORAMINOTOMY on 2021  She feels overall progress since surgery  No further referral appears necessary at this time based upon examination results  Colleen Martin has the impairments listed below resulting in functional impairment and are having a negative impact on her quality of life       Patient is appropriate and would benefit from skilled PT intervention to have an optimal return to function and achieve patient specific goals  No Red Flags  All of the patient's questions were answered to their satisfaction and the patient agreed to the plan of care  Patient's greatest impairments are:  1 ) deficits of mobility of Lumbar spine - to be addressed with joint mobilizations and self and therapist stretching   2 ) deficits in soft tissue mobility of Lumbar paraspinals - to be addressed with self and therapist stretching and soft tissue mobilizations  3 ) motor coordination deficits of core musculature - to be addressed with neuromuscular reeducation, progressive resistance exercises,  functional activities  4 ) deficits of motor coordination of B/L hip musculature - to be addressed with neuromuscular reeducation, progressive resistance exercises,  functional activities  Primary movement impairment diagnosis of deep core motor coordination deficits resulting in pathoanatomical symptoms of The primary encounter diagnosis was Spinal stenosis, lumbar region with neurogenic claudication  A diagnosis of Other intervertebral disc displacement, lumbar region was also pertinent to this visit  and limiting her ability to sit, walking, recreation  Impairments: abnormal coordination, abnormal muscle firing, abnormal or restricted ROM, activity intolerance, impaired physical strength, lacks appropriate home exercise program, pain with function, poor posture  and poor body mechanics    Symptom irritability: moderatePrognosis details: Patient has a history of anxiety and depression which can lower her prognosis  Goals  STG (3-4 weeks):  Patient will successfully transition to [facility]  Patient will be able to manage symptoms independently  Patient will be able to perform proper TA contraction  Patient will report no limitations in ADL's  Patient will improve deep core contraction score to 5 - 8/10        LTG (6-8weeks):  Patient will improve deep core contraction score to 8 - 10/10  Patient will be able to demonstrate proper activation of core musculature during dynamic activities  Patient will report no limitations in work related activities  Patient will report no problems in recreational activities  Patient will demonstrate proper lifting mechanics  Patient will improve FOTO score to projected value by the end of treatment  Plan  Patient would benefit from: skilled physical therapy  Referral necessary: No  Planned modality interventions: cryotherapy and thermotherapy: hydrocollator packs  Other planned modality interventions: as prn  Planned therapy interventions: abdominal trunk stabilization, body mechanics training, functional ROM exercises, flexibility, home exercise program, therapeutic exercise, therapeutic activities, stretching, strengthening, patient education, postural training, neuromuscular re-education, manual therapy and joint mobilization  Frequency: 2x week  Duration in weeks: 8  Treatment plan discussed with: patient        Subjective Evaluation    History of Present Illness  Date of surgery: 4/21/2021  Mechanism of injury: surgery  Mechanism of injury: Colleen Martin is a 63 y o  female who came to outpatient PT on 6/23/2021  Patient presents 8 weeks post op L4 - L5 LAMINECTOMY, DISCECTOMY, BILATERAL FORAMINOTOMY on 4/21/2021  Overall she has had less pain week by week  Patient stopped wearing brace a week and a half ago  Patient denies any radicular pain, N/T  The patient's greatest concerns are  the pain she is experiencing, worry over not knowing what's wrong, concern at no signs of improvement and fear of not being able to keep active  Patient is currently taking nothing for pain  Patient work:     Denied any [address]       Patient finds aggravating factors are:  1 ) bending  2 ) carrying heavy things  3 ) getting in and out of car  4 ) sitting  5 ) repetitive movements    Patients finds most relieving factors are:  1 ) rest    No further referral appears necessary at this time based upon examination results  Patient's goals for therapy: decrease pain, increase motion, increase strength  Patient's goals: decrease pain,   Pain  Current pain ratin  At best pain ratin  At worst pain ratin  Location: "on either side of my incisions"          Objective     Concurrent Complaints  Negative for bladder dysfunction, bowel dysfunction and saddle (S4) numbness    Palpation   Left   Hypertonic in the lumbar paraspinals  Tenderness of the lumbar paraspinals  Right   Hypertonic in the lumbar paraspinals  Tenderness of the lumbar paraspinals       Neurological Testing     Sensation     Lumbar   Left   Intact: light touch    Right   Intact: light touch    Strength/Myotome Testing     Lumbar   Left   Heel walk: normal  Toe walk: normal    Right   Heel walk: normal  Toe walk: normal    Left Hip   Planes of Motion   Flexion: 4-    Right Hip   Planes of Motion   Flexion: 4-    Left Knee   Flexion: 4-  Extension: 4-    Right Knee   Flexion: 4  Extension: 4    Left Ankle/Foot   Dorsiflexion: 5  Great toe extension: 4    Right Ankle/Foot   Dorsiflexion: 5  Great toe extension: 4    Additional Strength Details  Core strength:    Deep Core Muscle Contraction Scale Score: 4/10    Deep Muscle Contraction Scale :   Quality of contraction: 2  No contraction : 0  Rapid, superficial contraction: 1  Just perceptible contraction: 2  Gentle, slow contraction: 3    Substitution: 1  Resting substitution:0  Moderate to strong substitution:1  Subtle perceptible substitution:2  No substitution:3      Symmetry: 1  Unilateral contraction:0  Bilateral but asymmetrical contraction:1  Symmetrical contraction:2     Breathin  Inability or difficulty with breathing during contraction: 0  Able to hold contraction while maintaining breathin    Holdin  Holding < 10 seconds:  0  Holding > 10 seconds: 1      Tests Lumbar     Left   Negative crossed SLR  Right   Negative crossed SLR  Left Hip   Negative EKTA and FADIR  Right Hip   Negative EKTA and FADIR       Additional Tests Details  HS 90-90:    Left: 15  Right: 20             Precautions: L4 - L5 LAMINECTOMY, DISCECTOMY, BILATERAL FORAMINOTOMY on 4/21/2021      Manuals 6/23            HS Stretch             HF Stretch                                       Neuro Re-Ed             TA in supine             TA march             TA heel slide             TA ball squeeze             TA hip abduction                                       Ther Ex             Bike             Treadmill             HS Stretch             HF Stretch             Clamshells             Stand Hip Abd                                                    HEP and Education KD            Ther Activity             Lifting Mechanics                                       Gait Training                                                    Modalities

## 2021-06-23 NOTE — PROGRESS NOTES
PT Evaluation     Today's date: 2021  Patient name: Akilah Castillo  : 1965  MRN: [id number]  Referring provider: Kartik Galarza MD  Dx:   Encounter Diagnosis     ICD-10-CM    1  Spinal stenosis, lumbar region with neurogenic claudication  M48 062    2  Other intervertebral disc displacement, lumbar region  M51 26                   Assessment  Assessment details: Patient presents 8 weeks post op L4 - L5 LAMINECTOMY, DISCECTOMY, BILATERAL FORAMINOTOMY on 2021  She feels overall progress since surgery  No further referral appears necessary at this time based upon examination results  Akilah Castillo has the impairments listed below resulting in functional impairment and are having a negative impact on her quality of life  Patient is appropriate and would benefit from skilled PT intervention to have an optimal return to function and achieve patient specific goals  No Red Flags  All of the patient's questions were answered to their satisfaction and the patient agreed to the plan of care  Patient's greatest impairments are:  1 ) deficits of mobility of Lumbar spine - to be addressed with joint mobilizations and self and therapist stretching   2 ) deficits in soft tissue mobility of Lumbar paraspinals - to be addressed with self and therapist stretching and soft tissue mobilizations  3 ) motor coordination deficits of core musculature - to be addressed with neuromuscular reeducation, progressive resistance exercises,  functional activities  4 ) deficits of motor coordination of B/L hip musculature - to be addressed with neuromuscular reeducation, progressive resistance exercises,  functional activities  Primary movement impairment diagnosis of deep core motor coordination deficits resulting in pathoanatomical symptoms of The primary encounter diagnosis was Spinal stenosis, lumbar region with neurogenic claudication   A diagnosis of Other intervertebral disc displacement, lumbar region was also pertinent to this visit  and limiting her ability to sit, walking, recreation  Impairments: abnormal coordination, abnormal muscle firing, abnormal or restricted ROM, activity intolerance, impaired physical strength, lacks appropriate home exercise program, pain with function, poor posture  and poor body mechanics    Symptom irritability: moderatePrognosis details: Patient has a history of anxiety and depression which can lower her prognosis  Goals  STG (3-4 weeks):  Patient will successfully transition to HEP  Patient will be able to manage symptoms independently  Patient will be able to perform proper TA contraction  Patient will report no limitations in ADL's  Patient will improve deep core contraction score to 5 - 8/10  LTG (6-8weeks):  Patient will improve deep core contraction score to 8 - 10/10  Patient will be able to demonstrate proper activation of core musculature during dynamic activities  Patient will report no limitations in work related activities  Patient will report no problems in recreational activities  Patient will demonstrate proper lifting mechanics  Patient will improve FOTO score to projected value by the end of treatment             Plan  Patient would benefit from: skilled physical therapy  Referral necessary: No  Planned modality interventions: cryotherapy and thermotherapy: hydrocollator packs  Other planned modality interventions: as prn  Planned therapy interventions: abdominal trunk stabilization, body mechanics training, functional ROM exercises, flexibility, home exercise program, therapeutic exercise, therapeutic activities, stretching, strengthening, patient education, postural training, neuromuscular re-education, manual therapy and joint mobilization  Frequency: 2x week  Duration in weeks: 8  Treatment plan discussed with: patient        Subjective Evaluation    History of Present Illness  Date of surgery: 4/21/2021  Mechanism of injury: surgery  Mechanism of injury: Dilma Dias is a 63 y o  female who came to outpatient PT on 2021  Patient presents 8 weeks post op L4 - L5 LAMINECTOMY, DISCECTOMY, BILATERAL FORAMINOTOMY on 2021  Overall she has had less pain week by week  Patient stopped wearing brace a week and a half ago  Patient denies any radicular pain, N/T  The patient's greatest concerns are  the pain she is experiencing, worry over not knowing what's wrong, concern at no signs of improvement and fear of not being able to keep active  Patient is currently taking nothing for pain  Patient work:     Denied any [address]  Patient finds aggravating factors are:  1 ) bending  2 ) carrying heavy things  3 ) getting in and out of car  4 ) sitting  5 ) repetitive movements    Patients finds most relieving factors are:  1 ) rest    No further referral appears necessary at this time based upon examination results  Patient's goals for therapy: decrease pain, increase motion, increase strength  Patient's goals: decrease pain,   Pain  Current pain ratin  At best pain ratin  At worst pain ratin  Location: "on either side of my incisions"          Objective     Concurrent Complaints  Negative for bladder dysfunction, bowel dysfunction and saddle (S4) numbness    Palpation   Left   Hypertonic in the lumbar paraspinals  Tenderness of the lumbar paraspinals  Right   Hypertonic in the lumbar paraspinals  Tenderness of the lumbar paraspinals       Neurological Testing     Sensation     Lumbar   Left   Intact: light touch    Right   Intact: light touch    Strength/Myotome Testing     Lumbar   Left   Heel walk: normal  Toe walk: normal    Right   Heel walk: normal  Toe walk: normal    Left Hip   Planes of Motion   Flexion: 4-    Right Hip   Planes of Motion   Flexion: 4-    Left Knee   Flexion: 4-  Extension: 4-    Right Knee   Flexion: 4  Extension: 4    Left Ankle/Foot   Dorsiflexion: 5  Great toe extension: 4    Right Ankle/Foot   Dorsiflexion: 5  Great toe extension: 4    Additional Strength Details  Core strength:    Deep Core Muscle Contraction Scale Score: 4/10    Deep Muscle Contraction Scale :   Quality of contraction: 2  No contraction : 0  Rapid, superficial contraction: 1  Just perceptible contraction: 2  Gentle, slow contraction: 3    Substitution: 1  Resting substitution:0  Moderate to strong substitution:1  Subtle perceptible substitution:2  No substitution:3      Symmetry: 1  Unilateral contraction:0  Bilateral but asymmetrical contraction:1  Symmetrical contraction:2     Breathin  Inability or difficulty with breathing during contraction: 0  Able to hold contraction while maintaining breathin    Holdin  Holding < 10 seconds:  0  Holding > 10 seconds: 1      Tests     Lumbar     Left   Negative crossed SLR  Right   Negative crossed SLR  Left Hip   Negative EKTA and FADIR  Right Hip   Negative EKTA and FADIR       Additional Tests Details  HS 90-90:    Left: 15  Right: 20             Precautions: L4 - L5 LAMINECTOMY, DISCECTOMY, BILATERAL FORAMINOTOMY on 2021      Manuals             HS Stretch             HF Stretch                                       Neuro Re-Ed             TA in supine             TA march             TA heel slide             TA ball squeeze             TA hip abduction                                       Ther Ex             Bike             Treadmill             HS Stretch             HF Stretch             Clamshells             Stand Hip Abd                                                    HEP and Education KD            Ther Activity             Lifting Mechanics                                       Gait Training                                                    Modalities

## 2021-06-29 ENCOUNTER — OFFICE VISIT (OUTPATIENT)
Dept: PHYSICAL THERAPY | Facility: CLINIC | Age: 56
End: 2021-06-29
Payer: COMMERCIAL

## 2021-06-29 DIAGNOSIS — M51.26 OTHER INTERVERTEBRAL DISC DISPLACEMENT, LUMBAR REGION: ICD-10-CM

## 2021-06-29 DIAGNOSIS — M48.062 SPINAL STENOSIS, LUMBAR REGION WITH NEUROGENIC CLAUDICATION: Primary | ICD-10-CM

## 2021-06-29 PROCEDURE — 97112 NEUROMUSCULAR REEDUCATION: CPT

## 2021-06-29 PROCEDURE — 97140 MANUAL THERAPY 1/> REGIONS: CPT

## 2021-06-29 PROCEDURE — 97110 THERAPEUTIC EXERCISES: CPT

## 2021-06-29 NOTE — PROGRESS NOTES
Daily Note     Today's date: 2021  Patient name: Judie Enrique  : 1965  MRN: [id number]  Referring provider: Odette Quintana MD  Dx:   Encounter Diagnosis     ICD-10-CM    1  Spinal stenosis, lumbar region with neurogenic claudication  M48 062    2  Other intervertebral disc displacement, lumbar region  M51 26                   Subjective: Patient reports she is a little stiff this AM  She also notes that she was walking on the beach and carrying things this past weekend down the shore which made her sore after  Objective: See treatment diary below  Assessment: Focused on improving flexibility of LEs to reduce stress of of spine  R sciatic nerve tension noted which was addressed with neurodynamics  She had difficulty with deep core contraction with dynamic movements  Plan: Continue per plan of care  Progress treatment as tolerated  Precautions: L4 - L5 LAMINECTOMY, DISCECTOMY, BILATERAL FORAMINOTOMY on 2021      Manuals            HS Stretch             HF Stretch             STM to Lumbar parspinals  KD                        Neuro Re-Ed             TA in supine  5" hold  1 min    10" hold  1 min           TA march  1 min x2           TA heel slide  1 min ea  TA ball squeeze  1 min ea  TA hip abduction                                       Ther Ex             Bike  5 min           Treadmill             Ankle Pump Sciatic Nerve Glide  10x ea             HS Stretch  3x30"           HF Stretch  3x30"           Clamshells  Pink  3x10           Stand Hip Abd  NV                                                  HEP and Education KD            Ther Activity             Lifting Mechanics                                       Gait Training                                                    Modalities

## 2021-07-07 ENCOUNTER — OFFICE VISIT (OUTPATIENT)
Dept: PHYSICAL THERAPY | Facility: CLINIC | Age: 56
End: 2021-07-07
Payer: COMMERCIAL

## 2021-07-07 DIAGNOSIS — M48.062 SPINAL STENOSIS, LUMBAR REGION WITH NEUROGENIC CLAUDICATION: Primary | ICD-10-CM

## 2021-07-07 DIAGNOSIS — M51.26 OTHER INTERVERTEBRAL DISC DISPLACEMENT, LUMBAR REGION: ICD-10-CM

## 2021-07-07 PROCEDURE — 97112 NEUROMUSCULAR REEDUCATION: CPT

## 2021-07-07 PROCEDURE — 97140 MANUAL THERAPY 1/> REGIONS: CPT

## 2021-07-07 PROCEDURE — 97110 THERAPEUTIC EXERCISES: CPT

## 2021-07-07 NOTE — PROGRESS NOTES
Daily Note     Today's date: 2021  Patient name: Jacy Kwan  : 1965  MRN: [id number]  Referring provider: Anup Garcia MD  Dx:   Encounter Diagnosis     ICD-10-CM    1  Spinal stenosis, lumbar region with neurogenic claudication  M48 062    2  Other intervertebral disc displacement, lumbar region  M51 26                   Subjective: Patient denies pain, but gets stiffness at LB  She feels child's pose helps relieve this  Objective: See treatment diary below  Assessment: Patient felt that child's pose helps to reduce tightness which we discussed benefit vs risk of this  Explained goal of PT at this time is to strengthening core not to promote Lumbar flexibility  She demonstrated understanding  Patient continues to be appropriately challenged with core strengthening at this time  Plan: Continue per plan of care  Progress treatment as tolerated  Precautions: L4 - L5 LAMINECTOMY, DISCECTOMY, BILATERAL FORAMINOTOMY on 2021      Manuals           HS Stretch             HF Stretch             STM to Lumbar parspinals  KD KD                       Neuro Re-Ed             TA in supine  5" hold  1 min    10" hold  1 min 10"  10x          TA march  1 min x2           TA heel slide  1 min ea  TA ball squeeze  1 min ea  3"  10x     With bridge  2x10          TA with SLR   10x ea          Stand TA with Shoulder Ext   GTB  2x10                       Ther Ex             Bike  5 min 5 min          Child's Pose   assessed and reviewed          Seated Lumbar Flexion   forward  reviewed          Treadmill             Ankle Pump Sciatic Nerve Glide  10x ea  10x ea  HS Stretch  3x30" reviewed          HF Stretch  3x30" reviewed          Clamshells  Pink  3x10 Pink  3x12          Stand Hip Abd  NV 2x10 ea                                                   HEP and Education KD  KD          Ther Activity             Lifting Mechanics Gait Training                                                    Modalities

## 2021-07-09 ENCOUNTER — APPOINTMENT (OUTPATIENT)
Dept: PHYSICAL THERAPY | Facility: CLINIC | Age: 56
End: 2021-07-09
Payer: COMMERCIAL

## 2021-07-19 ENCOUNTER — OFFICE VISIT (OUTPATIENT)
Dept: PHYSICAL THERAPY | Facility: CLINIC | Age: 56
End: 2021-07-19
Payer: COMMERCIAL

## 2021-07-19 DIAGNOSIS — M48.062 SPINAL STENOSIS, LUMBAR REGION WITH NEUROGENIC CLAUDICATION: Primary | ICD-10-CM

## 2021-07-19 DIAGNOSIS — M51.26 OTHER INTERVERTEBRAL DISC DISPLACEMENT, LUMBAR REGION: ICD-10-CM

## 2021-07-19 PROCEDURE — 97112 NEUROMUSCULAR REEDUCATION: CPT

## 2021-07-19 PROCEDURE — 97110 THERAPEUTIC EXERCISES: CPT

## 2021-07-19 PROCEDURE — 97140 MANUAL THERAPY 1/> REGIONS: CPT

## 2021-07-19 NOTE — PROGRESS NOTES
Daily Note     Today's date: 2021  Patient name: Elaine Villavicencio  : 1965  MRN: [id number]  Referring provider: Ines Pena MD  Dx:   Encounter Diagnosis     ICD-10-CM    1  Spinal stenosis, lumbar region with neurogenic claudication  M48 062    2  Other intervertebral disc displacement, lumbar region  M51 26                   Subjective: Patient reports she felt better on vacation when she was able to move around vs sitting at work  She was able to walk throughout the weekend, and even on sand, without pain  She was able to garden, but was sore during  She reports sitting for prolonged periods of time is her most difficult thing  Objective: See treatment diary below  Assessment: Educated patient on positioning options for gardening, to not hold sustained postures, and to take breaks  Patient remains appropriately challenged with current core stability  Plan: Continue per plan of care  Progress treatment as tolerated  Precautions: L4 - L5 LAMINECTOMY, DISCECTOMY, BILATERAL FORAMINOTOMY on 2021      Manuals          HS Stretch             HF Stretch             STM to Lumbar parspinals  KD KD KD                      Neuro Re-Ed             TA in supine  5" hold  1 min    10" hold  1 min 10"  10x 10"  10x         TA march  1 min x2           TA heel slide  1 min ea  TA ball squeeze  1 min ea  3"  10x     With bridge  2x10 With bridge  3x10         TA with SLR   10x ea 2x10 ea  Stand TA with Shoulder Ext   GTB  2x10 GTB  2x10          Seated Multifidus Press    GTB  2x10 ea  Ther Ex             Bike  5 min 5 min 5 min         Child's Pose   assessed and reviewed          Seated Lumbar Flexion   forward  reviewed          Treadmill             Ankle Pump Sciatic Nerve Glide  10x ea  10x ea  10x ea            HS Stretch  3x30" reviewed          HF Stretch  3x30" reviewed          Fede Pink  3x10 Pink  3x12 Pink  3x15         Stand Hip Abd  NV 2x10 ea  OTB  2x10 ea                                                   HEP and Education KD  KD KD         Ther Activity             Lifting Mechanics                                       Gait Training                                                    Modalities

## 2021-07-26 ENCOUNTER — APPOINTMENT (OUTPATIENT)
Dept: PHYSICAL THERAPY | Facility: CLINIC | Age: 56
End: 2021-07-26
Payer: COMMERCIAL

## 2021-08-03 ENCOUNTER — OFFICE VISIT (OUTPATIENT)
Dept: PHYSICAL THERAPY | Facility: CLINIC | Age: 56
End: 2021-08-03
Payer: COMMERCIAL

## 2021-08-03 DIAGNOSIS — M51.26 OTHER INTERVERTEBRAL DISC DISPLACEMENT, LUMBAR REGION: ICD-10-CM

## 2021-08-03 DIAGNOSIS — M48.062 SPINAL STENOSIS, LUMBAR REGION WITH NEUROGENIC CLAUDICATION: Primary | ICD-10-CM

## 2021-08-03 PROCEDURE — 97112 NEUROMUSCULAR REEDUCATION: CPT

## 2021-08-03 PROCEDURE — 97110 THERAPEUTIC EXERCISES: CPT

## 2021-08-03 NOTE — PROGRESS NOTES
Daily Note     Today's date: 8/3/2021  Patient name: Leah Madera  : 1965  MRN: [id number]  Referring provider: Manuelito Friedman MD  Dx:   Encounter Diagnosis     ICD-10-CM    1  Spinal stenosis, lumbar region with neurogenic claudication  M48 062    2  Other intervertebral disc displacement, lumbar region  M51 26                   Subjective: Patient reports she has been very busy with work, having multiple 14 hour days  This caused her to feel very stiff lately  Objective: See treatment diary below  q-ped TA and arm lift added to HEP  FOTO: Initial: 57 Today: 65 Predicted: 75      Assessment: Patient    is making appropriate progress in regards to recovery and functional goals  Cueing was required throughout session to find neutral spine and keep that during dynamic movements  She is improving TA activation in different positions  Patient would benefit from continued PT to maximize function  Plan: Continue per plan of care  Progress treatment as tolerated  Precautions: L4 - L5 LAMINECTOMY, DISCECTOMY, BILATERAL FORAMINOTOMY on 2021      Manuals 6/23 6/29 7/7 7/19 8/3        HS Stretch             HF Stretch             STM to Lumbar parspinals  KD KD KD KD                     Neuro Re-Ed             TA in supine  5" hold  1 min    10" hold  1 min 10"  10x 10"  10x 10"  10x        TA march  1 min x2           TA heel slide  1 min ea  TA ball squeeze  1 min ea  3"  10x     With bridge  2x10 With bridge  3x10         TA with SLR   10x ea 2x10 ea  Stand TA with Shoulder Ext   GTB  2x10 GTB  2x10  GTB  3x12        Seated Multifidus Press    GTB  2x10 ea  GTB  3x10 ea          q-ped TA     5"  2 min        q-ped TA arm lift     12x ea                        Ther Ex             Bike  5 min 5 min 5 min 5 min        DKTC     5"  10x        Child's Pose   assessed and reviewed          Seated Lumbar Flexion   forward  reviewed          Treadmill             Ankle Pump Sciatic Nerve Glide  10x ea  10x ea  10x ea  10x ea  HS Stretch  3x30" reviewed          HF Stretch  3x30" reviewed          Piriformis Stretch     reviewed        Clamshells  Pink  3x10 Pink  3x12 Pink  3x15 GTB  3x10        Stand Hip Abd  NV 2x10 ea  OTB  2x10 ea  Pink  3x10 ea                                                  HEP and Education KD  KD KD KD        Ther Activity             Lifting Mechanics                                       Gait Training                                                    Modalities

## 2021-08-13 ENCOUNTER — OFFICE VISIT (OUTPATIENT)
Dept: PHYSICAL THERAPY | Facility: CLINIC | Age: 56
End: 2021-08-13
Payer: COMMERCIAL

## 2021-08-13 DIAGNOSIS — M51.26 OTHER INTERVERTEBRAL DISC DISPLACEMENT, LUMBAR REGION: ICD-10-CM

## 2021-08-13 DIAGNOSIS — M48.062 SPINAL STENOSIS, LUMBAR REGION WITH NEUROGENIC CLAUDICATION: Primary | ICD-10-CM

## 2021-08-13 PROCEDURE — 97110 THERAPEUTIC EXERCISES: CPT

## 2021-08-13 PROCEDURE — 97112 NEUROMUSCULAR REEDUCATION: CPT

## 2021-08-13 PROCEDURE — 97140 MANUAL THERAPY 1/> REGIONS: CPT

## 2021-08-13 NOTE — PROGRESS NOTES
Discharge Note     Today's date: 2021  Patient name: Adilene Moran  : 1965  MRN: [id number]  Referring provider: Hector Talamantes MD  Dx:   Encounter Diagnosis     ICD-10-CM    1  Spinal stenosis, lumbar region with neurogenic claudication  M48 062    2  Other intervertebral disc displacement, lumbar region  M51 26                 Assessment  Alexa Santos has been compliant with attending PT and home exercise program since initial eval   Mervat Peres  has made improvements in objective data and achieved desired functional goals  Patient reports having returned to their prior level or function  It was mutually agreed to Discharge to home exercise program at this time  Patient has good understanding of provided home exercise program and was instructed to call with any questions or if issues should arise  Goals  STG (3-4 weeks):  Patient will successfully transition to HEP  - MET  Patient will be able to manage symptoms independently  - MET  Patient will be able to perform proper TA contraction  - MET  Patient will report no limitations in ADL's  - MET  Patient will improve deep core contraction score to 5 - 8/10  - MET      LTG (6-8weeks):  Patient will improve deep core contraction score to 8 - 10/10  - MET  Patient will be able to demonstrate proper activation of core musculature during dynamic activities  - MET  Patient will report no limitations in work related activities  - MET  Patient will report no problems in recreational activities  - MET  Patient will demonstrate proper lifting mechanics  - MET  Patient will improve FOTO score to projected value by the end of treatment   - MET          Plan  Patient has been discharged to [facility]  He will contact us with any questions or concerns          Subjective Evaluation    History of Present Illness  Date of surgery: 2021  Mechanism of injury: surgery  Mechanism of injury:     D/C on 2021: Most difficulty with standing for prolonged periods of time is the most difficult  She has been able to paint and stand with some fatigue  She is pleased with her progress  Pain  Current pain ratin  At best pain ratin  At worst pain rating: 3 (if I have pain, it is only a 3)  Location: "on either side of my spine"    IE: Adilene Moran is a 63 y o  female who came to outpatient PT on 2021  Patient presents 8 weeks post op L4 - L5 LAMINECTOMY, DISCECTOMY, BILATERAL FORAMINOTOMY on 2021  Overall she has had less pain week by week  Patient stopped wearing brace a week and a half ago  Patient denies any radicular pain, N/T  The patient's greatest concerns are  the pain she is experiencing, worry over not knowing what's wrong, concern at no signs of improvement and fear of not being able to keep active  Patient is currently taking nothing for pain  Patient work:     Denied any [address]  Patient finds aggravating factors are:  1 ) bending  2 ) carrying heavy things  3 ) getting in and out of car  4 ) sitting  5 ) repetitive movements    Patients finds most relieving factors are:  1 ) rest    No further referral appears necessary at this time based upon examination results  Patient's goals for therapy: decrease pain, increase motion, increase strength  Patient's goals: decrease pain,   Pain  Current pain ratin  At best pain ratin  At worst pain ratin  Location: "on either side of my incisions"        Objective     Concurrent Complaints  Negative for bladder dysfunction, bowel dysfunction and saddle (S4) numbness    Palpation   Left   Hypertonic in the lumbar paraspinals  Right   Hypertonic in the lumbar paraspinals       Neurological Testing     Sensation   Lumbar   Left   Intact: light touch    Right   Intact: light touch    Strength/Myotome Testing     Lumbar   Left   Heel walk: normal  Toe walk: normal    Right   Heel walk: normal  Toe walk: normal    Left Hip   Planes of Motion   Flexion: 4-    Right Hip   Planes of Motion   Flexion: 4-    Left Knee   Flexion: 4-  Extension: 4-    Right Knee   Flexion: 4  Extension: 4    Left Ankle/Foot   Dorsiflexion: 5  Great toe extension: 4    Right Ankle/Foot   Dorsiflexion: 5  Great toe extension: 4    Additional Strength Details  Core strength:    Deep Core Muscle Contraction Scale Score: 8/10    Deep Muscle Contraction Scale :   Quality of contraction: 3  No contraction : 0  Rapid, superficial contraction: 1  Just perceptible contraction: 2  Gentle, slow contraction: 3    Substitution: 2  Resting substitution:0  Moderate to strong substitution:1  Subtle perceptible substitution:2  No substitution:3      Symmetry: 1  Unilateral contraction:0  Bilateral but asymmetrical contraction:1  Symmetrical contraction:2     Breathin  Inability or difficulty with breathing during contraction: 0  Able to hold contraction while maintaining breathin    Holdin  Holding < 10 seconds:  0  Holding > 10 seconds: 1      Tests     Lumbar     Left   Negative crossed SLR  Right   Negative crossed SLR  Left Hip   Negative EKTA and FADIR  Right Hip   Negative EKTA and FADIR  Additional Tests Details  HS 90-90:    Left: 20  Right: 20       Precautions: L4 - L5 LAMINECTOMY, DISCECTOMY, BILATERAL FORAMINOTOMY on 2021      Manuals  7 8/3 8/13       HS Stretch             HF Stretch             STM to Lumbar parspinals  KD KD KD KD KD       Re-Eval      KD                    Neuro Re-Ed             TA in supine  5" hold  1 min    10" hold  1 min 10"  10x 10"  10x 10"  10x NP       TA march  1 min x2           TA heel slide  1 min ea  TA ball squeeze  1 min ea  3"  10x     With bridge  2x10 With bridge  3x10         TA with SLR   10x ea 2x10 ea  Stand TA with Shoulder Ext   GTB  2x10 GTB  2x10  GTB  3x12        Seated Multifidus Press    GTB  2x10 ea      GTB  3x10 ea          q-ped TA     5"  2 min 5"  10x       q-ped TA arm lift     12x ea  10x ea  q-ped TA leg kick      2x10 ea  Ther Ex             Bike  5 min 5 min 5 min 5 min 5 min       DKTC     5"  10x 5"  10x       Child's Pose   assessed and reviewed          Seated Lumbar Flexion   forward  reviewed          Treadmill             Ankle Pump Sciatic Nerve Glide  10x ea  10x ea  10x ea  10x ea  10x ea  HS Stretch  3x30" reviewed          HF Stretch  3x30" reviewed          Piriformis Stretch     reviewed        Clamshells  Pink  3x10 Pink  3x12 Pink  3x15 GTB  3x10 reviewed       Stand Hip Abd  NV 2x10 ea  OTB  2x10 ea  Pink  3x10 ea    reviewed                                              HEP and Education KD  KD KD KD KD       Ther Activity             Lifting Mechanics                                       Gait Training                                                    Modalities

## 2021-10-05 ENCOUNTER — OFFICE VISIT (OUTPATIENT)
Dept: FAMILY MEDICINE CLINIC | Facility: CLINIC | Age: 56
End: 2021-10-05
Payer: COMMERCIAL

## 2021-10-05 VITALS
OXYGEN SATURATION: 99 % | DIASTOLIC BLOOD PRESSURE: 90 MMHG | HEART RATE: 128 BPM | BODY MASS INDEX: 24.59 KG/M2 | WEIGHT: 138.8 LBS | HEIGHT: 63 IN | SYSTOLIC BLOOD PRESSURE: 150 MMHG | TEMPERATURE: 98.9 F

## 2021-10-05 DIAGNOSIS — Z13.1 SCREENING FOR DIABETES MELLITUS: ICD-10-CM

## 2021-10-05 DIAGNOSIS — E55.9 VITAMIN D DEFICIENCY: ICD-10-CM

## 2021-10-05 DIAGNOSIS — Z23 NEED FOR INFLUENZA VACCINATION: Primary | ICD-10-CM

## 2021-10-05 DIAGNOSIS — E78.2 MIXED HYPERLIPIDEMIA: ICD-10-CM

## 2021-10-05 DIAGNOSIS — Z82.0 FAMILY HISTORY OF SPINOCEREBELLAR ATAXIA: ICD-10-CM

## 2021-10-05 DIAGNOSIS — R42 DIZZINESS: ICD-10-CM

## 2021-10-05 PROCEDURE — 99213 OFFICE O/P EST LOW 20 MIN: CPT | Performed by: FAMILY MEDICINE

## 2021-10-05 PROCEDURE — 3008F BODY MASS INDEX DOCD: CPT | Performed by: FAMILY MEDICINE

## 2021-10-05 PROCEDURE — 1036F TOBACCO NON-USER: CPT | Performed by: FAMILY MEDICINE

## 2021-10-05 PROCEDURE — 90682 RIV4 VACC RECOMBINANT DNA IM: CPT | Performed by: FAMILY MEDICINE

## 2021-10-05 PROCEDURE — 90471 IMMUNIZATION ADMIN: CPT | Performed by: FAMILY MEDICINE

## 2021-10-05 RX ORDER — LIDOCAINE 50 MG/G
OINTMENT TOPICAL AS NEEDED
COMMUNITY
Start: 2021-08-25

## 2021-10-05 RX ORDER — FLUOCINOLONE ACETONIDE 0.11 MG/ML
OIL TOPICAL AS NEEDED
COMMUNITY
Start: 2021-07-02 | End: 2022-04-19

## 2021-10-13 LAB
25(OH)D3+25(OH)D2 SERPL-MCNC: 30.2 NG/ML (ref 30–100)
ALBUMIN SERPL-MCNC: 4.6 G/DL (ref 3.8–4.9)
ALBUMIN/GLOB SERPL: 2.3 {RATIO} (ref 1.2–2.2)
ALP SERPL-CCNC: 63 IU/L (ref 44–121)
ALT SERPL-CCNC: 14 IU/L (ref 0–32)
AST SERPL-CCNC: 20 IU/L (ref 0–40)
BASOPHILS # BLD AUTO: 0.1 X10E3/UL (ref 0–0.2)
BASOPHILS NFR BLD AUTO: 1 %
BILIRUB SERPL-MCNC: 0.4 MG/DL (ref 0–1.2)
BUN SERPL-MCNC: 13 MG/DL (ref 6–24)
BUN/CREAT SERPL: 17 (ref 9–23)
CALCIUM SERPL-MCNC: 9.4 MG/DL (ref 8.7–10.2)
CHLORIDE SERPL-SCNC: 104 MMOL/L (ref 96–106)
CHOLEST SERPL-MCNC: 248 MG/DL (ref 100–199)
CO2 SERPL-SCNC: 24 MMOL/L (ref 20–29)
CREAT SERPL-MCNC: 0.76 MG/DL (ref 0.57–1)
EOSINOPHIL # BLD AUTO: 0.2 X10E3/UL (ref 0–0.4)
EOSINOPHIL NFR BLD AUTO: 4 %
ERYTHROCYTE [DISTWIDTH] IN BLOOD BY AUTOMATED COUNT: 12.4 % (ref 11.7–15.4)
EST. AVERAGE GLUCOSE BLD GHB EST-MCNC: 123 MG/DL
GLOBULIN SER-MCNC: 2 G/DL (ref 1.5–4.5)
GLUCOSE SERPL-MCNC: 103 MG/DL (ref 65–99)
HBA1C MFR BLD: 5.9 % (ref 4.8–5.6)
HCT VFR BLD AUTO: 37.6 % (ref 34–46.6)
HDLC SERPL-MCNC: 70 MG/DL
HGB BLD-MCNC: 12.8 G/DL (ref 11.1–15.9)
IMM GRANULOCYTES # BLD: 0 X10E3/UL (ref 0–0.1)
IMM GRANULOCYTES NFR BLD: 0 %
LDLC SERPL CALC-MCNC: 156 MG/DL (ref 0–99)
LDLC/HDLC SERPL: 2.2 RATIO (ref 0–3.2)
LYMPHOCYTES # BLD AUTO: 1.3 X10E3/UL (ref 0.7–3.1)
LYMPHOCYTES NFR BLD AUTO: 32 %
MCH RBC QN AUTO: 30.4 PG (ref 26.6–33)
MCHC RBC AUTO-ENTMCNC: 34 G/DL (ref 31.5–35.7)
MCV RBC AUTO: 89 FL (ref 79–97)
MONOCYTES # BLD AUTO: 0.3 X10E3/UL (ref 0.1–0.9)
MONOCYTES NFR BLD AUTO: 7 %
NEUTROPHILS # BLD AUTO: 2.3 X10E3/UL (ref 1.4–7)
NEUTROPHILS NFR BLD AUTO: 56 %
PLATELET # BLD AUTO: 226 X10E3/UL (ref 150–450)
POTASSIUM SERPL-SCNC: 4 MMOL/L (ref 3.5–5.2)
PROT SERPL-MCNC: 6.6 G/DL (ref 6–8.5)
RBC # BLD AUTO: 4.21 X10E6/UL (ref 3.77–5.28)
SL AMB EGFR AFRICAN AMERICAN: 101 ML/MIN/1.73
SL AMB EGFR NON AFRICAN AMERICAN: 87 ML/MIN/1.73
SL AMB VLDL CHOLESTEROL CALC: 22 MG/DL (ref 5–40)
SODIUM SERPL-SCNC: 140 MMOL/L (ref 134–144)
TRIGL SERPL-MCNC: 123 MG/DL (ref 0–149)
TSH SERPL DL<=0.005 MIU/L-ACNC: 1.53 UIU/ML (ref 0.45–4.5)
WBC # BLD AUTO: 4.2 X10E3/UL (ref 3.4–10.8)

## 2021-10-19 ENCOUNTER — HOSPITAL ENCOUNTER (OUTPATIENT)
Dept: MRI IMAGING | Facility: HOSPITAL | Age: 56
Discharge: HOME | End: 2021-10-19
Payer: COMMERCIAL

## 2021-10-19 DIAGNOSIS — Z82.0 FAMILY HISTORY OF SPINOCEREBELLAR ATAXIA: ICD-10-CM

## 2021-10-19 DIAGNOSIS — R42 DIZZINESS: ICD-10-CM

## 2021-10-19 PROCEDURE — 70551 MRI BRAIN STEM W/O DYE: CPT

## 2021-10-19 PROCEDURE — G1004 CDSM NDSC: HCPCS

## 2021-11-12 ENCOUNTER — HOSPITAL ENCOUNTER (OUTPATIENT)
Dept: NON INVASIVE DIAGNOSTICS | Facility: HOSPITAL | Age: 56
Discharge: HOME | End: 2021-11-12
Payer: COMMERCIAL

## 2021-11-12 VITALS — SYSTOLIC BLOOD PRESSURE: 132 MMHG | DIASTOLIC BLOOD PRESSURE: 78 MMHG | HEART RATE: 94 BPM | OXYGEN SATURATION: 100 %

## 2021-11-12 DIAGNOSIS — R07.89 ATYPICAL CHEST PAIN: ICD-10-CM

## 2021-11-12 DIAGNOSIS — Z82.49 FAMILY HISTORY OF CORONARY ARTERY DISEASE: ICD-10-CM

## 2021-11-12 LAB
BASELINE ST DEPRESSION: 0 MM
MAX HR PERCENT: 106 %
MAX HR: 57 BPM
RATE PRESSURE PRODUCT: NORMAL
SL CV STRESS RECOVERY BP: NORMAL MMHG
SL CV STRESS RECOVERY HR: 116 BPM
SL CV STRESS RECOVERY O2 SAT: 100 %
SL CV STRESS STAGE REACHED: 4
STRESS ANGINA INDEX: 0
STRESS BASELINE BP: NORMAL MMHG
STRESS BASELINE HR: 94 BPM
STRESS DUKE TREADMILL SCORE: 12
STRESS O2 SAT REST: 100 %
STRESS PEAK HR: 171 BPM
STRESS PERCENT HR: 106 %
STRESS POST ESTIMATED WORKLOAD: 13.4 METS
STRESS POST EXERCISE DUR MIN: 12 MIN
STRESS POST O2 SAT PEAK: 100 %
STRESS POST PEAK BP: 208 MMHG
STRESS ST DEPRESSION: 0 MM
STRESS TARGET HR: 171 BPM

## 2021-11-12 PROCEDURE — 93017 CV STRESS TEST TRACING ONLY: CPT

## 2021-11-12 PROCEDURE — 93018 CV STRESS TEST I&R ONLY: CPT

## 2021-11-12 PROCEDURE — 93016 CV STRESS TEST SUPVJ ONLY: CPT

## 2021-11-16 LAB
CHEST PAIN STATEMENT: NORMAL
MAX DIASTOLIC BP: 86 MMHG
MAX HEART RATE: 173 BPM
MAX PREDICTED HEART RATE: 163 BPM
MAX. SYSTOLIC BP: 216 MMHG
PROTOCOL NAME: NORMAL
TARGET HR FORMULA: NORMAL
TEST INDICATION: NORMAL
TIME IN EXERCISE PHASE: NORMAL

## 2021-11-24 ENCOUNTER — TELEPHONE (OUTPATIENT)
Dept: NEUROLOGY | Facility: CLINIC | Age: 56
End: 2021-11-24

## 2021-12-08 ENCOUNTER — CONSULT (OUTPATIENT)
Dept: NEUROLOGY | Facility: CLINIC | Age: 56
End: 2021-12-08
Payer: COMMERCIAL

## 2021-12-08 VITALS — BODY MASS INDEX: 23.95 KG/M2 | DIASTOLIC BLOOD PRESSURE: 77 MMHG | WEIGHT: 136 LBS | SYSTOLIC BLOOD PRESSURE: 136 MMHG

## 2021-12-08 DIAGNOSIS — R42 DIZZINESS: ICD-10-CM

## 2021-12-08 DIAGNOSIS — Z82.0 FAMILY HISTORY OF SPINOCEREBELLAR ATAXIA: ICD-10-CM

## 2021-12-08 PROCEDURE — 1036F TOBACCO NON-USER: CPT | Performed by: PSYCHIATRY & NEUROLOGY

## 2021-12-08 PROCEDURE — 99204 OFFICE O/P NEW MOD 45 MIN: CPT | Performed by: PSYCHIATRY & NEUROLOGY

## 2021-12-20 DIAGNOSIS — F41.9 ANXIETY: ICD-10-CM

## 2021-12-21 RX ORDER — ALPRAZOLAM 0.25 MG/1
0.25 TABLET ORAL
Qty: 30 TABLET | Refills: 0 | Status: SHIPPED | OUTPATIENT
Start: 2021-12-21

## 2021-12-22 ENCOUNTER — HOSPITAL ENCOUNTER (OUTPATIENT)
Dept: MAMMOGRAPHY | Facility: MEDICAL CENTER | Age: 56
Discharge: HOME | End: 2021-12-22
Payer: COMMERCIAL

## 2021-12-22 VITALS — BODY MASS INDEX: 24.1 KG/M2 | WEIGHT: 136.02 LBS | HEIGHT: 63 IN

## 2021-12-22 DIAGNOSIS — Z12.31 ENCOUNTER FOR SCREENING MAMMOGRAM FOR MALIGNANT NEOPLASM OF BREAST: ICD-10-CM

## 2021-12-22 PROCEDURE — 77063 BREAST TOMOSYNTHESIS BI: CPT

## 2021-12-22 PROCEDURE — 77067 SCR MAMMO BI INCL CAD: CPT

## 2022-04-18 ENCOUNTER — TELEPHONE (OUTPATIENT)
Dept: FAMILY MEDICINE CLINIC | Facility: CLINIC | Age: 57
End: 2022-04-18

## 2022-04-18 NOTE — TELEPHONE ENCOUNTER
Pt called stating that her face, tongue, left arm into fingers are numb  I spoke with KV and she said pt should go to ER  I relayed the message to pt

## 2022-04-20 ENCOUNTER — OFFICE VISIT (OUTPATIENT)
Dept: FAMILY MEDICINE CLINIC | Facility: CLINIC | Age: 57
End: 2022-04-20
Payer: COMMERCIAL

## 2022-04-20 ENCOUNTER — APPOINTMENT (OUTPATIENT)
Dept: RADIOLOGY | Facility: CLINIC | Age: 57
End: 2022-04-20
Payer: COMMERCIAL

## 2022-04-20 VITALS
OXYGEN SATURATION: 98 % | BODY MASS INDEX: 24.13 KG/M2 | HEIGHT: 63 IN | WEIGHT: 136.2 LBS | TEMPERATURE: 98.2 F | SYSTOLIC BLOOD PRESSURE: 134 MMHG | HEART RATE: 116 BPM | RESPIRATION RATE: 16 BRPM | DIASTOLIC BLOOD PRESSURE: 84 MMHG

## 2022-04-20 DIAGNOSIS — M54.2 NECK PAIN: Primary | ICD-10-CM

## 2022-04-20 DIAGNOSIS — R20.0 FINGER NUMBNESS: ICD-10-CM

## 2022-04-20 DIAGNOSIS — M54.2 NECK PAIN: ICD-10-CM

## 2022-04-20 DIAGNOSIS — K22.4 ESOPHAGEAL SPASM: ICD-10-CM

## 2022-04-20 PROCEDURE — 99213 OFFICE O/P EST LOW 20 MIN: CPT | Performed by: FAMILY MEDICINE

## 2022-04-20 PROCEDURE — 72050 X-RAY EXAM NECK SPINE 4/5VWS: CPT

## 2022-04-20 PROCEDURE — 1036F TOBACCO NON-USER: CPT | Performed by: FAMILY MEDICINE

## 2022-04-20 PROCEDURE — 3008F BODY MASS INDEX DOCD: CPT | Performed by: FAMILY MEDICINE

## 2022-04-20 RX ORDER — NITROGLYCERIN 0.6 MG/1
0.6 TABLET SUBLINGUAL
Qty: 50 TABLET | Refills: 1 | Status: SHIPPED | OUTPATIENT
Start: 2022-04-20

## 2022-04-20 NOTE — PROGRESS NOTES
Assessment/Plan:  Patient is a 79-year-old female seen with complaints done neck pain  It radiates to blood and interscapular area  She has numbness in fingers and face  Numbness of face seems like facial nerve involvement, but hand numbness would be more related to cervical spine  X-rays of  her cervical spine were ordered  Diagnoses and all orders for this visit:    Neck pain  -     Cancel: XR spine cervical complete 4 or 5 vw non injury; Future  -     XR spine cervical complete 4 or 5 vw non injury; Future    Finger numbness  -     Cancel: XR spine cervical complete 4 or 5 vw non injury; Future  -     XR spine cervical complete 4 or 5 vw non injury; Future    Esophageal spasm  -     nitroglycerin (NITROSTAT) 0 6 mg SL tablet; Place 1 tablet (0 6 mg total) under the tongue every 5 (five) minutes as needed (esophageal spasm)          Subjective:   Chief Complaint   Patient presents with    Neck Pain     right side; pain radiates to shoulder blade or up to her head; worsened in the past month; left sided arm numbness, fingers; sometimes her face and tongue        Patient ID: Deejay Maynard is a 61 y o  female  Pain medial to right scapula goes up to right occiput and to right frontal   Has noticed numbness in fingers - 4th and 5th left hand up to forearm  Numbness on left side of face, left side of tongue, and lower lip  Neck Pain   Associated symptoms include numbness  Pertinent negatives include no weakness  The following portions of the patient's history were reviewed and updated as appropriate: allergies, current medications, past family history, past medical history, past social history, past surgical history and problem list     Review of Systems   Musculoskeletal: Positive for back pain and neck pain  Neurological: Positive for numbness  Negative for weakness           Objective:      /84 (BP Location: Left arm, Patient Position: Sitting, Cuff Size: Adult)   Pulse (!) 116   Temp 98 2 °F (36 8 °C) (Temporal)   Resp 16   Ht 5' 3" (1 6 m)   Wt 61 8 kg (136 lb 3 2 oz)   LMP  (LMP Unknown)   SpO2 98%   BMI 24 13 kg/m²          Physical Exam  Vitals and nursing note reviewed  Constitutional:       General: She is not in acute distress  HENT:      Head: Normocephalic  Musculoskeletal:         General: Tenderness present  No swelling  Comments: Decreased range of motion of cervical spine - rotation and forward bending  Skin:     Findings: No rash  Neurological:      Mental Status: She is alert and oriented to person, place, and time  Sensory: No sensory deficit        Deep Tendon Reflexes: Reflexes normal    Psychiatric:         Mood and Affect: Mood normal

## 2022-05-02 ENCOUNTER — EVALUATION (OUTPATIENT)
Dept: PHYSICAL THERAPY | Facility: CLINIC | Age: 57
End: 2022-05-02
Payer: COMMERCIAL

## 2022-05-02 DIAGNOSIS — R20.0 FINGER NUMBNESS: ICD-10-CM

## 2022-05-02 DIAGNOSIS — M54.2 NECK PAIN: Primary | ICD-10-CM

## 2022-05-02 PROCEDURE — 97162 PT EVAL MOD COMPLEX 30 MIN: CPT | Performed by: PHYSICAL THERAPIST

## 2022-05-02 NOTE — PROGRESS NOTES
PT Evaluation     Today's date: 2022  Patient name: Thor Wooten  : 1965  MRN: [id number]  Referring provider: Aurora Spaulding DO  Dx:   Encounter Diagnosis     ICD-10-CM    1  Neck pain  M54 2 Ambulatory Referral to Physical Therapy   2  Finger numbness  R20 0 Ambulatory Referral to Physical Therapy                  Assessment  Assessment details: Thor Wooten is a 61 y o  female presenting to physical therapy with referral secondary to acute left sided ecervical spine pain 6 weeks prior  Patient presents with pain, decreased range of motion, gait/balance dysfunction and decreased activity tolerance  Assessment indicates possibly upper cervical instability with centralization of symptoms via sharp teetee testing with significant cervical ROM deficits and reproduction of numbness/tingling into the distal UE and intermittent numbness/tingling into the left face lips, and tongue  Secondary to these impairments, patient has increased difficulty performing ADL's, household chores and  work related tasks  Deven Modi would benefit from skilled PT to address these issues and maximize function    Thank you for the referral   Impairments: abnormal muscle tone, abnormal or restricted ROM, abnormal movement, activity intolerance, impaired physical strength and pain with function  Understanding of Dx/Px/POC: excellent   Prognosis: good    Goals  Short Term Goals: to be achieved by 4 weeks  1) Patient to be independent with initial HEP  2) Decrease pain to < or equal to 3/10 at its worst  3) Increase lumbar spine ROM by 25% in all deficient planes  4) Increase LE strength by 1/2 MMT grade in all deficient planes  5) Patient to report decreased sleep interruption secondary to pain    Long Term Goals: to be achieved by discharge  1) Increase FOTO score > or equal to expected outcome  2) Patient to be independent with comprehensive HEP  3) Abolish pain for improved quality of life  4) Lumbar spine AROM WNL all planes to improve a/iadls  5) Increase LE strength to 5/5 MMT grade in all planes to improve a/iadls  6) Patient to report no sleep interruption secondary to pain  7) patient will demonstrate ability to resume normal walking exercise program without face or UE reproduction of symptoms      Plan  Patient would benefit from: skilled PT  Planned modality interventions: TENS and thermotherapy: hydrocollator packs  Planned therapy interventions: joint mobilization, manual therapy, neuromuscular re-education, patient education, strengthening, stretching, therapeutic exercise, home exercise program, ADL training and abdominal trunk stabilization  Frequency: 1/2x/wk  Duration in weeks: 8  Treatment plan discussed with: patient        Subjective Evaluation    History of Present Illness  Mechanism of injury: Patient reports to outpatient PT secondary to the onset of cervical pain with radicular symptoms into the left UE  Patient does note a history of chronic neck pain (15 yrs) that includes symptoms on the right side radiate from the scapular to the occiput and temple with UE usage  In regards to the acute symptoms, patient notes that she gets numbness/tingling into hand on the right (thumb and index), face and and tongue most noted with her walking routine, 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  Patient denies any trauma or significant history regarding her symptoms  Recurrent probem    Quality of life: good          Objective     Concurrent Complaints  Positive for disturbed sleep, headaches, nausea/motion sickness and visual change   Negative for night pain, dizziness, faints and tinnitus    Postural Observations  Seated posture: fair  Standing posture: fair        Neurological Testing     Sensation   Cervical/Thoracic   Left   Intact: light touch    Right   Intact: light touch    Reflexes   Left   Biceps (C5/C6): brisk (3+)  Brachioradialis (C6): brisk (3+)  Triceps (C7): brisk (3+)  Hernandez's reflex: negative    Right   Biceps (C5/C6): brisk (3+)  Brachioradialis (C6): brisk (3+)  Triceps (C7): brisk (3+)  Hernandez's reflex: negative    Active Range of Motion   Cervical/Thoracic Spine       Cervical    Flexion:  Restriction level: minimal  Extension:  WFL  Left lateral flexion: 20 degrees      Right lateral flexion: 20 degrees      Left rotation: 46 degrees Restriction level: moderate  Right rotation: 35 degrees    Restriction level: maximal    Joint Play     Hypomobile: C3, C4, C5, C6, C7, T1 and T2   Mechanical Assessment    Cervical    Seated Protrusion: repeated movements   Pain location: peripheralized  Pain intensity: worse  Seated left rotation: repeated movements  Pain location: peripheralized  Pain intensity: worse  Seated right rotation: repeated movements  Pain location: peripheralized  Pain intensity: worse  Supine retractation: repeated movements  Pain location: centralized  Pain intensity: better  Supine extension: repeated movements  Pain location: peripheralized  Pain intensity: worse    Thoracic      Lumbar      Strength/Myotome Testing   Cervical Spine     Left   Normal strength    Right   Normal strength    Tests   Cervical   Positive Sharp-Davion test   Negative alar ligament test, transverse ligament test and VBI  Left   Negative Spurling's Test A and Spurling's Test B  Right   Negative Spurling's Test A and Spurling's Test B  Left Shoulder   Positive ULTT3  Negative Adson maneuver  Additional Tests Details  (+) suboccipital tightness    (+) VORx1 vertical deficit with saccades  Neuro Exam:     Headaches   Patient reports headaches: Yes         Flowsheet Rows      Most Recent Value   PT/OT G-Codes    Current Score 58   Projected Score 66             Precautions: Asthma, GERD, B/L laminectomy and discectomy      Manuals 5/2            (T1-T4) YONY HERRERA             Radial nerve glides L side             R suboccipital release                          Neuro Re-Ed Ther Ex             Supine cervical retractions             Supine cervical retraction w/ biofeedback             TB rows w/ cervical retraction             Scap retraction w/ ER w/ cervical retraction                                                                 Ther Activity                                       Gait Training                                       Modalities

## 2022-05-09 ENCOUNTER — OFFICE VISIT (OUTPATIENT)
Dept: PHYSICAL THERAPY | Facility: CLINIC | Age: 57
End: 2022-05-09
Payer: COMMERCIAL

## 2022-05-09 DIAGNOSIS — R20.0 FINGER NUMBNESS: ICD-10-CM

## 2022-05-09 DIAGNOSIS — M54.2 NECK PAIN: Primary | ICD-10-CM

## 2022-05-09 PROCEDURE — 97110 THERAPEUTIC EXERCISES: CPT | Performed by: PHYSICAL THERAPIST

## 2022-05-09 PROCEDURE — 97140 MANUAL THERAPY 1/> REGIONS: CPT | Performed by: PHYSICAL THERAPIST

## 2022-05-09 NOTE — PROGRESS NOTES
Daily Note     Today's date: 2022  Patient name: Sue Velazquez  : 1965  MRN: [id number]  Referring provider: Archie Crook DO  Dx:   Encounter Diagnosis     ICD-10-CM    1  Neck pain  M54 2    2  Finger numbness  R20 0                   Subjective: Patient reports HEP compliance with only 1 occurrence of numbness into her face but that distal UE symptoms remain with her morning walk  Objective: See treatment diary below      Assessment: Patient demonstrating centralization of symptoms with current retraction/extension progression  Improved DNF activation present with cues but fatigue present displacing approximately 8mmHg  Updated HEP to include scapulothoracic strengthening with appropriate cervical retraction/extension  Plan: Continue per plan of care        Precautions: Asthma, GERD, B/L laminectomy and discectomy      Manuals            (T1-T4) PA JM  G4           Radial nerve glides L side  G4           R suboccipital release  3'/3'                        Neuro Re-Ed                                                                                                        Ther Ex             Supine cervical retractions w/ extension at top of table 2x10 2x10           Supine cervical retraction w/ biofeedback  10 x10"           TB rows w/ cervical retraction  BTB  3x10 x2"           Scap retraction w/ ER w/ cervical retraction  OTB  3x10 x2"                                                               Ther Activity                                       Gait Training                                       Modalities             Cervical traction  NV

## 2022-05-18 ENCOUNTER — OFFICE VISIT (OUTPATIENT)
Dept: PHYSICAL THERAPY | Facility: CLINIC | Age: 57
End: 2022-05-18
Payer: COMMERCIAL

## 2022-05-18 DIAGNOSIS — R20.0 FINGER NUMBNESS: ICD-10-CM

## 2022-05-18 DIAGNOSIS — M54.2 NECK PAIN: Primary | ICD-10-CM

## 2022-05-18 PROCEDURE — 97110 THERAPEUTIC EXERCISES: CPT | Performed by: PHYSICAL THERAPIST

## 2022-05-18 PROCEDURE — 97140 MANUAL THERAPY 1/> REGIONS: CPT | Performed by: PHYSICAL THERAPIST

## 2022-05-18 NOTE — PROGRESS NOTES
Daily Note     Today's date: 2022  Patient name: Federica Ramos  : 1965  MRN: [id number]  Referring provider: Ping Patient, DO  Dx:   Encounter Diagnosis     ICD-10-CM    1  Neck pain  M54 2    2  Finger numbness  R20 0                   Subjective: Patient reports HEP compliance and states that overall her symptoms are occurring less frequently into her hand  Patient states that with postural correction her symptoms improve  A few instances of fascial numbness and hand numbness with higher intensity exercise  Objective: See treatment diary below      Assessment: Patient demonstrating improved radial nerve length per ULTT  Centralization of symptoms remains with cervical retraction/extension  DNF strength improving (between 8 and 10 mmHg) of displacement into biofeedback  Plan: Continue per plan of care        Precautions: Asthma, GERD, B/L laminectomy and discectomy      Manuals           (T1-T4) YONY HERRERA  G4 G4          Radial nerve glides L side  G4 G4          R suboccipital release  3'/3' 3'/3'                       Neuro Re-Ed                                                                                                        Ther Ex             Supine cervical retractions w/ extension at top of table 2x10 2x10 2x10          Supine cervical retraction w/ biofeedback  10 x10" 10 x10"          TB rows w/ cervical retraction  BTB  3x10 x2" BTB  3x10 x2"          Scap retraction w/ ER w/ cervical retraction  OTB  3x10 x2" GTB  3x10 x2"                                                              Ther Activity                                       Gait Training                                       Modalities             Cervical traction  NV

## 2022-05-23 ENCOUNTER — OFFICE VISIT (OUTPATIENT)
Dept: PHYSICAL THERAPY | Facility: CLINIC | Age: 57
End: 2022-05-23
Payer: COMMERCIAL

## 2022-05-23 DIAGNOSIS — R20.0 FINGER NUMBNESS: ICD-10-CM

## 2022-05-23 DIAGNOSIS — M54.2 NECK PAIN: Primary | ICD-10-CM

## 2022-05-23 PROCEDURE — 97140 MANUAL THERAPY 1/> REGIONS: CPT | Performed by: PHYSICAL THERAPIST

## 2022-05-23 PROCEDURE — 97110 THERAPEUTIC EXERCISES: CPT | Performed by: PHYSICAL THERAPIST

## 2022-05-23 NOTE — PROGRESS NOTES
Daily Note     Today's date: 2022  Patient name: Chandler Childs  : 1965  MRN: [id number]  Referring provider: Rupesh Pires DO  Dx:   Encounter Diagnosis     ICD-10-CM    1  Neck pain  M54 2    2  Finger numbness  R20 0                   Subjective: Patient reports that she has not been very compliant with her HEP since last session and notes some changes in the hand and neck as a result  Patient also notes numbness in her face and tongue continue to occur with activities that increase her HR/BP  Objective: See treatment diary below      Assessment: Patient demonstrates significant improvement in suboccipital length  Mild radial nerve tension present that decreases with neural glides  Improving DNF activation (6-8mm Hg) and centralizing symptoms remain with cervical retractions  Discussed importance of visit with PCP to rule out cardiac involvement with tongue/facial symptoms given her family history of such  Plan: Continue per plan of care        Precautions: Asthma, GERD, B/L laminectomy and discectomy      Manuals          (T1-T4) PA JM  G4 G4 G4         Radial nerve glides L side  G4 G4 G4         R suboccipital release  3'/3' 3'/3' 3'/3'                      Neuro Re-Ed                                                                                                        Ther Ex             Supine cervical retractions w/ extension at top of table 2x10 2x10 2x10 2x10         Supine cervical retraction w/ biofeedback  10 x10" 10 x10" 2x10 x10"         TB rows w/ cervical retraction  BTB  3x10 x2" BTB  3x10 x2" BTB  3x10 x2"         Scap retraction w/ ER w/ cervical retraction  OTB  3x10 x2" GTB  3x10 x2" GTB  3x10 x2"         Cervical retraction w/ TB    GTB  2x10 x2"                                                Ther Activity                                       Gait Training                                       Modalities             Cervical traction  NV

## 2022-05-25 ENCOUNTER — OFFICE VISIT (OUTPATIENT)
Dept: FAMILY MEDICINE CLINIC | Facility: CLINIC | Age: 57
End: 2022-05-25
Payer: COMMERCIAL

## 2022-05-25 VITALS
SYSTOLIC BLOOD PRESSURE: 126 MMHG | DIASTOLIC BLOOD PRESSURE: 72 MMHG | OXYGEN SATURATION: 98 % | HEART RATE: 120 BPM | BODY MASS INDEX: 23.88 KG/M2 | WEIGHT: 134.8 LBS | HEIGHT: 63 IN | TEMPERATURE: 97.8 F

## 2022-05-25 DIAGNOSIS — R20.2 PARESTHESIAS: Primary | ICD-10-CM

## 2022-05-25 PROCEDURE — 1036F TOBACCO NON-USER: CPT | Performed by: FAMILY MEDICINE

## 2022-05-25 PROCEDURE — 99213 OFFICE O/P EST LOW 20 MIN: CPT | Performed by: FAMILY MEDICINE

## 2022-05-25 PROCEDURE — 3008F BODY MASS INDEX DOCD: CPT | Performed by: FAMILY MEDICINE

## 2022-05-25 RX ORDER — NITROGLYCERIN 0.4 MG/1
TABLET SUBLINGUAL
COMMUNITY
Start: 2022-04-20

## 2022-05-25 NOTE — PROGRESS NOTES
[facility]      NAME: Faye Santos  AGE: 61 y o  SEX: female  : 1965   MRN: [id number]    DATE: 2022  TIME: 1:48 PM    Assessment and Plan     Problem List Items Addressed This Visit    None     Visit Diagnoses     Paresthesias    -  Primary    Relevant Orders    NEY Screen w/ Reflex to Titer/Pattern    C-reactive protein    Lyme Total Antibody Profile with reflex to WB    Sedimentation rate, automated    Uric acid    Rheumatoid Factor (IgA, IgG, IgM)      Patient with intermittent paresthesias involving tongue and lips  Physical exam unremarkable  Neurologic exam nonfocal   Normal stress test and MRI of the brain within the last 7 months  Will check rheumatoid profile and observe symptoms  If they persist would consider neurologic evaluation  Return to office in:  P r n  Chief Complaint     Chief Complaint   Patient presents with    Numbness       History of Present Illness     Patient presents with a chief complaint of paresthesias which consist of episodes of numbness and tingling involving her tongue and lips  Symptoms began over a week ago  She primarily has nodes them with exertion but not always  She is currently going to physical therapy for numbness in her left arm which is believed to be related to radicular symptoms from cervical spine  Patient had MRI of the brain 7 months ago as well as stress test 6 months ago for other reasons, both found to be normal       The following portions of the patient's history were reviewed and updated as appropriate: allergies, current medications, past family history, past medical history, past social history, past surgical history and problem list     Review of Systems   Review of Systems   Constitutional: Negative  Respiratory: Negative  Cardiovascular: Negative  Gastrointestinal: Negative  Genitourinary: Negative  Musculoskeletal: Negative  Neurological: Positive for numbness  Psychiatric/Behavioral: Negative  Active Problem List     Patient Active Problem List   Diagnosis    Anxiety    Asthma    Mixed hyperlipidemia    Vitamin D deficiency    Lumbar back pain with radiculopathy affecting right lower extremity    Peroneal tendinitis    Sprain of MCL (medial collateral ligament) of knee    Tremor    Localized, primary osteoarthritis of hand    Urinary tract infection without hematuria    Dysuria       Objective   /72 (BP Location: Left arm, Patient Position: Sitting, Cuff Size: Standard)   Pulse (!) 120   Temp 97 8 °F (36 6 °C) (Tympanic)   Ht 5' 3" (1 6 m)   Wt 61 1 kg (134 lb 12 8 oz)   LMP  (LMP Unknown)   SpO2 98%   BMI 23 88 kg/m²     Physical Exam  Neurological:      General: No focal deficit present  Mental Status: She is oriented to person, place, and time  Cranial Nerves: No cranial nerve deficit  Sensory: No sensory deficit  Motor: No weakness        Gait: Gait normal            Current Medications     Current Outpatient Medications:     ALPRAZolam (XANAX) 0 25 mg tablet, Take 1 tablet (0 25 mg total) by mouth daily at bedtime as needed for anxiety, Disp: 30 tablet, Rfl: 0    budesonide-formoterol (SYMBICORT) 80-4 5 MCG/ACT inhaler, Inhale 2 puffs 2 (two) times a day, Disp: , Rfl:     levalbuterol (XOPENEX HFA) 45 mcg/act inhaler, Inhale 2 puffs every 4 (four) hours as needed, Disp: , Rfl: 0    lidocaine (XYLOCAINE) 5 % ointment, as needed, Disp: , Rfl:     nitroglycerin (NITROSTAT) 0 6 mg SL tablet, Place 1 tablet (0 6 mg total) under the tongue every 5 (five) minutes as needed (esophageal spasm), Disp: 50 tablet, Rfl: 1    omeprazole (PriLOSEC) 20 mg delayed release capsule, Take 20 mg by mouth daily, Disp: , Rfl:     hyoscyamine (ANASPAZ,LEVSIN) 0 125 MG tablet, Take 1 tablet (0 125 mg total) by mouth every 4 (four) hours as needed for cramping (Patient not taking: No sig reported), Disp: 30 tablet, Rfl: 0   nitroglycerin (NITROSTAT) 0 4 mg SL tablet, PLACE 1 TABLET (0 6 MG TOTAL) UNDER THE TONGUE EVERY 5 (FIVE) MINUTES AS NEEDED (ESOPHAGEAL SPASM), Disp: , Rfl:     Health Maintenance     Health Maintenance   Topic Date Due    Pneumococcal Vaccine: Pediatrics (0 to 5 Years) and At-Risk Patients (6 to 59 Years) (1 - PCV) Never done    Annual Physical  12/19/2020    COVID-19 Vaccine (3 - Booster for [place] series) 09/16/2021    PT PLAN OF CARE  06/01/2022    Cervical Cancer Screening  06/14/2022    DTaP,Tdap,and Td Vaccines (2 - Td or Tdap) 06/02/2022    Breast Cancer Screening: Mammogram  12/22/2022    Depression Screening  04/20/2023    BMI: Adult  05/25/2023    Colorectal Cancer Screening  07/14/2027    HIV Screening  Completed    Hepatitis C Screening  Completed    Influenza Vaccine  Completed    HIB Vaccine  Aged Out    Hepatitis B Vaccine  Aged Out    IPV Vaccine  Aged Out    Hepatitis A Vaccine  Aged Out    Meningococcal ACWY Vaccine  Aged Out    HPV Vaccine  Aged Out     Immunization History   Administered Date(s) Administered    COVID-19 PFIZER VACCINE 0 3 ML IM 03/26/2021, 04/16/2021    INFLUENZA 09/21/2017, 09/18/2018, 10/04/2019, 10/13/2020    Influenza, recombinant, quadrivalent,injectable, preservative free 10/05/2021    Influenza, seasonal, injectable 1965    Tdap 06/02/2012       Verena Lucio DO  [facility]

## 2022-06-01 ENCOUNTER — OFFICE VISIT (OUTPATIENT)
Dept: PHYSICAL THERAPY | Facility: CLINIC | Age: 57
End: 2022-06-01
Payer: COMMERCIAL

## 2022-06-01 DIAGNOSIS — R20.0 FINGER NUMBNESS: ICD-10-CM

## 2022-06-01 DIAGNOSIS — M54.2 NECK PAIN: Primary | ICD-10-CM

## 2022-06-01 PROCEDURE — 97110 THERAPEUTIC EXERCISES: CPT | Performed by: PHYSICAL THERAPIST

## 2022-06-01 PROCEDURE — 97140 MANUAL THERAPY 1/> REGIONS: CPT | Performed by: PHYSICAL THERAPIST

## 2022-06-01 NOTE — PROGRESS NOTES
Daily Note     Today's date: 2022  Patient name: Jeniffer Guerrero  : 1965  MRN: [id number]  Referring provider: Dejan Cruz DO  Dx:   Encounter Diagnosis     ICD-10-CM    1  Neck pain  M54 2    2  Finger numbness  R20 0                   Subjective: Patient reports HEP compliance and states that overall her hand symptoms continue to improve and notices the symptoms worsen with cervical flexion/poor postures  Objective: See treatment diary below      Assessment: Patient demonstrates significant improvement in symptoms reduction via cervical retraction/extension  Radial nerve tension is currently minimal and patient tolerates all manuals well  Will continue activity modification and current TE noted below  Plan: Continue per plan of care        Precautions: Asthma, GERD, B/L laminectomy and discectomy      Manuals         (T1-T4) PA JM  G4 G4 G4 G4        Radial nerve glides L side  G4 G4 G4 G4        R suboccipital release  3'/3' 3'/3' 3'/3' 3'/3'                     Neuro Re-Ed                                                                                                        Ther Ex             Supine cervical retractions w/ extension at top of table 2x10 2x10 2x10 2x10 2x10        Supine cervical retraction w/ biofeedback  10 x10" 10 x10" 2x10 x10"         TB rows w/ cervical retraction  BTB  3x10 x2" BTB  3x10 x2" BTB  3x10 x2" BTB  3x10 x2"        Scap retraction w/ ER w/ cervical retraction  OTB  3x10 x2" GTB  3x10 x2" GTB  3x10 x2" GTB  3x10 x2"        Cervical retraction w/ TB    GTB  2x10 x2" GTB  2x10 x2"        Qped cervical retraction + arm lifts     2x10 B/L        Qped cervical retraction     NV                     Ther Activity                                       Gait Training                                       Modalities             Cervical traction  NV

## 2022-06-04 LAB
ANA TITR SER IF: NEGATIVE {TITER}
B BURGDOR IGG PATRN SER IB-IMP: NEGATIVE
B BURGDOR IGM PATRN SER IB-IMP: NEGATIVE
B BURGDOR18KD IGG SER QL IB: ABNORMAL
B BURGDOR23KD IGG SER QL IB: ABNORMAL
B BURGDOR23KD IGM SER QL IB: ABNORMAL
B BURGDOR28KD IGG SER QL IB: ABNORMAL
B BURGDOR30KD IGG SER QL IB: ABNORMAL
B BURGDOR39KD IGG SER QL IB: ABNORMAL
B BURGDOR39KD IGM SER QL IB: ABNORMAL
B BURGDOR41KD IGG SER QL IB: ABNORMAL
B BURGDOR41KD IGM SER QL IB: ABNORMAL
B BURGDOR45KD IGG SER QL IB: ABNORMAL
B BURGDOR58KD IGG SER QL IB: PRESENT
B BURGDOR66KD IGG SER QL IB: ABNORMAL
B BURGDOR93KD IGG SER QL IB: ABNORMAL
CRP SERPL-MCNC: <1 MG/L (ref 0–10)
ERYTHROCYTE [SEDIMENTATION RATE] IN BLOOD BY WESTERGREN METHOD: 2 MM/HR (ref 0–40)
RHEUMATOID FACT SERPL-ACNC: 10.6 IU/ML
URATE SERPL-MCNC: 2.6 MG/DL (ref 3–7.2)

## 2022-06-06 ENCOUNTER — APPOINTMENT (OUTPATIENT)
Dept: PHYSICAL THERAPY | Facility: CLINIC | Age: 57
End: 2022-06-06
Payer: COMMERCIAL

## 2022-06-07 DIAGNOSIS — R20.2 PARESTHESIAS: Primary | ICD-10-CM

## 2022-06-13 ENCOUNTER — OFFICE VISIT (OUTPATIENT)
Dept: PHYSICAL THERAPY | Facility: CLINIC | Age: 57
End: 2022-06-13
Payer: COMMERCIAL

## 2022-06-13 DIAGNOSIS — R20.0 FINGER NUMBNESS: ICD-10-CM

## 2022-06-13 DIAGNOSIS — M54.2 NECK PAIN: Primary | ICD-10-CM

## 2022-06-13 PROCEDURE — 97110 THERAPEUTIC EXERCISES: CPT | Performed by: PHYSICAL THERAPIST

## 2022-06-13 PROCEDURE — 97140 MANUAL THERAPY 1/> REGIONS: CPT | Performed by: PHYSICAL THERAPIST

## 2022-06-13 NOTE — PROGRESS NOTES
PT Re-Evaluation  and PT Discharge    Today's date: 2022  Patient name: Bret Chris  : 1965  MRN: [id number]  Referring provider: Miriam Ramon DO  Dx:   Encounter Diagnosis     ICD-10-CM    1  Neck pain  M54 2    2  Finger numbness  R20 0                   Assessment  Assessment details: Patient is a 61y o  year old female who attended physical therapy for 6 treatment sessions regarding left sided cervical radiculopathy  Patient reports significant improvement at this time which correlates with FOTO scoring  Patient has shown improvement throughout PT by demonstrating decreased pain, increased range of motion, increased strength, improved tolerance to activity and improved gait/balance  Secondary to achieving functional goals and independence with comprehensive Home Exercise Program, Vicky Stone will be discharged from PT at this time  Thank you     Impairments: abnormal muscle tone, abnormal or restricted ROM, abnormal movement, activity intolerance, impaired physical strength and pain with function  Understanding of Dx/Px/POC: excellent   Prognosis: good    Goals  Short Term Goals: to be achieved by 4 weeks  1) Patient to be independent with initial HEP (MET)  2) Decrease pain to < or equal to 3/10 at its worst (MET)  3) Increase cervical spine ROM by 25% in all deficient planes (MET)  4) Increase UE strength by 1/2 MMT grade in all deficient planes (MET)  5) Patient to report decreased sleep interruption secondary to pain (MET)    Long Term Goals: to be achieved by discharge  1) Increase FOTO score > or equal to expected outcome (MET)  2) Patient to be independent with comprehensive HEP (MET)  3) Abolish pain for improved quality of life (MET)  4) Cervical spine AROM WNL all planes to improve a/iadls (PARTIALLY MET)  6) Patient to report no sleep interruption secondary to pain (MET)  7) patient will demonstrate ability to resume normal walking exercise program without face or UE reproduction of symptoms (NOT MET)      Plan  Patient would benefit from: skilled PT  Planned modality interventions: TENS and thermotherapy: hydrocollator packs  Planned therapy interventions: joint mobilization, manual therapy, neuromuscular re-education, patient education, strengthening, stretching, therapeutic exercise, home exercise program, ADL training and abdominal trunk stabilization  Treatment plan discussed with: patient        Subjective Evaluation    History of Present Illness  Mechanism of injury: Patient reports to outpatient PT secondary to the onset of cervical pain with radicular symptoms into the left UE  Patient does note a history of chronic neck pain (15 yrs) that includes symptoms on the right side radiate from the scapular to the occiput and temple with UE usage  In regards to the acute symptoms, patient notes that she gets numbness/tingling into hand on the right (thumb and index), face and and tongue most noted with her walking routine, 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  Patient denies any trauma or significant history regarding her symptoms  2022:             Recurrent probem    Quality of life: good    Pain  Current pain ratin  At best pain ratin  At worst pain ratin  Quality: dull ache and radiating  Relieving factors: relaxation and rest  Aggravating factors: lifting and sitting  Progression: worsening    Social Support    Employment status: working    Diagnostic Tests  No diagnostic tests performed  Treatments  No previous or current treatments  Patient Goals  Patient goals for therapy: independence with ADLs/IADLs, return to sport/leisure activities, increased motion and decreased pain          Objective     Concurrent Complaints  Positive for headaches, nausea/motion sickness and visual change   Negative for night pain, disturbed sleep, dizziness, faints and tinnitus    Postural Observations  Seated posture: fair  Standing posture: fair        Neurological Testing Sensation   Cervical/Thoracic   Left   Intact: light touch    Right   Intact: light touch    Reflexes   Left   Biceps (C5/C6): brisk (3+)  Brachioradialis (C6): brisk (3+)  Triceps (C7): brisk (3+)  Hernandez's reflex: negative    Right   Biceps (C5/C6): brisk (3+)  Brachioradialis (C6): brisk (3+)  Triceps (C7): brisk (3+)  Hernandez's reflex: negative    Active Range of Motion   Cervical/Thoracic Spine       Cervical    Flexion:  Restriction level: minimal  Extension:  WFL  Left lateral flexion: 25 degrees      Right lateral flexion: 25 degrees      Left rotation: 50 degrees Restriction level: moderate  Right rotation: 45 degrees    Restriction level: moderate    Joint Play     Hypomobile: C3, C4, C5, C6, C7, T1 and T2   Mechanical Assessment    Cervical    Seated Protrusion: repeated movements   Pain location: peripheralized  Pain intensity: worse  Seated left rotation: repeated movements  Pain location: peripheralized  Pain intensity: worse  Seated right rotation: repeated movements  Pain location: peripheralized  Pain intensity: worse  Supine retractation: repeated movements  Pain location: centralized  Pain intensity: better  Supine extension: repeated movements  Pain location: peripheralized  Pain intensity: worse    Thoracic      Lumbar      Strength/Myotome Testing   Cervical Spine     Left   Normal strength    Right   Normal strength    Tests   Cervical   Positive Sharp-Davion test   Negative alar ligament test, transverse ligament test and VBI  Left   Negative Spurling's Test A and Spurling's Test B  Right   Negative Spurling's Test A and Spurling's Test B  Left Shoulder   Negative Adson maneuver and ULTT3  Additional Tests Details  (+) suboccipital tightness    (+) VORx1 vertical deficit with saccades  Neuro Exam:     Headaches   Patient reports headaches: Yes                Precautions: Asthma, GERD, B/L laminectomy and discectomy      Manuals 5/2 5/9 5/18 5/23 6/1 6/13       (T1-T4) PA JM  G4 G4 G4 G4 G4       Radial nerve glides L side  G4 G4 G4 G4 G4       R suboccipital release  3'/3' 3'/3' 3'/3' 3'/3' 3'/3'       Re-eval      10'       Neuro Re-Ed                                                                                                        Ther Ex             Supine cervical retractions w/ extension at top of table 2x10 2x10 2x10 2x10 2x10        Supine cervical retraction w/ biofeedback  10 x10" 10 x10" 2x10 x10"         TB rows w/ cervical retraction  BTB  3x10 x2" BTB  3x10 x2" BTB  3x10 x2" BTB  3x10 x2"        Scap retraction w/ ER w/ cervical retraction  OTB  3x10 x2" GTB  3x10 x2" GTB  3x10 x2" GTB  3x10 x2"        Cervical retraction w/ TB    GTB  2x10 x2" GTB  2x10 x2"        Qped cervical retraction + arm lifts     2x10 B/L        Qped cervical retraction     NV GTB  2x10       HEP review      10'       Ther Activity                                       Gait Training                                       Modalities             Cervical traction  NV

## 2022-06-20 ENCOUNTER — TELEPHONE (OUTPATIENT)
Dept: FAMILY MEDICINE CLINIC | Facility: CLINIC | Age: 57
End: 2022-06-20

## 2022-06-20 NOTE — TELEPHONE ENCOUNTER
Pt called 6/16/22 left msg on referral vm asking for an insurance referral for [facility] Gaylyn Headings appt 4/20/23 NPI # [id number] p) [phone] R20 2 I spoke with pt to explain to her I could put a referral in now but will only be good for 90 days 6/20/22-9/1/22 and may need another if not seen by then   She understands Raffy told her if a referral was in they can put her on a cancellation list for sooner appt

## 2022-06-22 ENCOUNTER — APPOINTMENT (OUTPATIENT)
Dept: PHYSICAL THERAPY | Facility: CLINIC | Age: 57
End: 2022-06-22
Payer: COMMERCIAL

## 2022-09-01 ENCOUNTER — OFFICE VISIT (OUTPATIENT)
Dept: FAMILY MEDICINE CLINIC | Facility: CLINIC | Age: 57
End: 2022-09-01
Payer: COMMERCIAL

## 2022-09-01 VITALS
HEART RATE: 115 BPM | DIASTOLIC BLOOD PRESSURE: 88 MMHG | WEIGHT: 135.4 LBS | TEMPERATURE: 98 F | RESPIRATION RATE: 16 BRPM | OXYGEN SATURATION: 97 % | BODY MASS INDEX: 23.99 KG/M2 | HEIGHT: 63 IN | SYSTOLIC BLOOD PRESSURE: 130 MMHG

## 2022-09-01 DIAGNOSIS — R20.2 FACIAL PARESTHESIA: ICD-10-CM

## 2022-09-01 DIAGNOSIS — R20.2 PARESTHESIAS: Primary | ICD-10-CM

## 2022-09-01 PROCEDURE — 99214 OFFICE O/P EST MOD 30 MIN: CPT | Performed by: FAMILY MEDICINE

## 2022-09-01 RX ORDER — PREDNISONE 10 MG/1
TABLET ORAL
Qty: 33 TABLET | Refills: 0 | Status: SHIPPED | OUTPATIENT
Start: 2022-09-01 | End: 2022-10-24

## 2022-09-01 NOTE — PROGRESS NOTES
Assessment/Plan:   1  Facial paresthesia/Paresthesias  Reviewed patient's symptoms today  At this time, is unclear as to exact cause of her facial paresthesias as well as her generalized paresthesias  It appears that she has had the symptoms for a long duration  She has seen multiple providers for this  She is scheduled to see Neurology at the end of this month  It is unclear if her symptoms are currently due to a dental pathology verses trigeminal neuralgia/irritation  At this time, will start treatment with prednisone taper  She is advised to continue with her antibiotic from her dentist   If any symptoms should worsen, she was advised to call or follow up  - predniSONE 10 mg tablet; Take 60mg daily for 3 days, On day day 4 decrease dose by 1 tablet until completed  Dispense: 33 tablet; Refill: 0               There are no diagnoses linked to this encounter  Subjective:       Chief Complaint   Patient presents with    Jaw Pain     R side and radiating numbness into mid face and down into neck       Patient ID: Aden Severin is a 61 y o  female  Oral Pain   This is a new problem  The current episode started more than 1 month ago  The problem occurs daily  The problem has been unchanged  The pain is at a severity of 9/10  The pain is severe  Associated symptoms include facial pain  Pertinent negatives include no difficulty swallowing, fever or sinus pressure  Treatments tried: antibiotics  The treatment provided mild relief  Review of Systems   Constitutional: Negative for activity change, chills, fatigue and fever  HENT: Negative for congestion, ear pain, sinus pressure and sore throat  Facial pain   Eyes: Negative for redness, itching and visual disturbance  Respiratory: Negative for cough and shortness of breath  Cardiovascular: Negative for chest pain and palpitations  Gastrointestinal: Negative for abdominal pain, diarrhea and nausea     Endocrine: Negative for cold intolerance and heat intolerance  Genitourinary: Negative for dysuria, flank pain and frequency  Musculoskeletal: Negative for arthralgias, back pain, gait problem and myalgias  Skin: Negative for color change  Allergic/Immunologic: Negative for environmental allergies  Neurological: Negative for dizziness, numbness and headaches  Psychiatric/Behavioral: Negative for behavioral problems and sleep disturbance  The following portions of the patient's history were reviewed and updated as appropriate : past family history, past medical history, past social history and past surgical history      Current Outpatient Medications:     ALPRAZolam (XANAX) 0 25 mg tablet, Take 1 tablet (0 25 mg total) by mouth daily at bedtime as needed for anxiety, Disp: 30 tablet, Rfl: 0    budesonide-formoterol (SYMBICORT) 80-4 5 MCG/ACT inhaler, Inhale 2 puffs 2 (two) times a day, Disp: , Rfl:     hyoscyamine (ANASPAZ,LEVSIN) 0 125 MG tablet, Take 1 tablet (0 125 mg total) by mouth every 4 (four) hours as needed for cramping (Patient not taking: No sig reported), Disp: 30 tablet, Rfl: 0    levalbuterol (XOPENEX HFA) 45 mcg/act inhaler, Inhale 2 puffs every 4 (four) hours as needed, Disp: , Rfl: 0    lidocaine (XYLOCAINE) 5 % ointment, as needed, Disp: , Rfl:     nitroglycerin (NITROSTAT) 0 4 mg SL tablet, PLACE 1 TABLET (0 6 MG TOTAL) UNDER THE TONGUE EVERY 5 (FIVE) MINUTES AS NEEDED (ESOPHAGEAL SPASM), Disp: , Rfl:     nitroglycerin (NITROSTAT) 0 6 mg SL tablet, Place 1 tablet (0 6 mg total) under the tongue every 5 (five) minutes as needed (esophageal spasm), Disp: 50 tablet, Rfl: 1    omeprazole (PriLOSEC) 20 mg delayed release capsule, Take 20 mg by mouth daily, Disp: , Rfl:          Objective:         Vitals:    09/01/22 1424   BP: 130/88   Pulse: (!) 115   Resp: 16   Temp: 98 °F (36 7 °C)   TempSrc: Tympanic   SpO2: 97%   Weight: 61 4 kg (135 lb 6 4 oz)   Height: 5' 3" (1 6 m)     Physical Exam  Vitals reviewed  Constitutional:       Appearance: She is well-developed  HENT:      Head: Normocephalic and atraumatic  Nose: Nose normal       Mouth/Throat:      Pharynx: No oropharyngeal exudate  Eyes:      General: No scleral icterus  Right eye: No discharge  Left eye: No discharge  Pupils: Pupils are equal, round, and reactive to light  Neck:      Trachea: No tracheal deviation  Cardiovascular:      Rate and Rhythm: Normal rate and regular rhythm  Pulses:           Dorsalis pedis pulses are 2+ on the right side and 2+ on the left side  Posterior tibial pulses are 2+ on the right side and 2+ on the left side  Heart sounds: Normal heart sounds  No murmur heard  No friction rub  No gallop  Pulmonary:      Effort: Pulmonary effort is normal  No respiratory distress  Breath sounds: Normal breath sounds  No wheezing or rales  Abdominal:      General: Bowel sounds are normal  There is no distension  Palpations: Abdomen is soft  Tenderness: There is no abdominal tenderness  There is no guarding or rebound  Musculoskeletal:         General: Normal range of motion  Cervical back: Normal range of motion and neck supple  Lymphadenopathy:      Head:      Right side of head: No submental or submandibular adenopathy  Left side of head: No submental or submandibular adenopathy  Cervical: No cervical adenopathy  Right cervical: No superficial, deep or posterior cervical adenopathy  Left cervical: No superficial, deep or posterior cervical adenopathy  Skin:     General: Skin is warm and dry  Findings: No erythema  Neurological:      Mental Status: She is alert and oriented to person, place, and time  Cranial Nerves: No cranial nerve deficit  Sensory: No sensory deficit  Psychiatric:         Mood and Affect: Mood is not anxious or depressed           Speech: Speech normal          Behavior: Behavior normal  Thought Content:  Thought content normal          Judgment: Judgment normal

## 2022-09-21 ENCOUNTER — OFFICE VISIT (OUTPATIENT)
Dept: NEUROLOGY | Facility: CLINIC | Age: 57
End: 2022-09-21

## 2022-09-21 ENCOUNTER — TELEPHONE (OUTPATIENT)
Dept: NEUROLOGY | Facility: CLINIC | Age: 57
End: 2022-09-21

## 2022-09-21 VITALS
BODY MASS INDEX: 23.74 KG/M2 | SYSTOLIC BLOOD PRESSURE: 151 MMHG | HEIGHT: 63 IN | WEIGHT: 134 LBS | DIASTOLIC BLOOD PRESSURE: 78 MMHG

## 2022-09-21 DIAGNOSIS — R20.2 PARESTHESIAS: ICD-10-CM

## 2022-09-21 NOTE — PROGRESS NOTES
Patient ID: Octavio Ruiz is a 61 y o  female  Assessment/Plan:    Paresthesias  Patient seen for new complaints a 5 month history of numbness and tingling of the tongue, lips, mouth  She tends to notice this if she is fatigued, exercising or her body is stressed  Episodes tend to last 1-2 hours  She does not note any other neurologic complaints other than her dizziness likely from her diagnosis of PPPD which has been improving with therapies as well as intermittent numbness of the 4th and 5th fingers in the bilateral hands with walking  She does not have any difficulty with speech or swallowing during episodes as well  Neuro exam was stable from last visit, with minimal decreased vibratory in the right great toe  She had normal strength, slightly brisk reflexes  She did have labs performed by PCP which were normal other than mildly elevated A1c  Will plan to check additional labs as noted as well as MRI brain to rule out central process given new symptom  We did discuss that numbness and tingling in her hands is likely related to an ulnar neuropathy, we did discuss nerve conduction study however patient declined at this time given her symptoms are minimal     Plan for follow-up in the next few months to review testing  To contact the office sooner with any concerns or worsening symptoms  Diagnoses and all orders for this visit:    Paresthesias  -     Ambulatory Referral to Neurology  -     Sjogren's Antibodies; Future  -     Vitamin B12; Future  -     Vitamin B6; Future  -     Folate; Future  -     MRI brain with and without contrast; Future  -     Sjogren's Antibodies  -     Vitamin B12  -     Vitamin B6  -     Folate           Subjective:    HPI    Octavio Ruiz is a 61 y o  female with PMHx of bilaterally laminectomy and diskectomy 4/21 L4-L5 emergently for cauda equina   She presents today for follow-up visit       Per chart review, Last office visit 12/2021 in which she was seen for imbalance like sensation   She reported a few months following her laminectomy she felt a sensation of the ground moving like Jell-O while walking   Also noted is been the sensation when looking down   Symptoms varied in severity   Also noted that looking at computer and phone would make her symptoms worse  Previously saw ENT and was diagnosed with PPPD (persistent postural perceptual dizziness)      Of note, Sister has been having ataxic symptoms for the last 2-3 years  Diagnosed at [facility], with spinocerebellar ataxia, she also has POTS, HATS  Sister had genetic testing and was negative for any spinocerebellar ataxia genes  No other family members with known neurological history  At last visit, patient was concerned if her dizziness was from another underlying condition such as her sister's SCA, however exam and MRI imaging is not consistent with this   She was to follow-up at [facility] imbalance clinic  Interval history:  She feels her dizziness has been better with doing with different therapies and trying to desensitize herself to activities that would bother her  She is here today to discuss new numbness and tingling  Started about 5 months ago-will feel numbness in the middle of her tongue feel like it would pull down and get tingling as well  She will also feel tingling and numbness in her lips typically unilateral but can vary between left or right, notes upper is more symptomatic  When symptoms are severe she will have numbness of the palate and throat  Seems to occur when she is hot, stressed, heat, fatigued, exercise,  or her body is stressed (lack of food, dehydration)  Episodes last about 1-2 hours  When the numbness and tingling goes away she will have a sensation of bugs crawling in her lips and tongue this lasts for a few hours  This occurs about once a week  In which she had pain in the V1 and V2 distribution on and off for a few days, this has not reoccurred     She denies any trouble with her speech during episodes  No facial symptoms other then 1 month ago she was having   She has a hx of esophageal spasm so she has always had issues with swallowing  She will have episodes about once a week where her food won't go down  She hasn't noted any change in her swallowing during the episodes as she has tried to drink water  No different foods or change in diet  No dietary  No change in taste  No muscle weakness  She does also have tingling in the left arm into the hand, has been working with PT and this is resolved  She does note when she is walking for exercise her hands will go numb L>R in the 4th and 5th fingers  She does have a hx of gestational DM in all 3 of her children, she has been pre diabetic for the past 3 years      Prior workup:  MRI brain 10/2021: No acute intracranial pathology  Tiny right posterior frontal cavernous malformation  Labs: 10/2021 A1c 5 9, TSH normal  6/2022 rheumatoid factor, uric acid, sed rate, CRP, NEY, Lyme all normal, negative       The following portions of the patient's history were reviewed and updated as appropriate: allergies, current medications, past family history, past medical history, past social history, past surgical history and problem list        Objective:    Blood pressure 151/78, height 5' 3" (1 6 m), weight 60 8 kg (134 lb), not currently breastfeeding  Physical Exam  Constitutional:       General: She is awake  Eyes:      General: Lids are normal       Extraocular Movements: Extraocular movements intact  Pupils: Pupils are equal, round, and reactive to light  Neurological:      Mental Status: She is alert  Coordination: Coordination is intact  Romberg sign negative  Deep Tendon Reflexes: Strength normal       Reflex Scores:       Patellar reflexes are 3+ on the right side and 3+ on the left side  Psychiatric:         Speech: Speech normal          Neurological Exam  Mental Status  Awake and alert   Oriented to person, place, time and situation  Speech is normal  Language is fluent with no aphasia  Cranial Nerves  CN II: Visual fields full to confrontation  CN III, IV, VI: Extraocular movements intact bilaterally  Normal lids and orbits bilaterally  Pupils equal round and reactive to light bilaterally  CN V: Facial sensation is normal   CN VII: Full and symmetric facial movement  CN VIII: Hearing is normal   CN IX, X: Palate elevates symmetrically  CN XI: Shoulder shrug strength is normal   CN XII: Tongue midline without atrophy or fasciculations  Motor  Normal muscle bulk throughout  No fasciculations present  Strength is 5/5 throughout all four extremities  Sensory  Light touch is normal in upper and lower extremities  Pinprick is normal in upper and lower extremities  Temperature is normal in upper and lower extremities  Vibration abnormality: Decreased vibratory R big toe, but otherwise normal throughout        Reflexes  Deep tendon reflexes are 2+ and symmetric except as noted  Right                      Left  Patellar                                3+                         3+    Coordination    Finger-to-nose, rapid alternating movements and heel-to-shin normal bilaterally without dysmetria  Gait  Normal casual, toe, heel and tandem gait  Romberg is absent  Very mild sway during Romberg  I have personally reviewed the ROS performed by the MA     ROS:    Review of Systems   Constitutional: Negative  Negative for appetite change and fever  HENT: Negative  Negative for hearing loss, tinnitus, trouble swallowing and voice change  Eyes: Negative  Negative for photophobia and pain  Respiratory: Negative  Negative for shortness of breath  Cardiovascular: Negative  Negative for palpitations  Gastrointestinal: Positive for nausea (flashes of light causes nausea    patient works with flashing lights)  Negative for vomiting  Endocrine: Negative  Negative for cold intolerance  Genitourinary: Negative  Negative for dysuria, frequency and urgency  Musculoskeletal: Negative  Negative for myalgias and neck pain  Skin: Negative  Negative for rash  Neurological: Positive for dizziness (lasting about a year) and numbness (in tongue and lips radiating down neck, patient feels like this occurs when her heart rate is elevated, can stay for a couple hours  Numbness also in both hands more in the left than right and radaites up arms)  Negative for tremors, seizures, syncope, facial asymmetry, speech difficulty, weakness, light-headedness and headaches  Hematological: Negative  Does not bruise/bleed easily  Psychiatric/Behavioral: Negative  Negative for confusion, hallucinations and sleep disturbance  All other systems reviewed and are negative

## 2022-09-21 NOTE — TELEPHONE ENCOUNTER
Patient left  regarding MRI of the brain that was ordered. Patient states she assumes since she was able to schedule imaging she is approved by insurance.    I called patient back, no answer. Left detailed message going over our time frames for imaging ( being scheduled 3 weeks out to have enough time for Pre Auth department to obtain pre cert)    Patient would like a call back once authorization is approved or denied.    She is currently scheduled 10/4/22 at [facility].        # [phone]    Just an Talya Hodgson.

## 2022-09-22 PROBLEM — R20.2 PARESTHESIAS: Status: ACTIVE | Noted: 2022-09-22

## 2022-09-22 NOTE — ASSESSMENT & PLAN NOTE
Patient seen for new complaints a 5 month history of numbness and tingling of the tongue, lips, mouth  She tends to notice this if she is fatigued, exercising or her body is stressed  Episodes tend to last 1-2 hours  She does not note any other neurologic complaints other than her dizziness likely from her diagnosis of PPPD which has been improving with therapies as well as intermittent numbness of the 4th and 5th fingers in the bilateral hands with walking  She does not have any difficulty with speech or swallowing during episodes as well  Neuro exam was stable from last visit, with minimal decreased vibratory in the right great toe  She had normal strength, slightly brisk reflexes  She did have labs performed by PCP which were normal other than mildly elevated A1c  Will plan to check additional labs as noted as well as MRI brain to rule out central process given new symptom  We did discuss that numbness and tingling in her hands is likely related to an ulnar neuropathy, we did discuss nerve conduction study however patient declined at this time given her symptoms are minimal     Plan for follow-up in the next few months to review testing  To contact the office sooner with any concerns or worsening symptoms

## 2022-09-28 LAB
ENA SS-A AB SER-ACNC: <0.2 AI (ref 0–0.9)
ENA SS-B AB SER-ACNC: <0.2 AI (ref 0–0.9)
FOLATE SERPL-MCNC: 9.5 NG/ML
VIT B12 SERPL-MCNC: 229 PG/ML (ref 232–1245)

## 2022-10-03 ENCOUNTER — TELEPHONE (OUTPATIENT)
Dept: NEUROLOGY | Facility: CLINIC | Age: 57
End: 2022-10-03

## 2022-10-03 NOTE — TELEPHONE ENCOUNTER
Patient left vm regarding status of insurance authorization for MRI brain  MRI scheduled for 10/4/2  Patient would like return call at [phone]      Pre-cert team - vangie vences

## 2022-10-03 NOTE — TELEPHONE ENCOUNTER
Called patient in regards to both phone messages received to check status of Auth request    I advised Diana Reed is Pending and if we do get an Auth Approval I will call and let her know but if not then she can reschedule    I advised patient I will call her around 1:00-2:00 the latest and if still Pending then I will call her and she can reschedule    Patient understood and will wait for my call around that time

## 2022-10-03 NOTE — TELEPHONE ENCOUNTER
Patient called on 9/30/2022 to check status of Auth request for Brain MRI scheduled 10/05/2022    Need cosigner from office notes 9/21/2022    Sent a request to Moncho Mathews to advise me of who cosigner will be so I can submit for Auth

## 2022-10-04 ENCOUNTER — HOSPITAL ENCOUNTER (OUTPATIENT)
Dept: MRI IMAGING | Facility: HOSPITAL | Age: 57
Discharge: HOME | End: 2022-10-04
Payer: COMMERCIAL

## 2022-10-04 DIAGNOSIS — R20.2 PARESTHESIAS: ICD-10-CM

## 2022-10-04 LAB — VIT B6 SERPL-MCNC: 7.1 UG/L (ref 3.4–65.2)

## 2022-10-04 PROCEDURE — A9585 GADOBUTROL INJECTION: HCPCS | Performed by: NURSE PRACTITIONER

## 2022-10-04 PROCEDURE — G1004 CDSM NDSC: HCPCS

## 2022-10-04 PROCEDURE — 70553 MRI BRAIN STEM W/O & W/DYE: CPT

## 2022-10-04 RX ADMIN — GADOBUTROL 6 ML: 604.72 INJECTION INTRAVENOUS at 08:45

## 2022-10-12 PROBLEM — N39.0 URINARY TRACT INFECTION WITHOUT HEMATURIA: Status: RESOLVED | Noted: 2021-03-23 | Resolved: 2022-10-12

## 2022-10-13 ENCOUNTER — TELEPHONE (OUTPATIENT)
Dept: NEUROLOGY | Facility: CLINIC | Age: 57
End: 2022-10-13

## 2022-10-13 NOTE — TELEPHONE ENCOUNTER
MRI brain showed nonspecific white matter changes, more likely changes related to small vessel disease and would not cause symptoms  Otherwise MRI was stable  Recommend to replete b12 as previously recommended as that may contribute to symptoms     Written by Eufemia Zavaleta on 10/6/2022 10:53 AM EDT  Seen by patient Saralyn Aschoff on 10/9/2022  9:39 PM

## 2022-10-13 NOTE — TELEPHONE ENCOUNTER
Kathryn Lovell, [address]   10/4/2022  2:24 PM EDT Back to Top        Please contact patient and let her know all labs were normal except her b12 was very low  Would recommend b12 injections weekly for 10 weeks then can switch to oral b12   This can be done through our office or PCP

## 2022-10-24 ENCOUNTER — OFFICE VISIT (OUTPATIENT)
Dept: FAMILY MEDICINE CLINIC | Facility: CLINIC | Age: 57
End: 2022-10-24
Payer: COMMERCIAL

## 2022-10-24 VITALS
HEIGHT: 65 IN | DIASTOLIC BLOOD PRESSURE: 82 MMHG | TEMPERATURE: 99.2 F | BODY MASS INDEX: 22.13 KG/M2 | WEIGHT: 132.8 LBS | OXYGEN SATURATION: 99 % | SYSTOLIC BLOOD PRESSURE: 130 MMHG | HEART RATE: 109 BPM

## 2022-10-24 DIAGNOSIS — Z23 NEED FOR INFLUENZA VACCINATION: ICD-10-CM

## 2022-10-24 DIAGNOSIS — Z00.00 ANNUAL PHYSICAL EXAM: Primary | ICD-10-CM

## 2022-10-24 DIAGNOSIS — E78.2 MIXED HYPERLIPIDEMIA: ICD-10-CM

## 2022-10-24 DIAGNOSIS — R33.9 URINARY RETENTION: ICD-10-CM

## 2022-10-24 DIAGNOSIS — R73.09 ELEVATED GLUCOSE: ICD-10-CM

## 2022-10-24 PROCEDURE — 90682 RIV4 VACC RECOMBINANT DNA IM: CPT | Performed by: FAMILY MEDICINE

## 2022-10-24 PROCEDURE — 96372 THER/PROPH/DIAG INJ SC/IM: CPT | Performed by: FAMILY MEDICINE

## 2022-10-24 PROCEDURE — 99396 PREV VISIT EST AGE 40-64: CPT | Performed by: FAMILY MEDICINE

## 2022-10-24 PROCEDURE — 90471 IMMUNIZATION ADMIN: CPT | Performed by: FAMILY MEDICINE

## 2022-10-24 RX ORDER — ALBUTEROL SULFATE 90 UG/1
AEROSOL, METERED RESPIRATORY (INHALATION)
COMMUNITY
Start: 2022-09-29

## 2022-10-24 RX ADMIN — CYANOCOBALAMIN 1000 MCG: 1000 INJECTION, SOLUTION INTRAMUSCULAR; SUBCUTANEOUS at 16:54

## 2022-10-24 NOTE — PATIENT INSTRUCTIONS

## 2022-10-24 NOTE — PROGRESS NOTES
ADULT ANNUAL [address]  GROUP    NAME: Suki Santos  AGE: 61 y o  SEX: female  : 1965     DATE: 10/24/2022     Assessment and Plan:     Patient was seen for annual physical exam   She has recently recovered from [place]  She has mild residual symptoms including dry cough and fatigue  Otherwise her only other concern is difficulty with urination  She at times has to force urine and has a sense of incomplete emptying  We will refer her to urology for follow-up on that issue  Immunizations updated today with flu shot  She is also replacing a low B12 level which was diagnosed by her neurologist   She will have B12 injections weekly for 10 weeks  She is up-to-date on mammogram and colon screening  Routine screening blood work was ordered  Problem List Items Addressed This Visit     Mixed hyperlipidemia    Relevant Orders    Comprehensive metabolic panel    Lipid Panel with Direct LDL reflex    TSH, 3rd generation      Other Visit Diagnoses     Annual physical exam    -  Primary    Need for influenza vaccination        Relevant Orders    influenza vaccine, quadrivalent, recombinant, PF, 0 5 mL, for patients 18 yr+ (FLUBLOK) (Completed)    Urinary retention        Relevant Orders    Ambulatory Referral to Urology    Elevated glucose        Relevant Orders    Hemoglobin A1C          Immunizations and preventive care screenings were discussed with patient today  Appropriate education was printed on patient's after visit summary  Counseling:  Alcohol/drug use: discussed moderation in alcohol intake, the recommendations for healthy alcohol use, and avoidance of illicit drug use  Dental Health: discussed importance of regular tooth brushing, flossing, and dental visits  Injury prevention: discussed safety/seat belts, safety helmets, smoke detectors, carbon dioxide detectors, and smoking near bedding or upholstery    Exercise: the importance of regular exercise/physical activity was discussed  Recommend exercise 3-5 times per week for at least 30 minutes  Return in 1 year (on 10/24/2023)  Chief Complaint:     Chief Complaint   Patient presents with   • Follow-up     Patient recently had COVID and would like her lungs checked  Also has questions from recent MRI ordered by Neuro  • incomplete bladder emptying     Feels like she needs to strain to get urine out      History of Present Illness:     Adult Annual Physical   Patient here for a comprehensive physical exam  The patient reports Difficulty with urinating  Sense of incomplete emptying and need to force urine       Diet and Physical Activity  Diet/Nutrition: well balanced diet, limited junk food and consuming 3-5 servings of fruits/vegetables daily  Exercise: walking and 5-7 times a week on average  Depression Screening  PHQ-2/9 Depression Screening         General Health  Sleep: gets 7-8 hours of sleep on average  Hearing: normal - bilateral   Vision: goes for regular eye exams, most recent eye exam <1 year ago and wears contacts  Dental: regular dental visits  /GYN Health  Patient is: postmenopausal  Last menstrual period: N/A  Contraceptive method: N/A  Review of Systems:     Review of Systems   Constitutional: Negative  HENT: Negative  Negative for congestion, ear pain, hearing loss, nosebleeds, sore throat and trouble swallowing  Eyes: Negative  Respiratory: Negative for apnea, cough, chest tightness, shortness of breath and wheezing  Cardiovascular: Negative  Gastrointestinal: Negative for abdominal pain, blood in stool, constipation, diarrhea, nausea and vomiting  Endocrine: Negative  Genitourinary: Positive for difficulty urinating  Negative for dysuria, frequency, hematuria and urgency  Musculoskeletal: Negative for arthralgias, joint swelling and myalgias  Skin: Negative for rash     Neurological: Negative for dizziness, syncope, light-headedness, numbness and headaches  Hematological: Negative  Psychiatric/Behavioral: Negative for confusion, dysphoric mood and sleep disturbance  The patient is not nervous/anxious  Past Medical History:     Past Medical History:   Diagnosis Date   • Allergic 1998   • Asthma    • Dysuria 2012   • Esophageal spasm 2012    Resolved:  2017   • GERD (gastroesophageal reflux disease)    • Shingles 2016   • Skin disorder 2013    Resolved:  2017      Past Surgical History:     Past Surgical History:   Procedure Laterality Date   •  SECTION     • LAMINECTOMY Bilateral     L4 and L5   • SPINE SURGERY  21    Bilateral laminectomy and 2 disc repairs   • WISDOM TOOTH EXTRACTION        Social History:     Social History     Socioeconomic History   • Marital status: /Civil Union     Spouse name: None   • Number of children: None   • Years of education: None   • Highest education level: None   Occupational History   • None   Tobacco Use   • Smoking status: Never Smoker   • Smokeless tobacco: Never Used   Vaping Use   • Vaping Use: Never used   Substance and Sexual Activity   • Alcohol use:  Yes     Alcohol/week: 2 0 standard drinks     Types: 1 Glasses of wine, 1 Cans of beer per week     Comment: Socially   • Drug use: No   • Sexual activity: Yes     Partners: Male     Birth control/protection: Post-menopausal   Other Topics Concern   • None   Social History Narrative   • None     Social Determinants of Health     Financial Resource Strain: Not on file   Food Insecurity: Not on file   Transportation Needs: Not on file   Physical Activity: Not on file   Stress: Not on file   Social Connections: Not on file   Intimate Partner Violence: Not on file   Housing Stability: Not on file      Family History:     Family History   Problem Relation Age of Onset   • Diabetes Mother    • Polymyalgia rheumatica Mother    • Arthritis Mother    • Alcohol abuse Father    • Asthma Father    • Thyroid cancer Son 25   • Depression Daughter    • Anxiety disorder Daughter    • Skin cancer Sister 43   • Dementia Maternal Grandmother    • Psychiatric Illness Maternal Grandmother    • Parkinsonism Maternal Grandfather    • No Known Problems Paternal Grandmother    • No Known Problems Paternal Grandfather    • No Known Problems Maternal Aunt    • No Known Problems Paternal Aunt    • Ataxia Sister    • Asthma Son    • Cancer Son    • Completed Suicide  Cousin    • Mental illness Neg Hx    • Substance Abuse Neg Hx       Current Medications:     Current Outpatient Medications   Medication Sig Dispense Refill   • albuterol (PROVENTIL HFA,VENTOLIN HFA) 90 mcg/act inhaler INHALE 2 PUFFS BY MOUTH EVERY 4 HOURS AS NEEDED VIA SPACER     • ALPRAZolam (XANAX) 0 25 mg tablet Take 1 tablet (0 25 mg total) by mouth daily at bedtime as needed for anxiety 30 tablet 0   • budesonide-formoterol (SYMBICORT) 80-4 5 MCG/ACT inhaler Inhale 2 puffs 2 (two) times a day     • lidocaine (XYLOCAINE) 5 % ointment as needed     • nitroglycerin (NITROSTAT) 0 4 mg SL tablet PLACE 1 TABLET (0 6 MG TOTAL) UNDER THE TONGUE EVERY 5 (FIVE) MINUTES AS NEEDED (ESOPHAGEAL SPASM)     • omeprazole (PriLOSEC) 20 mg delayed release capsule Take 20 mg by mouth daily     • hyoscyamine (ANASPAZ,LEVSIN) 0 125 MG tablet Take 1 tablet (0 125 mg total) by mouth every 4 (four) hours as needed for cramping (Patient not taking: No sig reported) 30 tablet 0   • levalbuterol (XOPENEX HFA) 45 mcg/act inhaler Inhale 2 puffs every 4 (four) hours as needed (Patient not taking: Reported on 10/24/2022)  0     Current Facility-Administered Medications   Medication Dose Route Frequency Provider Last Rate Last Admin   • cyanocobalamin injection 1,000 mcg  1,000 mcg Intramuscular Weekly Jerry Mullins DO   1,000 mcg at 10/24/22 0412      Allergies:      Allergies   Allergen Reactions   • Azithromycin Swelling     Other reaction(s): swelling • Bactrim [Sulfamethoxazole-Trimethoprim] Swelling   • Codeine Swelling   • Sulfa Antibiotics Hives   • Sulfasalazine Hives      Physical Exam:     /82 (BP Location: Left arm, Patient Position: Sitting, Cuff Size: Adult)   Pulse (!) 109   Temp 99 2 °F (37 3 °C)   Ht 5' 4 5" (1 638 m)   Wt 60 2 kg (132 lb 12 8 oz)   LMP  (LMP Unknown)   SpO2 99%   BMI 22 44 kg/m²     Physical Exam  Vitals and nursing note reviewed  Constitutional:       Appearance: She is well-developed  HENT:      Head: Normocephalic and atraumatic  Eyes:      Pupils: Pupils are equal, round, and reactive to light  Cardiovascular:      Rate and Rhythm: Normal rate and regular rhythm  Heart sounds: Normal heart sounds  Pulmonary:      Effort: Pulmonary effort is normal       Breath sounds: Normal breath sounds  Abdominal:      General: Bowel sounds are normal       Palpations: Abdomen is soft  Musculoskeletal:         General: Normal range of motion  Cervical back: Normal range of motion and neck supple  Skin:     General: Skin is warm and dry  Capillary Refill: Capillary refill takes less than 2 seconds  Neurological:      Mental Status: She is alert and oriented to person, place, and time  Psychiatric:         Behavior: Behavior normal          Thought Content:  Thought content normal          Judgment: Judgment normal           Meche Owusu, DO  [facility]

## 2022-10-31 ENCOUNTER — CLINICAL SUPPORT (OUTPATIENT)
Dept: FAMILY MEDICINE CLINIC | Facility: CLINIC | Age: 57
End: 2022-10-31

## 2022-10-31 DIAGNOSIS — E53.8 VITAMIN B12 DEFICIENCY: Primary | ICD-10-CM

## 2022-10-31 RX ADMIN — CYANOCOBALAMIN 1000 MCG: 1000 INJECTION, SOLUTION INTRAMUSCULAR; SUBCUTANEOUS at 11:57

## 2022-11-02 ENCOUNTER — TELEPHONE (OUTPATIENT)
Dept: UROLOGY | Facility: AMBULATORY SURGERY CENTER | Age: 57
End: 2022-11-02

## 2022-11-02 LAB
ALBUMIN SERPL-MCNC: 4.5 G/DL (ref 3.8–4.9)
ALBUMIN/GLOB SERPL: 2.5 {RATIO} (ref 1.2–2.2)
ALP SERPL-CCNC: 67 IU/L (ref 44–121)
ALT SERPL-CCNC: 13 IU/L (ref 0–32)
AST SERPL-CCNC: 16 IU/L (ref 0–40)
BILIRUB SERPL-MCNC: 0.4 MG/DL (ref 0–1.2)
BUN SERPL-MCNC: 12 MG/DL (ref 6–24)
BUN/CREAT SERPL: 15 (ref 9–23)
CALCIUM SERPL-MCNC: 9.6 MG/DL (ref 8.7–10.2)
CHLORIDE SERPL-SCNC: 105 MMOL/L (ref 96–106)
CHOLEST SERPL-MCNC: 255 MG/DL (ref 100–199)
CO2 SERPL-SCNC: 25 MMOL/L (ref 20–29)
CREAT SERPL-MCNC: 0.81 MG/DL (ref 0.57–1)
EGFR: 84 ML/MIN/1.73
EST. AVERAGE GLUCOSE BLD GHB EST-MCNC: 120 MG/DL
GLOBULIN SER-MCNC: 1.8 G/DL (ref 1.5–4.5)
GLUCOSE SERPL-MCNC: 99 MG/DL (ref 70–99)
HBA1C MFR BLD: 5.8 % (ref 4.8–5.6)
HDLC SERPL-MCNC: 71 MG/DL
LDLC SERPL CALC-MCNC: 165 MG/DL (ref 0–99)
LDLC/HDLC SERPL: 2.3 RATIO (ref 0–3.2)
POTASSIUM SERPL-SCNC: 5.1 MMOL/L (ref 3.5–5.2)
PROT SERPL-MCNC: 6.3 G/DL (ref 6–8.5)
SL AMB VLDL CHOLESTEROL CALC: 19 MG/DL (ref 5–40)
SODIUM SERPL-SCNC: 142 MMOL/L (ref 134–144)
TRIGL SERPL-MCNC: 108 MG/DL (ref 0–149)
TSH SERPL DL<=0.005 MIU/L-ACNC: 1.37 UIU/ML (ref 0.45–4.5)

## 2022-11-02 NOTE — TELEPHONE ENCOUNTER
Please Triage  New Patient    What is the reason for the patient’s appointment? Patient is being referred for urinary retention  She has been having this issue since before she had a laminectomy in April of 2021  She thought once she had this surgery, she would be better but she is still having this problem  She stated she is not emptying her bladder fully and she will go to the bathroom and have to go again in 5 minutes and a lot more will come out  She said she has days where the stream is normal and other days it trickles out  At night when she lays down, she feels she has to go and nothing comes out  What office location does the patient prefer? [place] or [facility]    Imaging/Lab Results:N/A    Do we accept the patient's insurance or is the patient Self-Pay? Yes, [employer] Provider:  Plan Type/Number:  Member ID#: Has the patient had any previous Urologist(s)? No    Have patient records been requested? If not are records showing in Epic:     Has the patient had any outside testing done? N/a    Does the patient have a personal history of cancer?  No    Patient can be reached at [phone]

## 2022-11-02 NOTE — TELEPHONE ENCOUNTER
Called and spoke with patient, reports having urgency and frequency for about over a year  Reports before her surgery she was leaking and retreating  Pt reports that she goes frequently but feels like she is not completely emptying  Said that she tries to make herself go about every 1 because she does not have the feeling of needing to go  Does sometimes get the sensation that "she has UTI" takes cranberry pills and they seem to help at times  Appt scheduled

## 2022-11-07 ENCOUNTER — CLINICAL SUPPORT (OUTPATIENT)
Dept: FAMILY MEDICINE CLINIC | Facility: CLINIC | Age: 57
End: 2022-11-07

## 2022-11-07 DIAGNOSIS — E53.8 VITAMIN B12 DEFICIENCY: Primary | ICD-10-CM

## 2022-11-07 RX ADMIN — CYANOCOBALAMIN 1000 MCG: 1000 INJECTION, SOLUTION INTRAMUSCULAR; SUBCUTANEOUS at 11:39

## 2022-11-08 ENCOUNTER — TELEPHONE (OUTPATIENT)
Dept: UROLOGY | Facility: AMBULATORY SURGERY CENTER | Age: 57
End: 2022-11-08

## 2022-11-08 NOTE — TELEPHONE ENCOUNTER
Hello,    Patient is scheduled to be seen at [address] [place] [address] Urology (NPI: [id number]) on 11/22, and needs a insurance referral   The diagnosis codes needed for the appointment are R33 9, R35 0, R39 81  The procedure for the visit is [id number], that's everything thank you for your time and help

## 2022-11-08 NOTE — TELEPHONE ENCOUNTER
Just received this in the clerical pool:      Patient is scheduled to be seen at [facility]) Urology (NPI: [id number]) on 11/22, and needs a insurance referral   The diagnosis codes needed for the appointment are R33 9, R35 0, R39 81    [place] [address], that's everything thank you for your time and help    this in clerical pool

## 2022-11-14 ENCOUNTER — CLINICAL SUPPORT (OUTPATIENT)
Dept: FAMILY MEDICINE CLINIC | Facility: CLINIC | Age: 57
End: 2022-11-14

## 2022-11-14 DIAGNOSIS — E53.8 VITAMIN B12 DEFICIENCY: Primary | ICD-10-CM

## 2022-11-14 RX ADMIN — CYANOCOBALAMIN 1000 MCG: 1000 INJECTION, SOLUTION INTRAMUSCULAR; SUBCUTANEOUS at 11:50

## 2022-11-21 ENCOUNTER — CLINICAL SUPPORT (OUTPATIENT)
Dept: FAMILY MEDICINE CLINIC | Facility: CLINIC | Age: 57
End: 2022-11-21

## 2022-11-21 ENCOUNTER — TELEPHONE (OUTPATIENT)
Dept: OBGYN CLINIC | Facility: MEDICAL CENTER | Age: 57
End: 2022-11-21

## 2022-11-21 DIAGNOSIS — E53.8 VITAMIN B12 DEFICIENCY: Primary | ICD-10-CM

## 2022-11-21 RX ADMIN — CYANOCOBALAMIN 1000 MCG: 1000 INJECTION, SOLUTION INTRAMUSCULAR; SUBCUTANEOUS at 11:50

## 2022-11-22 ENCOUNTER — OFFICE VISIT (OUTPATIENT)
Dept: UROLOGY | Facility: CLINIC | Age: 57
End: 2022-11-22

## 2022-11-22 VITALS
HEIGHT: 64 IN | WEIGHT: 135 LBS | OXYGEN SATURATION: 99 % | SYSTOLIC BLOOD PRESSURE: 128 MMHG | DIASTOLIC BLOOD PRESSURE: 80 MMHG | BODY MASS INDEX: 23.05 KG/M2 | HEART RATE: 89 BPM

## 2022-11-22 DIAGNOSIS — R33.9 URINARY RETENTION: ICD-10-CM

## 2022-11-22 DIAGNOSIS — R39.14 FEELING OF INCOMPLETE BLADDER EMPTYING: Primary | ICD-10-CM

## 2022-11-22 PROBLEM — R30.0 DYSURIA: Status: RESOLVED | Noted: 2021-03-23 | Resolved: 2022-11-22

## 2022-11-22 LAB

## 2022-11-22 NOTE — PROGRESS NOTES
Assessment and plan:     Feeling of incomplete bladder emptying  · Increased water intake to at least 64 oz per day  · Avoid bladder irritants  · Referral to pelvic floor physical therapy  · Voiding diary  · Schedule ultrasound kidney bladder with PVR  · Timed voiding  · Follow-up 3 months for recheck          Syble Saint, CRNP    History of Present Illness     Radha Martinez is a 61 y o  new patient who presents for urinary frequency and sensation of incomplete bladder emptying  She feels her stream is weak and she has to strain to urinate  Today she had no trouble because her bladder was full  Denies dysuria  When she gets up to urinate again, she will urinate a small amount extra  She is post menopausal  Denies constipation, has more diarrhea  She started in 2021 just prior to her back issues worsening  She ended up having back surgery for saddle anesthesia  She was having uti symptoms, with normal urine testing and was given antibiotics in 2021  Denies kidney stones  Has 3 lifetime +UTI, usually in the summer  Denies STI hx  She drinks water (40-48 ounces), 1 cup of coffee, 1 iced tea  Occasional alcohol, no smoking  Prior csection, otherwise no GYN surgery  No  surgery  She urinates about 9 times a day  She has more symptoms at night  She also recently started b12 shots for paresthesias  She has a history of asthma, chronic back pain, osteoarthritis, mixed hyperlipidemia, vitamin-D deficiency, paresthesias, anxiety  She had prior history of dysuria dating back to 2012, GERD  She is prediabetic with an A1c of 5 8  She has 1 urine culture on file from March 2021 with no growth  She has gyn exam scheduled for December 2022       Laboratory     Lab Results   Component Value Date    BUN 12 11/01/2022    CREATININE 0 81 11/01/2022       No components found for: GFR    Lab Results   Component Value Date    CALCIUM 9 3 04/06/2021    K 5 1 11/01/2022    CO2 25 11/01/2022     11/01/2022       Lab Results   Component Value Date    WBC 4 2 10/12/2021    HGB 12 8 10/12/2021    HCT 37 6 10/12/2021    MCV 89 10/12/2021     10/12/2021       No results found for: PSA    No results found for this or any previous visit (from the past 1 hour(s))  @RESULT(URINEMICROSCOPIC)@    @RESULT(URINECULTURE)@    Radiology       Review of Systems     Review of Systems   Constitutional: Negative for activity change, appetite change, chills, fatigue, fever and unexpected weight change  HENT: Negative for facial swelling  Eyes: Negative for discharge  Respiratory: Negative  Negative for cough and shortness of breath  Cardiovascular: Negative for chest pain and leg swelling  Gastrointestinal: Positive for diarrhea  Negative for abdominal distention, abdominal pain, constipation, nausea and vomiting  Endocrine: Negative  Genitourinary: Negative  Negative for decreased urine volume, difficulty urinating, dysuria, enuresis, flank pain, frequency, genital sores, hematuria and urgency  Feeling of incomplete bladder emptying   Musculoskeletal: Negative for back pain and myalgias  Skin: Negative for pallor and rash  Allergic/Immunologic: Negative  Negative for immunocompromised state  Neurological: Negative for facial asymmetry and speech difficulty  Psychiatric/Behavioral: Negative for agitation and confusion  Allergies     Allergies   Allergen Reactions   • Azithromycin Swelling     Other reaction(s): swelling   • Bactrim [Sulfamethoxazole-Trimethoprim] Swelling   • Codeine Swelling   • Sulfa Antibiotics Hives   • Sulfasalazine Hives       Physical Exam     Physical Exam  Vitals reviewed  Constitutional:       General: She is not in acute distress  Appearance: Normal appearance  She is normal weight  She is not ill-appearing, toxic-appearing or diaphoretic  HENT:      Head: Normocephalic and atraumatic  Eyes:      General: No scleral icterus    Cardiovascular:      Rate and Rhythm: Normal rate  Pulmonary:      Effort: Pulmonary effort is normal  No respiratory distress  Abdominal:      General: Abdomen is flat  There is no distension  Palpations: Abdomen is soft  Tenderness: There is no abdominal tenderness  There is no right CVA tenderness, left CVA tenderness, guarding or rebound  Musculoskeletal:         General: No swelling  Cervical back: Normal range of motion  Skin:     General: Skin is warm and dry  Coloration: Skin is not jaundiced or pale  Findings: No rash  Neurological:      General: No focal deficit present  Mental Status: She is alert and oriented to person, place, and time  Gait: Gait normal    Psychiatric:         Mood and Affect: Mood normal          Behavior: Behavior normal          Thought Content:  Thought content normal          Judgment: Judgment normal          Vital Signs     Vitals:    11/22/22 1420   BP: 128/80   BP Location: Left arm   Patient Position: Sitting   Pulse: 89   SpO2: 99%   Weight: 61 2 kg (135 lb)   Height: 5' 4" (1 626 m)       Current Medications       Current Outpatient Medications:   •  albuterol (PROVENTIL HFA,VENTOLIN HFA) 90 mcg/act inhaler, INHALE 2 PUFFS BY MOUTH EVERY 4 HOURS AS NEEDED VIA SPACER, Disp: , Rfl:   •  ALPRAZolam (XANAX) 0 25 mg tablet, Take 1 tablet (0 25 mg total) by mouth daily at bedtime as needed for anxiety, Disp: 30 tablet, Rfl: 0  •  budesonide-formoterol (SYMBICORT) 80-4 5 MCG/ACT inhaler, Inhale 2 puffs 2 (two) times a day, Disp: , Rfl:   •  lidocaine (XYLOCAINE) 5 % ointment, as needed, Disp: , Rfl:   •  nitroglycerin (NITROSTAT) 0 4 mg SL tablet, PLACE 1 TABLET (0 6 MG TOTAL) UNDER THE TONGUE EVERY 5 (FIVE) MINUTES AS NEEDED (ESOPHAGEAL SPASM), Disp: , Rfl:   •  omeprazole (PriLOSEC) 20 mg delayed release capsule, Take 20 mg by mouth daily, Disp: , Rfl:   •  hyoscyamine (ANASPAZ,LEVSIN) 0 125 MG tablet, Take 1 tablet (0 125 mg total) by mouth every 4 (four) hours as needed for cramping (Patient not taking: Reported on 2022), Disp: 30 tablet, Rfl: 0  •  levalbuterol (XOPENEX HFA) 45 mcg/act inhaler, Inhale 2 puffs every 4 (four) hours as needed (Patient not taking: Reported on 10/24/2022), Disp: , Rfl: 0    Current Facility-Administered Medications:   •  cyanocobalamin injection 1,000 mcg, 1,000 mcg, Intramuscular, Weekly, Tarik Najjar, DO, 1,000 mcg at 22 1150    Active Problems     Patient Active Problem List   Diagnosis   • Anxiety   • Asthma   • Mixed hyperlipidemia   • Vitamin D deficiency   • Lumbar back pain with radiculopathy affecting right lower extremity   • Peroneal tendinitis   • Sprain of MCL (medial collateral ligament) of knee   • Tremor   • Localized, primary osteoarthritis of hand   • Paresthesias   • Feeling of incomplete bladder emptying       Past Medical History     Past Medical History:   Diagnosis Date   • Allergic 1998   • Asthma    • Dysuria 2012   • Esophageal spasm 2012    Resolved:  2017   • GERD (gastroesophageal reflux disease)    • Shingles 2016   • Skin disorder 2013    Resolved:  2017       Surgical History     Past Surgical History:   Procedure Laterality Date   •  SECTION     • COLONOSCOPY     • LAMINECTOMY Bilateral     L4 and L5   • SPINE SURGERY  21    Bilateral laminectomy and 2 disc repairs   • WISDOM TOOTH EXTRACTION         Family History     Family History   Problem Relation Age of Onset   • Diabetes Mother    • Polymyalgia rheumatica Mother    • Arthritis Mother    • Alcohol abuse Father    • Asthma Father    • Thyroid cancer Son 25   • Depression Daughter    • Anxiety disorder Daughter    • Skin cancer Sister 43   • Dementia Maternal Grandmother    • Psychiatric Illness Maternal Grandmother    • Parkinsonism Maternal Grandfather    • No Known Problems Paternal Grandmother    • No Known Problems Paternal Grandfather    • No Known Problems Maternal Aunt    • No Known Problems Paternal [de-identified]    • Ataxia Sister    • Asthma Son    • Cancer Son    • Completed Suicide  Cousin    • Mental illness Neg Hx    • Substance Abuse Neg Hx        Social History     Social History     Social History     Tobacco Use   Smoking Status Never   Smokeless Tobacco Never       Past Surgical History:   Procedure Laterality Date   •  SECTION     • COLONOSCOPY     • LAMINECTOMY Bilateral     L4 and L5   • SPINE SURGERY  21    Bilateral laminectomy and 2 disc repairs   • WISDOM TOOTH EXTRACTION           The following portions of the patient's history were reviewed and updated as appropriate: allergies, current medications, past family history, past medical history, past social history, past surgical history and problem list    Please note :  Voice dictation software has been used to create this document  There may be inadvertent transcription errors      [id number] [id number] [address] Pat[address]io

## 2022-11-22 NOTE — PATIENT INSTRUCTIONS
BEHAVIORAL THERAPY FOR IMPROVED BLADDER CONTROL      1  Urge Control Techniques       A  Stop whatever you are doing and concentrate on luci your pelvic floor muscles  B   Contract your pelvic floor muscles repetitively (as in your "flick" exercises)  C   Once the urgency has subsided, realize that sometimes the urgency is because you have a             full bladder and have to urinate  Other times the urgency is a false signal and you do not have a full bladder  D  If you have urgency triggered by running water, "key in the door," getting up from a sitting position, etc , contract your pelvic floor muscles prior to       initiating the activity  2   Daily Fluid Intake       A  Avoid drinking too little (less than 3 cups/day) or drinking too much (more than 6             cups/day)  B   Avoid caffeinated beverages  C   If voiding frequently at night, limit your liquid intake after 7 p m  3   Bowel Regularity--Constipation Makes Bladder Symptoms Worse       A  Drink enough liquids, eat plenty of high fiber food  B  Exercise       C  Avoid laxatives and enemas on a regular basis, this decreases the bowel's normal function  4   Voiding Schedules       A  Empty your bladder at 1½ to 2 hour intervals even if you may not have the urge to urinate             at that time  You may gradually increase the time between voidings to 30  minutes, until you can comfortably void every 3 hours  B  If you are voiding more frequently than every 1½ to 2 hours, resist the urge to go  by using urge control techniques (described in 1 )

## 2022-11-22 NOTE — ASSESSMENT & PLAN NOTE
· Increased water intake to at least 64 oz per day  · Avoid bladder irritants  · Referral to pelvic floor physical therapy  · Voiding diary  · Schedule ultrasound kidney bladder with P[facility]  · Timed voiding  · Follow-up 3 months for recheck

## 2022-11-24 LAB
APPEARANCE UR: CLEAR
BACTERIA UR CULT: NORMAL
BACTERIA URNS QL MICRO: NORMAL
BILIRUB UR QL STRIP: NEGATIVE
CASTS URNS QL MICRO: NORMAL /LPF
COLOR UR: YELLOW
EPI CELLS #/AREA URNS HPF: NORMAL /HPF (ref 0–10)
GLUCOSE UR QL: NEGATIVE
HGB UR QL STRIP: NEGATIVE
KETONES UR QL STRIP: NEGATIVE
LEUKOCYTE ESTERASE UR QL STRIP: NEGATIVE
Lab: NO GROWTH
MICRO URNS: NORMAL
MICRO URNS: NORMAL
NITRITE UR QL STRIP: NEGATIVE
PH UR STRIP: 7 [PH] (ref 5–7.5)
PROT UR QL STRIP: NEGATIVE
RBC #/AREA URNS HPF: NORMAL /HPF (ref 0–2)
SP GR UR: 1 (ref 1–1.03)
UROBILINOGEN UR STRIP-ACNC: 0.2 MG/DL (ref 0.2–1)
WBC #/AREA URNS HPF: NORMAL /HPF (ref 0–5)

## 2022-11-28 ENCOUNTER — CLINICAL SUPPORT (OUTPATIENT)
Dept: FAMILY MEDICINE CLINIC | Facility: CLINIC | Age: 57
End: 2022-11-28

## 2022-11-28 DIAGNOSIS — E53.8 VITAMIN B12 DEFICIENCY: Primary | ICD-10-CM

## 2022-11-28 RX ADMIN — CYANOCOBALAMIN 1000 MCG: 1000 INJECTION, SOLUTION INTRAMUSCULAR; SUBCUTANEOUS at 11:50

## 2022-12-05 ENCOUNTER — CLINICAL SUPPORT (OUTPATIENT)
Dept: FAMILY MEDICINE CLINIC | Facility: CLINIC | Age: 57
End: 2022-12-05

## 2022-12-05 DIAGNOSIS — E53.8 VITAMIN B12 DEFICIENCY: Primary | ICD-10-CM

## 2022-12-05 RX ADMIN — CYANOCOBALAMIN 1000 MCG: 1000 INJECTION, SOLUTION INTRAMUSCULAR; SUBCUTANEOUS at 12:12

## 2022-12-12 ENCOUNTER — CLINICAL SUPPORT (OUTPATIENT)
Dept: FAMILY MEDICINE CLINIC | Facility: CLINIC | Age: 57
End: 2022-12-12

## 2022-12-12 DIAGNOSIS — E53.8 VITAMIN B12 DEFICIENCY: Primary | ICD-10-CM

## 2022-12-12 RX ADMIN — CYANOCOBALAMIN 1000 MCG: 1000 INJECTION, SOLUTION INTRAMUSCULAR; SUBCUTANEOUS at 11:48

## 2022-12-15 ENCOUNTER — HOSPITAL ENCOUNTER (OUTPATIENT)
Dept: ULTRASOUND IMAGING | Facility: MEDICAL CENTER | Age: 57
Discharge: HOME | End: 2022-12-15

## 2022-12-15 DIAGNOSIS — R39.14 FEELING OF INCOMPLETE BLADDER EMPTYING: ICD-10-CM

## 2022-12-19 ENCOUNTER — CLINICAL SUPPORT (OUTPATIENT)
Dept: FAMILY MEDICINE CLINIC | Facility: CLINIC | Age: 57
End: 2022-12-19

## 2022-12-19 DIAGNOSIS — E53.8 VITAMIN B12 DEFICIENCY: Primary | ICD-10-CM

## 2022-12-19 RX ORDER — CYANOCOBALAMIN 1000 UG/ML
1000 INJECTION, SOLUTION INTRAMUSCULAR; SUBCUTANEOUS WEEKLY
Status: SHIPPED | OUTPATIENT
Start: 2022-12-19

## 2022-12-19 RX ADMIN — CYANOCOBALAMIN 1000 MCG: 1000 INJECTION, SOLUTION INTRAMUSCULAR; SUBCUTANEOUS at 11:54

## 2022-12-21 ENCOUNTER — OFFICE VISIT (OUTPATIENT)
Dept: NEUROLOGY | Facility: CLINIC | Age: 57
End: 2022-12-21

## 2022-12-21 VITALS
SYSTOLIC BLOOD PRESSURE: 144 MMHG | WEIGHT: 134 LBS | HEART RATE: 60 BPM | DIASTOLIC BLOOD PRESSURE: 82 MMHG | HEIGHT: 64 IN | BODY MASS INDEX: 22.88 KG/M2

## 2022-12-21 DIAGNOSIS — R20.2 PARESTHESIAS: Primary | ICD-10-CM

## 2022-12-21 DIAGNOSIS — Z82.0 FAMILY HISTORY OF EPILEPSY AND OTHER DISEASES OF THE NERVOUS SYSTEM: ICD-10-CM

## 2022-12-21 DIAGNOSIS — M54.12 CERVICAL RADICULOPATHY: ICD-10-CM

## 2022-12-21 DIAGNOSIS — R20.2 PARESTHESIA: ICD-10-CM

## 2022-12-21 DIAGNOSIS — E53.8 B12 DEFICIENCY: ICD-10-CM

## 2022-12-21 NOTE — PROGRESS NOTES
Patient ID: Ila Patel is a 61 y o  female  Assessment/Plan:    Paresthesias  Patient seen forcomplaints a 7-8 month history of numbness and tingling of the tongue, lips, mouth  Work up from last visit with stable brain MRI, she did have b12 deficiency noted on labs that is currently being treated with b12 injections  With injections she has noted some improvement in her symptoms of numbness and tingling in the face  She has noted less frequent episodes of the oral paraesthesias  She does continue to note right sided numbness and tingling on the tongue, soft palate, and throat with moderate physical activity as well as "jostling" of her body such as when riding a bike  She denies any difficulty with speech or swallowing during episodes  Neurologic exam remains unchanged from previous  Given more localized symptoms will repeat brain MRI with thin cuts at the brainstem to assess cranial nerves  Plan:  -complete b12 injections then transition to oral b12 1000 mcg daily  -repeat b12 levels in 2 months  -repeat brain MRI with thin cuts of brain stem  -can consider gabapentin for symptom pending MRI results, patient would like to hold off for now    Plan for follow-up in the next few months to review testing  To contact the office sooner with any concerns or worsening symptoms  Cervical Radiculopathy  Patient with chronic neck pain tingling in the left arm, did have PT in the past which did somewhat assist with symptoms  Given ongoing complaints, brisk reflexes, as well as continue oral paraesthesias will obtain MRI cervical spine        Diagnoses and all orders for this visit:    Paresthesias    Paresthesia  -     Ambulatory Referral to Neurology  -     MRI brain with and without contrast; Future    Family history of epilepsy and other diseases of the nervous system  -     Ambulatory Referral to Neurology    Cervical radiculopathy  -     MRI cervical spine with and without contrast; Future    B12 deficiency  -     Vitamin B12; Future  -     Vitamin B12           Subjective:    HPI    Derrick Alvarado is a 61 y o  female with PMHx of bilaterally laminectomy and diskectomy 4/21 L4-L5 emergently for cauda equina  She presents today for follow-up visit  Per chart review, was seen 12/2021 in which she was seen for imbalance like sensation  She reported a few months following her laminectomy she felt a sensation of the ground moving like Jell-O while walking  Also noted is been the sensation when looking down  Symptoms varied in severity  Also noted that looking at computer and phone would make her symptoms worse  Previously saw ENT and was diagnosed with PPPD (persistent postural perceptual dizziness)      Of note, Sister has been having ataxic symptoms for the last 2-3 years  Diagnosed at [facility], with spinocerebellar ataxia, she also has POTS, HATS  Sister had genetic testing and was negative for any spinocerebellar ataxia genes  No other family members with known neurological history  Per the above evaluation 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  Last office visit 9/2022 in which she had complaints of numbness and tingling of the tongue and lips was to obtain labs and imaging  Interval history:  She did start b12 injections has had 8 weekly injections so far  She did feel that with this pain that starts at the back of her neck and radiates up to her eye and temple did improve with b12 injections  She also felt some sensitivity in her trigeminal nerve did improve as well  She states her hand numbness has also improved  She feels mouth symptoms are less frequent, less severe  She really only notes the symptoms when she is very physically active-power walking, biking  She will notice if she hits a "bump" she will have symptoms  She does not feel headaches with numbness     She will experience numbness mainly on the right side on the back of her tongue and soft palate and into her throat  If very severe she will having tingling in her lips  She states symptoms were more previously on the left  Speech does not seem to be affected  She is able to drink water doing these episodes-feels "different" but she is not choking  She does not feel like her taste was effected-did have COVID 2 months ago and lost some sense of taste  She continues to have some left arm numbness/tingling at times-starts at the shoulder and goes all the way down to the hands  Did have PT with some improvement  Prior workup:  MRI brain 10/2021: No acute intracranial pathology  Tiny right posterior frontal cavernous malformation  MRI brain 10/2022: No acute intracranial pathology  Chronic, nonemergent findings above  Similar, minimal, focal patchy periventricular and subcortical white matter foci of abnormal T2 and FLAIR hyperintensity are nonspecific, but usually secondary to changes of microangiopathy  Labs: 9/2022 sjogrens, b6, folate normal  b12 229  10/2021 A1c 5 9, TSH normal  6/2022 rheumatoid factor, uric acid, sed rate, CRP, NEY, Lyme all normal, negative       Objective:    Blood pressure 144/82, pulse 60, height 5' 4" (1 626 m), weight 60 8 kg (134 lb), not currently breastfeeding  Physical Exam  Constitutional:       General: She is awake  Eyes:      General: Lids are normal       Extraocular Movements: Extraocular movements intact  Pupils: Pupils are equal, round, and reactive to light  Neurological:      Mental Status: She is alert  Motor: Motor strength is normal       Deep Tendon Reflexes:      Reflex Scores:       Bicep reflexes are 2+ on the right side and 2+ on the left side  Brachioradialis reflexes are 2+ on the right side and 2+ on the left side  Patellar reflexes are 3+ on the right side and 3+ on the left side  Achilles reflexes are 2+ on the right side and 2+ on the left side    Psychiatric:         Speech: Speech normal  Neurological Exam  Mental Status  Awake and alert  Oriented to person, place, time and situation  Speech is normal  Language is fluent with no aphasia  Cranial Nerves  CN II: Visual fields full to confrontation  CN III, IV, VI: Extraocular movements intact bilaterally  Normal lids and orbits bilaterally  Pupils equal round and reactive to light bilaterally  CN V: Facial sensation is normal   CN VII: Full and symmetric facial movement  CN VIII: Hearing is normal   CN IX, X: Palate elevates symmetrically  Normal gag reflex  CN XI: Shoulder shrug strength is normal   CN XII: Tongue midline without atrophy or fasciculations  Motor  Normal muscle bulk throughout  No fasciculations present  Strength is 5/5 throughout all four extremities  Sensory  Light touch is normal in upper and lower extremities  Pinprick is normal in upper and lower extremities  Temperature is normal in upper and lower extremities  Vibration abnormality: Decreased vibratory R big toe, but otherwise normal throughout        Reflexes                                            Right                      Left  Brachioradialis                    2+                         2+  Biceps                                 2+                         2+  Patellar                                3+                         3+  Achilles                                2+                         2+  Plantar                           Downgoing                Downgoing    Right pathological reflexes: Isabelle's absent  Ankle clonus absent  Left pathological reflexes: Isabelle's absent  Ankle clonus absent  Coordination  Right: Finger-to-nose normal  Heel-to-shin normal Left: Finger-to-nose normal  Heel-to-shin normal     Gait  Casual gait is normal including stance, stride, and arm swing  Able to rise from chair without using arms  ROS:    Review of Systems   Constitutional: Negative  HENT: Negative  Eyes: Negative      Respiratory: Negative  Cardiovascular: Negative  Gastrointestinal: Negative  Endocrine: Negative  Genitourinary: Negative  Musculoskeletal: Negative  Skin: Negative  Allergic/Immunologic: Negative  Neurological: Positive for numbness  Hematological: Negative  Psychiatric/Behavioral: Negative

## 2022-12-21 NOTE — PATIENT INSTRUCTIONS
Once injections are completed would start oral b12 1000 mcg daily   Repeat b12 levels in 2 months     Pursue cervical spine MRI

## 2022-12-21 NOTE — ASSESSMENT & PLAN NOTE
Patient seen forcomplaints a 7-8 month history of numbness and tingling of the tongue, lips, mouth  Work up from last visit with stable brain MRI, she did have b12 deficiency noted on labs that is currently being treated with b12 injections  With injections she has noted some improvement in her symptoms of numbness and tingling in the face  She has noted less frequent episodes of the oral paraesthesias  She does continue to note right sided numbness and tingling on the tongue, soft palate, and throat with moderate physical activity as well as "jostling" of her body such as when riding a bike  She denies any difficulty with speech or swallowing during episodes  Neurologic exam remains unchanged from previous  Given more localized symptoms will repeat brain MRI with thin cuts at the brainstem to assess cranial nerves  Plan:  -complete b12 injections then transition to oral b12 1000 mcg daily  -repeat b12 levels in 2 months  -repeat brain MRI with thin cuts of brain stem  -can consider gabapentin for symptom pending MRI results, patient would like to hold off for now    Plan for follow-up in the next few months to review testing  To contact the office sooner with any concerns or worsening symptoms

## 2022-12-27 ENCOUNTER — CLINICAL SUPPORT (OUTPATIENT)
Dept: FAMILY MEDICINE CLINIC | Facility: CLINIC | Age: 57
End: 2022-12-27

## 2022-12-27 DIAGNOSIS — E53.8 VITAMIN B12 DEFICIENCY: Primary | ICD-10-CM

## 2022-12-27 RX ADMIN — CYANOCOBALAMIN 1000 MCG: 1000 INJECTION, SOLUTION INTRAMUSCULAR; SUBCUTANEOUS at 12:01

## 2022-12-30 ENCOUNTER — HOSPITAL ENCOUNTER (OUTPATIENT)
Dept: MRI IMAGING | Facility: HOSPITAL | Age: 57
Discharge: HOME | End: 2022-12-30

## 2022-12-30 DIAGNOSIS — M54.12 CERVICAL RADICULOPATHY: ICD-10-CM

## 2022-12-30 RX ADMIN — GADOBUTROL 6 ML: 604.72 INJECTION INTRAVENOUS at 09:41

## 2023-01-04 LAB — VIT B12 SERPL-MCNC: 586 PG/ML (ref 232–1245)

## 2023-01-08 DIAGNOSIS — F41.9 ANXIETY: ICD-10-CM

## 2023-01-09 ENCOUNTER — HOSPITAL ENCOUNTER (OUTPATIENT)
Dept: MRI IMAGING | Facility: HOSPITAL | Age: 58
Discharge: HOME | End: 2023-01-09

## 2023-01-09 DIAGNOSIS — R20.2 PARESTHESIA: ICD-10-CM

## 2023-01-09 RX ORDER — ALPRAZOLAM 0.25 MG/1
0.25 TABLET ORAL
Qty: 30 TABLET | Refills: 0 | Status: SHIPPED | OUTPATIENT
Start: 2023-01-09

## 2023-01-12 ENCOUNTER — TELEPHONE (OUTPATIENT)
Dept: NEUROLOGY | Facility: CLINIC | Age: 58
End: 2023-01-12

## 2023-01-12 DIAGNOSIS — M48.02 CERVICAL STENOSIS OF SPINE: Primary | ICD-10-CM

## 2023-01-12 DIAGNOSIS — M54.12 CERVICAL RADICULOPATHY: ICD-10-CM

## 2023-01-12 NOTE — TELEPHONE ENCOUNTER
----- Message from Eldon Guan, [address] sent at 1/4/2023 12:39 PM EST -----  Please let patient know her Mri showed degenerative changes, protruding disc, some narrowing of the spinal canal does mildly press on the spinal cord at C5-6, as well as narrowing where the nerves exit the spine-most notable at C5-6, L>R   Would suggest referral to neurosurgery for opinion due to the spinal cord narrowing, please let me know if agreeable and I will place order

## 2023-01-12 NOTE — TELEPHONE ENCOUNTER
Spoke with patient regarding MRI results, all questions answered to satisfaction  Incidental findings would not cause symptoms of throat paresthesias  Did again discuss trial of gabapentin for symptoms however patient declined, she will contact for any worsening symptoms  Requested referral for patient ordered, please mail to her

## 2023-01-12 NOTE — TELEPHONE ENCOUNTER
Spoke with patient and went over results of MRI C/S  Patient is agreeable to referral to NeuroSx, specifically Dr Nieves Bennett  Patient also asked if you can place another referral to her Ortho doctor that did her Lumbar Sx back in 2020/2021--Dr Nicolasa Cook @ [facility]  Patient would like to go back there also for an opinion on MRI C/S  Patient also asked if you can review MRI Brain results from 1/9/23, she just viewed them in My Chart and is concerned  Please advise  Thank you

## 2023-01-17 ENCOUNTER — OFFICE VISIT (OUTPATIENT)
Dept: OBGYN CLINIC | Facility: MEDICAL CENTER | Age: 58
End: 2023-01-17

## 2023-01-17 VITALS
HEIGHT: 64 IN | WEIGHT: 134 LBS | DIASTOLIC BLOOD PRESSURE: 80 MMHG | SYSTOLIC BLOOD PRESSURE: 130 MMHG | BODY MASS INDEX: 22.88 KG/M2

## 2023-01-17 DIAGNOSIS — Z01.419 ENCOUNTER FOR WELL WOMAN EXAM WITH ROUTINE GYNECOLOGICAL EXAM: Primary | ICD-10-CM

## 2023-01-17 DIAGNOSIS — Z12.31 ENCOUNTER FOR SCREENING MAMMOGRAM FOR MALIGNANT NEOPLASM OF BREAST: ICD-10-CM

## 2023-01-17 NOTE — PROGRESS NOTES
[facility] [address] [place] [address]    ASSESSMENT/PLAN: Ila Patel is a 61 y o  [id number] who presents for annual gynecologic exam     Encounter for routine gynecologic examination  - Routine well woman exam completed today  - Cervical Cancer Screening: Current ASCCP Guidelines reviewed  Last Pap: 06/14/2017  Next Pap Due: today  - Breast Cancer Screening: Last Mammogram 12/22/2021, mammo ordered  - Colorectal cancer screening was not ordered  Done 2017  - The following were reviewed in today's visit: breast self exam and mammography screening ordered    Additional problems addressed at this visit:  1  Encounter for well woman exam with routine gynecological exam    2  Encounter for screening mammogram for malignant neoplasm of breast  -     Mammo screening bilateral w 3d & cad; Future    CC:  Annual Gynecologic Examination    HPI: Ila Patel is a 61 y o  [id number] who presents for annual gynecologic examination  HPI  For routine annual exam, uses vaginal lidocaine jelly for dyspareunia  Was working well  Has not been available  e does reports pain on sides of breasts  The following portions of the patient's history were reviewed and updated as appropriate: allergies, current medications, past family history, past medical history, obstetric history, gynecologic history, past social history, past surgical history and problem list     Review of Systems   Constitutional: Negative  HENT: Negative  Eyes: Negative  Respiratory: Negative  Cardiovascular: Negative  Gastrointestinal: Negative  Genitourinary: Negative  Musculoskeletal: Negative  All other systems reviewed and are negative  Objective:  /80 (BP Location: Left arm)   Ht 5' 4" (1 626 m)   Wt 60 8 kg (134 lb)   LMP  (LMP Unknown)   BMI 23 00 kg/m²    Physical Exam  Vitals reviewed  Constitutional:       General: She is not in acute distress       Appearance: She is well-developed  HENT:      Head: Normocephalic and atraumatic  Nose: Nose normal    Cardiovascular:      Rate and Rhythm: Normal rate  Pulmonary:      Effort: Pulmonary effort is normal  No respiratory distress  Chest:   Breasts:     Breasts are symmetrical       Right: Normal  No mass, nipple discharge, skin change or tenderness  Left: Normal  No mass, nipple discharge, skin change or tenderness  Abdominal:      General: There is no distension  Palpations: Abdomen is soft  There is no mass  Tenderness: There is no abdominal tenderness  There is no guarding or rebound  Genitourinary:     General: Normal vulva  Exam position: Lithotomy position  Labia:         Right: No lesion  Left: No lesion  Urethra: No prolapse (urethral meatus normal)  Vagina: Normal  No vaginal discharge, erythema or bleeding  Cervix: Normal       Uterus: Normal        Adnexa: Right adnexa normal and left adnexa normal    Musculoskeletal:         General: Normal range of motion  Cervical back: Normal range of motion  Lymphadenopathy:      Upper Body:      Right upper body: No supraclavicular, axillary or pectoral adenopathy  Left upper body: No supraclavicular, axillary or pectoral adenopathy  Lower Body: No right inguinal adenopathy  No left inguinal adenopathy  Skin:     General: Skin is warm and dry  Neurological:      Mental Status: She is alert and oriented to person, place, and time  Psychiatric:         Behavior: Behavior normal          Thought Content:  Thought content normal          Judgment: Judgment normal              Isabella Degroot MD  [facility]  01/17/23 4:05 PM

## 2023-01-18 LAB
HPV HR 12 DNA CVX QL NAA+PROBE: NEGATIVE
HPV16 DNA CVX QL NAA+PROBE: NEGATIVE
HPV18 DNA CVX QL NAA+PROBE: NEGATIVE

## 2023-01-24 LAB
LAB AP GYN PRIMARY INTERPRETATION: NORMAL
Lab: NORMAL

## 2023-02-01 ENCOUNTER — TELEPHONE (OUTPATIENT)
Dept: UROLOGY | Facility: AMBULATORY SURGERY CENTER | Age: 58
End: 2023-02-01

## 2023-02-01 NOTE — TELEPHONE ENCOUNTER
Hello,    The patient is going to be seen at [facility]) Urology (NPI: [id number]) on 2/22, and a insurance referral is needed  The diagnoses needed are R39 14,and R33 9, and R35 0  The procedure code needed is 81668  The current referral on file with [facility] portal expires on 2/15  Thank you for your time and help

## 2023-02-28 ENCOUNTER — HOSPITAL ENCOUNTER (OUTPATIENT)
Dept: MAMMOGRAPHY | Facility: MEDICAL CENTER | Age: 58
Discharge: HOME | End: 2023-02-28

## 2023-02-28 VITALS — BODY MASS INDEX: 22.2 KG/M2 | HEIGHT: 64 IN | WEIGHT: 130 LBS

## 2023-02-28 DIAGNOSIS — Z12.31 ENCOUNTER FOR SCREENING MAMMOGRAM FOR MALIGNANT NEOPLASM OF BREAST: ICD-10-CM

## 2023-03-14 DIAGNOSIS — F41.9 ANXIETY: ICD-10-CM

## 2023-03-17 RX ORDER — ALPRAZOLAM 0.25 MG/1
TABLET ORAL
Qty: 30 TABLET | Refills: 0 | Status: SHIPPED | OUTPATIENT
Start: 2023-03-17

## 2023-03-21 ENCOUNTER — OFFICE VISIT (OUTPATIENT)
Dept: NEUROSURGERY | Facility: CLINIC | Age: 58
End: 2023-03-21

## 2023-03-21 VITALS
BODY MASS INDEX: 22.53 KG/M2 | HEIGHT: 64 IN | RESPIRATION RATE: 16 BRPM | DIASTOLIC BLOOD PRESSURE: 77 MMHG | HEART RATE: 110 BPM | SYSTOLIC BLOOD PRESSURE: 138 MMHG | WEIGHT: 132 LBS | TEMPERATURE: 98.2 F

## 2023-03-21 DIAGNOSIS — M48.02 CERVICAL STENOSIS OF SPINE: ICD-10-CM

## 2023-03-21 DIAGNOSIS — M54.12 CERVICAL RADICULOPATHY: ICD-10-CM

## 2023-03-21 RX ORDER — AMOXICILLIN 500 MG/1
500 CAPSULE ORAL 3 TIMES DAILY
COMMUNITY
Start: 2023-03-17

## 2023-03-21 NOTE — PROGRESS NOTES
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 which can be associated with right-sided headache as well as left-sided sensory phenomenon  An MRI of the cervical spine demonstrates cervical spondylosis which is eccentric to the left  I recommend assessing orthostatic blood pressures on her  I believe that she be a good candidate for cervical arthroplasties at the C5-6 and C6-7 levels  This would eliminate the spine as a contributing cause of her left upper extremity problems  It likely is a contributing cause I just do not know that is the sole cause of her problems  She has failed conservative care including epidural steroids as well as physical therapy  It is possible that the cavernoma is causing some of her symptoms it would be unlikely to produce the symptoms in the precentral sulcus but in the postcentral sulcus it could be a contributing cause to her symptomatology  Because of the changes in the size and the theoretical possibility of association with her complaints I have recommended follow-up MRIs until the cavernoma can be shown to be stable  Return if symptoms worsen or fail to improve  Diagnosis ICD-10-CM Associated Orders   1  Cervical stenosis of spine  M48 02 Ambulatory referral to Neurosurgery      2  Cervical radiculopathy  M54 12 Ambulatory referral to Neurosurgery           Chief Complaint   Follow-up (F/U (NEW PROBLEM - SEE REFERRAL) MRI [facility] 12/30/22 - SECOND OPINION FROM MOMO Davis; PREVIOUSLY SEEN BY [facility] FOR [facility]SPINE)       HPI   Neck Pain   This is a new problem   The current episode started more than 1 year ago  The problem occurs constantly  The problem has been gradually worsening  Pain location: posterior neck, left shoulder, left arm  The symptoms are aggravated by position (leaning forward)  Stiffness is present all day  Associated symptoms include headaches (occipital right side that radiates to the eye), numbness (left arm to fingers), tingling and trouble swallowing (chronic)  Pertinent negatives include no pain with swallowing or weakness  She has tried acetaminophen, ice, home exercises, muscle relaxants, neck support and NSAIDs (PT @ [facility] in 05/2022) for the symptoms  Improvement on treatment: significant relief with PT which lasted for 5 months  She denies hx of any surgical procedures to her cervical spine  She has a habituated head tilt  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  Exercise causes an exacerbation of her symptoms  She complains of right-sided headaches which occur in the right temporal region radiating to the vertex in the occipital region and then to the retro-orbital region only on the right side  Right-sided facial numbness which is intermittent in nature  This is in the V3 distribution but also involves the soft palate  She complains of left upper extremity shoulder and pain  This occurs spontaneously  It also occurs with exercise  It sometimes wakes her from her sleep and she then has difficulty getting back asleep  She also complains of a perceptual abnormality with regards to the ground in front of her at times  This causes a feeling like the environment is waving up and down which is visually disturbing to her  Review of Systems   Constitutional: Positive for activity change (decreased)  HENT: Positive for trouble swallowing (chronic)  Eyes: Negative  Respiratory: Negative  Cardiovascular: Negative  Gastrointestinal: Negative  Endocrine: Negative  Genitourinary: Negative      Musculoskeletal: Positive for gait problem (off balance since ), neck pain (posterior into LUE) and neck stiffness  Skin: Negative  Allergic/Immunologic: Negative  Neurological: Positive for dizziness (since  which is why she went to see a neurologist), tingling, numbness (left arm to fingers) and headaches (occipital right side that radiates to the eye)  Negative for weakness  Hematological: Negative  Psychiatric/Behavioral: Positive for sleep disturbance (due to pain)  All other systems reviewed and are negative  I reviewed the ROS  Vitals:    /77 (BP Location: Right arm, Patient Position: Sitting, Cuff Size: Standard)   Pulse (!) 110   Temp 98 2 °F (36 8 °C) (Temporal)   Resp 16   Ht 5' 4" (1 626 m)   Wt 59 9 kg (132 lb)   LMP  (LMP Unknown)   BMI 22 66 kg/m²     MEDICAL HISTORY  Past Medical History:   Diagnosis Date   • Allergic    • Asthma    • Dysuria 2012   • Esophageal spasm 2012    Resolved:  2017   • GERD (gastroesophageal reflux disease)    • H/O oral surgery    • Migraine    • Shingles 2016   • Skin disorder 2013    Resolved:  2017     Past Surgical History:   Procedure Laterality Date   • BONE GRAFT      In mouth after tooth extraction   •  SECTION     • COLONOSCOPY     • LAMINECTOMY Bilateral     L4 and L5   • MOUTH SURGERY     • SPINE SURGERY  21    Bilateral laminectomy and 2 disc repairs   • TOOTH EXTRACTION     • WISDOM TOOTH EXTRACTION       Social History     Tobacco Use   • Smoking status: Never   • Smokeless tobacco: Never   Vaping Use   • Vaping Use: Never used   Substance Use Topics   • Alcohol use:  Yes     Alcohol/week: 2 0 standard drinks     Types: 1 Glasses of wine, 1 Cans of beer per week     Comment: Socially   • Drug use: Never      Family History   Problem Relation Age of Onset   • Diabetes Mother    • Polymyalgia rheumatica Mother    • Arthritis Mother    • Hypertension Mother    • Alcohol abuse Father    • Asthma Father    • Heart attack Father    • Thyroid cancer Son 25   • Depression Daughter    • Anxiety disorder Daughter    • Skin cancer Sister 43   • Dementia Maternal Grandmother    • Psychiatric Illness Maternal Grandmother    • Parkinsonism Maternal Grandfather    • No Known Problems Paternal Grandmother    • No Known Problems Paternal Grandfather    • No Known Problems Maternal Aunt    • No Known Problems Paternal [de-identified]    • Ataxia Sister    • Asthma Son    • Cancer Son    • Completed Suicide  Cousin    • Mental illness Neg Hx    • Substance Abuse Neg Hx         Current Outpatient Medications:   •  albuterol (PROVENTIL HFA,VENTOLIN HFA) 90 mcg/act inhaler, INHALE 2 PUFFS BY MOUTH EVERY 4 HOURS AS NEEDED VIA SPACER, Disp: , Rfl:   •  ALPRAZolam (XANAX) 0 25 mg tablet, TAKE 1 TABLET BY MOUTH DAILY AT BEDTIME AS NEEDED FOR ANXIETY, Disp: 30 tablet, Rfl: 0  •  amoxicillin (AMOXIL) 500 mg capsule, Take 500 mg by mouth 3 (three) times a day, Disp: , Rfl:   •  budesonide-formoterol (SYMBICORT) 80-4 5 MCG/ACT inhaler, Inhale 2 puffs 2 (two) times a day, Disp: , Rfl:   •  hyoscyamine (ANASPAZ,LEVSIN) 0 125 MG tablet, Take 1 tablet (0 125 mg total) by mouth every 4 (four) hours as needed for cramping, Disp: 30 tablet, Rfl: 0  •  levalbuterol (XOPENEX HFA) 45 mcg/act inhaler, Inhale 2 puffs every 4 (four) hours as needed, Disp: , Rfl: 0  •  lidocaine (XYLOCAINE) 5 % ointment, Apply 1 application topically as needed, Disp: , Rfl:   •  nitroglycerin (NITROSTAT) 0 4 mg [facility] tablet, PLACE 1 TABLET (0 6 MG TOTAL) UNDER THE TONGUE EVERY 5 (FIVE) MINUTES AS NEEDED (ESOPHAGEAL SPASM), Disp: , Rfl:   •  omeprazole (PriLOSEC) 20 mg delayed release capsule, Take 20 mg by mouth daily, Disp: , Rfl:     Current Facility-Administered Medications:   •  cyanocobalamin injection 1,000 mcg, 1,000 mcg, Intramuscular, Weekly, Mary Axon, DO, 1,000 mcg at 12/27/22 1201     Allergies   Allergen Reactions   • Azithromycin Swelling     Other reaction(s): swelling   • Bactrim [Sulfamethoxazole-Trimethoprim] Swelling   • Codeine Swelling   • Sulfa Antibiotics Hives   • Sulfasalazine Hives        The following portions of the patient's history were updated by MA and reviewed by MD: allergies, current medications, past family history, past medical history, past social history, past surgical history and problem list     Physical Exam  Vitals and nursing note reviewed  Constitutional:       General: She is not in acute distress  Appearance: Normal appearance  She is not ill-appearing, toxic-appearing or diaphoretic  HENT:      Head: Normocephalic  Nose: Nose normal    Eyes:      Extraocular Movements: Extraocular movements intact  Pupils: Pupils are equal, round, and reactive to light  Musculoskeletal:         General: No swelling, tenderness, deformity or signs of injury  Normal range of motion  Cervical back: Normal range of motion and neck supple  No rigidity  Right lower leg: No edema  Left lower leg: No edema  Lymphadenopathy:      Cervical: No cervical adenopathy  Skin:     General: Skin is warm and dry  Coloration: Skin is not jaundiced  Findings: No erythema or rash  Neurological:      General: No focal deficit present  Mental Status: She is alert and oriented to person, place, and time  Cranial Nerves: No cranial nerve deficit  Sensory: No sensory deficit  Motor: No weakness  Coordination: Coordination normal       Gait: Gait normal       Deep Tendon Reflexes: Reflexes normal    Psychiatric:         Mood and Affect: Mood normal          Behavior: Behavior normal          Thought Content: Thought content normal          Judgment: Judgment normal            RESULTS/DATA  I have personally reviewed pertinent films in PACS     MRI of the cervical spine demonstrates foraminal stenosis    The initial images of C6-7 which demonstrates left-sided neuroforaminal narrowing which is caused by a posterior projecting osteophyte eccentric towards the left side  This likely is at least in part due to her habituation of neck movement  At the C5-6 level there is also bilateral neuroforaminal narrowing however this is more severe on the left than on the right  An MRI of the brain is carefully reviewed  This demonstrates a T2*abnormality with little change on T1 and T2 all of which are supportive of the diagnosis of a cavernoma (cavernous angioma)  This is larger than on previous studies when they are compared as best this is possible

## 2023-04-27 ENCOUNTER — TELEPHONE (OUTPATIENT)
Dept: NEUROLOGY | Facility: CLINIC | Age: 58
End: 2023-04-27

## 2023-04-27 NOTE — TELEPHONE ENCOUNTER
Pt called to reschedule missed appt from 4-26-23  I offered her 5-3-23 at 1 am with Mahesh Urbano in [facility] and she accepted

## 2023-05-02 NOTE — TELEPHONE ENCOUNTER
Patient left voicemail asking if referral is needed for 5/3/2023 appt  Spoke with [facility] who stated referral is in fact needed  She will reach out to PCP office to request     Call returned to patient  Advised of previous  Verbalized understanding

## 2023-05-03 ENCOUNTER — OFFICE VISIT (OUTPATIENT)
Dept: NEUROLOGY | Facility: CLINIC | Age: 58
End: 2023-05-03

## 2023-05-03 VITALS
BODY MASS INDEX: 23.29 KG/M2 | HEART RATE: 118 BPM | SYSTOLIC BLOOD PRESSURE: 134 MMHG | DIASTOLIC BLOOD PRESSURE: 65 MMHG | TEMPERATURE: 97.6 F | WEIGHT: 135.7 LBS

## 2023-05-03 DIAGNOSIS — R20.2 PARESTHESIA OF SKIN: ICD-10-CM

## 2023-05-03 DIAGNOSIS — M54.12 RADICULOPATHY, CERVICAL REGION: ICD-10-CM

## 2023-05-03 DIAGNOSIS — M54.12 CERVICAL RADICULOPATHY: ICD-10-CM

## 2023-05-03 DIAGNOSIS — G44.86 CERVICOGENIC HEADACHE: ICD-10-CM

## 2023-05-03 DIAGNOSIS — Q28.3 CAVERNOUS MALFORMATION: ICD-10-CM

## 2023-05-03 DIAGNOSIS — R20.2 PARESTHESIAS: Primary | ICD-10-CM

## 2023-05-03 RX ORDER — CYCLOBENZAPRINE HCL 10 MG
10 TABLET ORAL 3 TIMES DAILY PRN
Qty: 30 TABLET | Refills: 0 | Status: SHIPPED | OUTPATIENT
Start: 2023-05-03

## 2023-05-03 RX ORDER — MECOBALAMIN 5000 MCG
5000 TABLET,DISINTEGRATING ORAL DAILY
COMMUNITY

## 2023-05-03 NOTE — PROGRESS NOTES
Patient ID: Anuradha Vigil is a 61 y o  female  Assessment/Plan:    Paresthesias  Patient seen for complaints of numbness and tingling of the tongue, lips, mouth-did improve with b12 supplementation  She continues to note episodes paresthesias in the tongue and on the front with high intensity exercise on the right side  We did discuss considering the use of gabapentin for symptoms in which she declined for now  She will continue B12 supplementation  As part of her work-up she did have repeat MRI brain which did show a 0 3 cm cerebral cavernous malformation in right posterior frontal lobe that had slightly increased in size from last MRI  she did follow-up with neurosurgery who recommended surveillance imaging  We will plan to repeat MRI brain in the next 3 months prior to her next office visit to  assess for stability  She denies any difficulty swallowing or with speech during episodes, however she does feel an odd sensation at the back of her throat when she eats  She does have a history of esophageal spasm  I did recommend she follow-up with GI for possible endoscopy given her ongoing symptoms  Plan for follow up in 6 months  To contact the office sooner with any concerns or worsening symptoms  Cervicogenic headache  Patient with symptoms of right-sided neck pain and headache that radiates to her right eye once every few months, she does have symptoms of photophobia and vertigo with this as well  She does have underlying neck muscle spasm  She will consider the use of magnesium at bedtime, can utilize muscle relaxer as needed for neck pain/headaches, recommend home exercise program for neck pain  Given occasional symptoms do not recommend any further preventative measures      She did have MRI cspine performed that did show cervical spondylosis and some mild spinal stenosis and for minimal narrowing, she did see neurosurgery however does not want to undergo surgical intervention at this time  Diagnoses and all orders for this visit:    Paresthesias    Radiculopathy, cervical region  -     Ambulatory Referral to Neurology  -     cyclobenzaprine (FLEXERIL) 10 mg tablet; Take 1 tablet (10 mg total) by mouth 3 (three) times a day as needed for muscle spasms    Paresthesia of skin  -     Ambulatory Referral to Neurology  -     Cancel: MRI brain IAC wo and w contrast; Future  -     MRI brain IAC wo and w contrast; Future    Cavernous malformation  -     Cancel: MRI brain IAC wo and w contrast; Future  -     MRI brain IAC wo and w contrast; Future    Cervical radiculopathy    Cervicogenic headache    Other orders  -     Methylcobalamin (B-12) 5000 MCG TBDP; Take 5,000 mcg by mouth in the morning           Subjective:    HPI    Viola Andrews is a 61 y o  female with PMHx of bilaterally laminectomy and diskectomy 4/21 L4-L5 emergently for cauda equina  She presents today for follow-up visit  Per chart review, was seen 12/2021 in which she was seen for imbalance like sensation  She reported a few months following her laminectomy she felt a sensation of the ground moving like Jell-O while walking  Also noted is been the sensation when looking down  Symptoms varied in severity  Also noted that looking at computer and phone would make her symptoms worse  Previously saw ENT and was diagnosed with PPPD (persistent postural perceptual dizziness)      Of note, Sister has been having ataxic symptoms for the last 2-3 years  Diagnosed at [facility], with spinocerebellar ataxia, she also has POTS, HATS  Sister had genetic testing and was negative for any spinocerebellar ataxia genes  No other family members with known neurological history  Per the above evaluation 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  More recently she noted paresthesias of the tongue lips and mouth in which repeat brain MRI was ordered, she did have low B12 in which she was started on supplementation       Last office visit 12/2022 in which she was to obtain repeat brain MRI  Interval history:    She did see neurosurgery regarding cervical spine in which surgery is being considered, she is holding off on these as she finds symptoms are tolerable  In regards to her cavernoma found on brain MRI repeat MRIs were recommended  She is to manage her BP better-she has been taking BP medication at home and her BP is lower  She also has eliminated some stressors which thinks thinks may have helped as well  She feels symptoms of numbness and tingling in the hands are better, she will only feel symptoms if sh has neck pain or does a lot of heavy lifting  No UE weakness  She feels numbness and tingling in the tongue and down to the throat when she does a high intensity exercise  She does feel symptoms on the right side only, no trouble swallowing or with speech  Facial numbness did improve with b12 supplementation  When she does swallow she does feel like something is in the back of her throat, she does have a history of esophageal spasm  She continues to follow with [facility] for her chronic dizziness  She reports headaches from the right neck into the back of the heae and into the right eye  She will photophobia with this as well  She will get her symptoms of vertigo as well  Occurs about once every 3 months  She does feel a lot of right sided neck tension when this occurs  Prior workup:  MRI brain 10/2021: No acute intracranial pathology  Tiny right posterior frontal cavernous malformation  MRI brain 10/2022: No acute intracranial pathology  Chronic, nonemergent findings above  Similar, minimal, focal patchy periventricular and subcortical white matter foci of abnormal T2 and FLAIR hyperintensity are nonspecific, but usually secondary to changes of microangiopathy      MRI brain 1/2023:    - No acute intracranial abnormality      - Small prominent extra-axial space in inferior aspect of right cerebellopontine angle near right jugular foramen, could represent small arachnoid cyst      - 0 3 cm cerebral cavernous malformation in right posterior frontal lobe (lateral aspect of precentral gyrus), more pronounced than prior exam     MRI cervical spine 12/2022: Cervical spondylosis associated with some spinal canal and neural foraminal narrowing most notably at the C5-C6 level  Labs: 9/2022 sjogrens, b6, folate normal  b12 229  10/2021 A1c 5 9, TSH normal  6/2022 rheumatoid factor, uric acid, sed rate, CRP, NEY, Lyme all normal, negative     The following portions of the patient's history were reviewed and updated as appropriate: allergies, current medications, past family history, past medical history, past social history, past surgical history and problem list        Objective:    Blood pressure 134/65, pulse (!) 118, temperature 97 6 °F (36 4 °C), temperature source Temporal, weight 61 6 kg (135 lb 11 2 oz), not currently breastfeeding  Physical Exam  Constitutional:       General: She is awake  Eyes:      General: Lids are normal       Extraocular Movements: Extraocular movements intact  Pupils: Pupils are equal, round, and reactive to light  Neurological:      Mental Status: She is alert  Motor: Motor strength is normal       Deep Tendon Reflexes:      Reflex Scores:       Bicep reflexes are 2+ on the right side and 2+ on the left side  Brachioradialis reflexes are 2+ on the right side and 2+ on the left side  Patellar reflexes are 3+ on the right side and 3+ on the left side  Achilles reflexes are 2+ on the right side and 2+ on the left side  Psychiatric:         Speech: Speech normal          Neurological Exam  Mental Status  Awake and alert  Oriented to person, place, time and situation  Speech is normal  Language is fluent with no aphasia  Cranial Nerves  CN II: Visual fields full to confrontation  CN III, IV, VI: Extraocular movements intact bilaterally  Normal lids and orbits bilaterally  Pupils equal round and reactive to light bilaterally  CN V: Facial sensation is normal   CN VII: Full and symmetric facial movement  CN VIII: Hearing is normal   CN IX, X: Palate elevates symmetrically  Normal gag reflex  CN XI: Shoulder shrug strength is normal   CN XII: Tongue midline without atrophy or fasciculations  Motor  Normal muscle bulk throughout  No fasciculations present  Strength is 5/5 throughout all four extremities  Muscle tension and hypertrophy of the right sided cervical spine muscles  Sensory  Light touch is normal in upper and lower extremities  Pinprick is normal in upper and lower extremities  Temperature is normal in upper and lower extremities  Vibration abnormality: Decreased vibratory R big toe, but otherwise normal throughout        Reflexes                                            Right                      Left  Brachioradialis                    2+                         2+  Biceps                                 2+                         2+  Patellar                                3+                         3+  Achilles                                2+                         2+  Plantar                           Downgoing                Downgoing    Right pathological reflexes: Isabelle's absent  Ankle clonus absent  Left pathological reflexes: Isabelle's absent  Ankle clonus absent  Coordination  Right: Finger-to-nose normal  Heel-to-shin normal Left: Finger-to-nose normal  Heel-to-shin normal     Gait  Casual gait is normal including stance, stride, and arm swing  Able to rise from chair without using arms  I have personally reviewed the ROS performed by the MA     ROS:    Review of Systems   Constitutional: Negative  Negative for appetite change and fever  HENT: Negative  Negative for hearing loss, tinnitus, trouble swallowing and voice change      Eyes: Positive for photophobia, pain and visual disturbance (depth of field issues)  Respiratory: Negative  Negative for shortness of breath  Cardiovascular: Negative  Negative for palpitations  Gastrointestinal: Negative  Negative for nausea and vomiting  Endocrine: Negative  Negative for cold intolerance  Genitourinary: Negative  Negative for dysuria, frequency and urgency  Musculoskeletal: Positive for gait problem (balance issues) and neck pain  Negative for myalgias  Skin: Negative  Negative for rash  Allergic/Immunologic: Negative  Neurological: Positive for dizziness, light-headedness, numbness (tingling in mouth/throat) and headaches  Negative for tremors, seizures, syncope, facial asymmetry, speech difficulty and weakness  Hematological: Negative  Does not bruise/bleed easily  Psychiatric/Behavioral: Negative  Negative for confusion, hallucinations and sleep disturbance  All other systems reviewed and are negative

## 2023-05-03 NOTE — PATIENT INSTRUCTIONS
Repeat MRI brain 2 weeks prior to next visit  Can try muscle relaxer as needed for neck pain/headaches, recommend gentle stretching  Can trial magnesium 400 mg at bedtime    Could consider gabapentin for numbness and tingling if worsens

## 2023-05-08 NOTE — ASSESSMENT & PLAN NOTE
Patient seen for complaints of numbness and tingling of the tongue, lips, mouth-did improve with b12 supplementation  She continues to note episodes paresthesias in the tongue and on the front with high intensity exercise on the right side  We did discuss considering the use of gabapentin for symptoms in which she declined for now  She will continue B12 supplementation  As part of her work-up she did have repeat MRI brain which did show a 0 3 cm cerebral cavernous malformation in right posterior frontal lobe that had slightly increased in size from last MRI  she did follow-up with neurosurgery who recommended surveillance imaging  We will plan to repeat MRI brain in the next 3 months prior to her next office visit to  assess for stability  She denies any difficulty swallowing or with speech during episodes, however she does feel an odd sensation at the back of her throat when she eats  She does have a history of esophageal spasm  I did recommend she follow-up with GI for possible endoscopy given her ongoing symptoms  Plan for follow up in 6 months  To contact the office sooner with any concerns or worsening symptoms

## 2023-05-12 PROBLEM — G44.86 CERVICOGENIC HEADACHE: Status: ACTIVE | Noted: 2023-05-12

## 2023-05-12 NOTE — ASSESSMENT & PLAN NOTE
Patient with symptoms of right-sided neck pain and headache that radiates to her right eye once every few months, she does have symptoms of photophobia and vertigo with this as well  She does have underlying neck muscle spasm  She will consider the use of magnesium at bedtime, can utilize muscle relaxer as needed for neck pain/headaches, recommend home exercise program for neck pain  Given occasional symptoms do not recommend any further preventative measures  She did have MRI cspine performed that did show cervical spondylosis and some mild spinal stenosis and for minimal narrowing, she did see neurosurgery however does not want to undergo surgical intervention at this time

## 2023-05-22 ENCOUNTER — CLINICAL SUPPORT (OUTPATIENT)
Dept: FAMILY MEDICINE CLINIC | Facility: CLINIC | Age: 58
End: 2023-05-22

## 2023-05-22 DIAGNOSIS — Z23 NEED FOR VACCINATION: Primary | ICD-10-CM

## 2023-08-21 DIAGNOSIS — K58.1 IRRITABLE BOWEL SYNDROME WITH CONSTIPATION: ICD-10-CM

## 2023-08-21 RX ORDER — HYOSCYAMINE SULFATE 0.125 MG
0.12 TABLET ORAL EVERY 4 HOURS PRN
Qty: 30 TABLET | Refills: 0 | Status: SHIPPED | OUTPATIENT
Start: 2023-08-21

## 2023-08-23 ENCOUNTER — HOSPITAL ENCOUNTER (OUTPATIENT)
Dept: MRI IMAGING | Facility: HOSPITAL | Age: 58
Discharge: HOME | End: 2023-08-23
Payer: COMMERCIAL

## 2023-08-23 DIAGNOSIS — Q28.3 CAVERNOUS MALFORMATION: ICD-10-CM

## 2023-08-23 DIAGNOSIS — R20.2 PARESTHESIA OF SKIN: ICD-10-CM

## 2023-08-23 PROCEDURE — G1004 CDSM NDSC: HCPCS

## 2023-08-23 PROCEDURE — 70553 MRI BRAIN STEM W/O & W/DYE: CPT

## 2023-08-23 PROCEDURE — A9585 GADOBUTROL INJECTION: HCPCS | Performed by: NURSE PRACTITIONER

## 2023-08-23 RX ORDER — GADOBUTROL 604.72 MG/ML
6 INJECTION INTRAVENOUS
Status: COMPLETED | OUTPATIENT
Start: 2023-08-23 | End: 2023-08-23

## 2023-08-23 RX ADMIN — GADOBUTROL 6 ML: 604.72 INJECTION INTRAVENOUS at 09:20

## 2023-09-21 ENCOUNTER — OFFICE VISIT (OUTPATIENT)
Dept: FAMILY MEDICINE CLINIC | Facility: CLINIC | Age: 58
End: 2023-09-21
Payer: COMMERCIAL

## 2023-09-21 VITALS
HEIGHT: 64 IN | DIASTOLIC BLOOD PRESSURE: 80 MMHG | BODY MASS INDEX: 23.05 KG/M2 | SYSTOLIC BLOOD PRESSURE: 130 MMHG | WEIGHT: 135 LBS | OXYGEN SATURATION: 99 % | TEMPERATURE: 98.2 F | HEART RATE: 98 BPM

## 2023-09-21 DIAGNOSIS — M25.562 ACUTE PAIN OF LEFT KNEE: Primary | ICD-10-CM

## 2023-09-21 DIAGNOSIS — Z23 NEED FOR INFLUENZA VACCINATION: ICD-10-CM

## 2023-09-21 DIAGNOSIS — M79.662 PAIN OF LEFT CALF: ICD-10-CM

## 2023-09-21 DIAGNOSIS — L03.032 PARONYCHIA OF SECOND TOE OF LEFT FOOT: ICD-10-CM

## 2023-09-21 PROCEDURE — 90686 IIV4 VACC NO PRSV 0.5 ML IM: CPT | Performed by: FAMILY MEDICINE

## 2023-09-21 PROCEDURE — 90471 IMMUNIZATION ADMIN: CPT | Performed by: FAMILY MEDICINE

## 2023-09-21 PROCEDURE — 99213 OFFICE O/P EST LOW 20 MIN: CPT | Performed by: FAMILY MEDICINE

## 2023-09-21 RX ORDER — NICOTINE POLACRILEX 2 MG
5000 LOZENGE BUCCAL DAILY
COMMUNITY

## 2023-09-21 RX ORDER — CEPHALEXIN 500 MG/1
500 CAPSULE ORAL EVERY 12 HOURS SCHEDULED
Qty: 14 CAPSULE | Refills: 0 | Status: SHIPPED | OUTPATIENT
Start: 2023-09-21 | End: 2023-09-28

## 2023-09-21 NOTE — PROGRESS NOTES
Name: Jaja Javed      : 1965      MRN: [id number]  Encounter Provider: Tea Osullivan DO  Encounter Date: 2023   Encounter department: [id number]    Assessment & Plan   Patient with left knee pain. This pain is acute and has worsened with lots of walking. Patient was in [facility] - normally walks four miles a  Day and was walking much more. She has had to stop walking right now. She also has a history of chronic knee isues. [facility] .  Dr. Reed Dodd. She also has pain in her calf. There is no harness, erythema, redness. She also has a paronychia of the second toe of the left foot. I could not express any drainage. 1. Acute pain of left knee  -     Ambulatory Referral to Orthopedic Surgery; Future    2. Pain of left calf  -     US extremity soft tissue; Future; Expected date: 2023    3. Need for influenza vaccination  -     FLUZONE: influenza vaccine, quadrivalent, 0.5 mL    4. Paronychia of second toe of left foot  -     cephalexin (KEFLEX) 500 mg capsule; Take 1 capsule (500 mg total) by mouth every 12 (twelve) hours for 7 days    Because of the pain I the soft tissue of the calf, I have ordered a soft tissue US. In the meantime, she will make an appointment to see Otis Dodd about the acute lateral pain of the knee on top of the chronic knee issue. I have prescribed Cephalexin for paronychia. Subjective      Chief Complaint   Patient presents with   • Pain     Left knee pain, started 3 weeks ago. Pain become really worse about 5 fays ago. Hurts more to walk. Patient rates pain at a 8/9 when it is really painful. About a 4  when she has been sitting. While sitting, pain lessens and there is also pain in that calf muscle. Pain in knee (left knee). She does walk a lot. Had been having throbbing pain in left calf - wakes her up. She is not walking for exercise. Pain in knee is lateral. Feels cracking.     Review of Systems   Constitutional: Negative for chills and fever. Musculoskeletal: Positive for gait problem and joint swelling. Arthralgias: knee and calf - left. Skin: Positive for wound (left second toe). Negative for rash. Current Outpatient Medications on File Prior to Visit   Medication Sig   • albuterol (PROVENTIL HFA,VENTOLIN HFA) 90 mcg/act inhaler INHALE 2 PUFFS BY MOUTH EVERY 4 HOURS AS NEEDED VIA SPACER   • ALPRAZolam (XANAX) 0.25 mg tablet TAKE 1 TABLET BY MOUTH DAILY AT BEDTIME AS NEEDED FOR ANXIETY   • budesonide-formoterol (SYMBICORT) 80-4.5 MCG/ACT inhaler Inhale 2 puffs 2 (two) times a day   • Cyanocobalamin (B-12) 5000 MCG SUBL Place 5,000 mcg under the tongue daily   • cyclobenzaprine (FLEXERIL) 10 mg tablet Take 1 tablet (10 mg total) by mouth 3 (three) times a day as needed for muscle spasms   • hyoscyamine (ANASPAZ,LEVSIN) 0.125 MG tablet Take 1 tablet (0.125 mg total) by mouth every 4 (four) hours as needed for cramping   • levalbuterol (XOPENEX HFA) 45 mcg/act inhaler Inhale 2 puffs every 4 (four) hours as needed   • lidocaine (XYLOCAINE) 5 % ointment Apply 1 application topically as needed   • nitroglycerin (NITROSTAT) 0.4 mg SL tablet PLACE 1 TABLET (0.6 MG TOTAL) UNDER THE TONGUE EVERY 5 (FIVE) MINUTES AS NEEDED (ESOPHAGEAL SPASM)   • omeprazole (PriLOSEC) 20 mg delayed release capsule Take 20 mg by mouth daily   • Methylcobalamin (B-12) 5000 MCG TBDP Take 5,000 mcg by mouth in the morning (Patient not taking: Reported on 9/21/2023)       Objective     /80 (BP Location: Left arm, Patient Position: Sitting)   Pulse 98   Temp 98.2 °F (36.8 °C) (Tympanic)   Ht 5' 4" (1.626 m)   Wt 61.2 kg (135 lb)   LMP  (LMP Unknown)   SpO2 99%   BMI 23.17 kg/m²     Physical Exam  Vitals and nursing note reviewed. Constitutional:       General: She is not in acute distress. HENT:      Head: Normocephalic. Musculoskeletal:         General: Tenderness (lateral left knee) present. No swelling. Right lower leg: No edema. Left lower leg: No edema. Comments: No laxity. Varus/valgus testing negative. Pain in calf but no hardness, erythema. Skin:     Findings: No bruising or erythema. Neurological:      Mental Status: She is alert and oriented to person, place, and time.    Psychiatric:         Mood and Affect: Mood normal.       Dossie Free, DO  Answers for HPI/ROS submitted by the patient on 9/18/2023  Incident occurred: more than 1 week ago  Incident location: other  Injury mechanism: a twisting injury  Pain location: left knee  Pain quality: aching, stabbing  Pain - numeric: 8/10  Pain course: worsening  tingling: Yes  inability to bear weight: Yes  loss of motion: Yes  loss of sensation: No  muscle weakness: Yes  Foreign body present: no foreign bodies

## 2023-09-22 DIAGNOSIS — K22.4 ESOPHAGEAL SPASM: Primary | ICD-10-CM

## 2023-09-22 RX ORDER — NITROGLYCERIN 0.4 MG/1
0.4 TABLET SUBLINGUAL
Qty: 25 TABLET | Refills: 2 | Status: SHIPPED | OUTPATIENT
Start: 2023-09-22

## 2023-09-25 ENCOUNTER — TELEPHONE (OUTPATIENT)
Dept: FAMILY MEDICINE CLINIC | Facility: CLINIC | Age: 58
End: 2023-09-25

## 2023-09-28 ENCOUNTER — OFFICE VISIT (OUTPATIENT)
Dept: OBGYN CLINIC | Facility: CLINIC | Age: 58
End: 2023-09-28
Payer: COMMERCIAL

## 2023-09-28 VITALS
HEART RATE: 101 BPM | WEIGHT: 137.2 LBS | DIASTOLIC BLOOD PRESSURE: 86 MMHG | SYSTOLIC BLOOD PRESSURE: 127 MMHG | BODY MASS INDEX: 23.42 KG/M2 | HEIGHT: 64 IN

## 2023-09-28 DIAGNOSIS — M17.12 PRIMARY OSTEOARTHRITIS OF LEFT KNEE: Primary | ICD-10-CM

## 2023-09-28 DIAGNOSIS — Z01.89 ENCOUNTER FOR LOWER EXTREMITY COMPARISON IMAGING STUDY: ICD-10-CM

## 2023-09-28 DIAGNOSIS — M25.562 ACUTE PAIN OF LEFT KNEE: ICD-10-CM

## 2023-09-28 DIAGNOSIS — M25.562 LEFT KNEE PAIN, UNSPECIFIED CHRONICITY: ICD-10-CM

## 2023-09-28 PROCEDURE — 99204 OFFICE O/P NEW MOD 45 MIN: CPT | Performed by: ORTHOPAEDIC SURGERY

## 2023-09-28 NOTE — PROGRESS NOTES
Assessment/Plan     1. Primary osteoarthritis of left knee    2. Acute pain of left knee    3. Left knee pain, unspecified chronicity    4. Encounter for lower extremity comparison imaging study      Orders Placed This Encounter   Procedures   • XR knee 4+ vw left injury   • XR knee 1 or 2 vw right   • Ambulatory Referral to Physical Therapy     • Patient presents with mild to moderate left knee osteoarthritis and possible meniscal tear. • Discussed conservative treatment as well as risks and benefits of these treatment options. • PT script provided. • CSI declined today, can be considered at the next visit if symptoms worse or fail to improve. • Continue ibuprofen as needed. Celebrex prescription deferred due to documented sulfa allergy. • Add in Tylenol as needed for pain. 1,000 mg up to 3 times a day. Do not exceed 3,000 mg per day. • Continue activities as tolerated  • Repeat MRI to rule out meniscal pathology can be considered at the next visit if symptoms worsen or fail to improve with conservative treatment. Return in about 6 weeks (around 11/9/2023) for Recheck. I answered all of the patient's questions during the visit and provided education of the patient's condition during the visit. The patient verbalized understanding of the information given and agrees with the plan. This note was dictated using [employer] software. It may contain errors including improperly dictated words. Please contact physician directly for any questions. History of Present Illness   Chief complaint: No chief complaint on file. HPI: Douglas Flynn is a 60 y.o. female that c/o left knee pain, last seen in 2019 for the same issue. Pain has been present for 1 month, located proximal calf, lateral and anteromedial, and described as a dull/ache. Patient denies any previous injuries or surgeries.  Pain is aggravated  by walking, descending stairs, sitting, and especially pain at night, and symptoms include clicking and swelling. Patient denies any radiating pain or distal paresthesias. Pain is alleviated by ibuprofen, and possibly PT attended 5 years ago. Patient has initiated PT and uses a compression sleeve, but has not tried CSI in the past.        ROS:    See HPI for musculoskeletal review.    All other systems reviewed are negative     Historical Information   Past Medical History:   Diagnosis Date   • Allergic 1998   • Asthma    • Dysuria 2012   • Esophageal spasm 2012    Resolved:  2017   • GERD (gastroesophageal reflux disease)    • H/O oral surgery    • Migraine    • Peripheral neuropathy    • Shingles 2016   • Skin disorder 2013    Resolved:  2017     Past Surgical History:   Procedure Laterality Date   • BONE GRAFT      In mouth after tooth extraction   •  SECTION     • COLONOSCOPY     • LAMINECTOMY Bilateral     L4 and L5   • MOUTH SURGERY     • SPINE SURGERY  21    Bilateral laminectomy and 2 disc repairs   • TOOTH EXTRACTION     • WISDOM TOOTH EXTRACTION       Social History   Social History     Substance and Sexual Activity   Alcohol Use Yes   • Alcohol/week: 2.0 standard drinks of alcohol   • Types: 1 Glasses of wine, 1 Cans of beer per week    Comment: Socially     Social History     Substance and Sexual Activity   Drug Use No     Social History     Tobacco Use   Smoking Status Never   • Passive exposure: Never   Smokeless Tobacco Never     Family History:   Family History   Problem Relation Age of Onset   • Diabetes Mother    • Polymyalgia rheumatica Mother    • Arthritis Mother    • Hypertension Mother    • Alcohol abuse Father    • Asthma Father    • Heart attack Father    • Thyroid cancer Son 25   • Depression Daughter    • Anxiety disorder Daughter    • Skin cancer Sister 43   • Dementia Maternal Grandmother    • Psychiatric Illness Maternal Grandmother    • Parkinsonism Maternal Grandfather    • No Known Problems Paternal Grandmother    • No Known Problems Paternal Grandfather    • No Known Problems Maternal Aunt    • No Known Problems Paternal Aunt    • Ataxia Sister    • Asthma Son    • Cancer Son    • Completed Suicide  Cousin    • Mental illness Neg Hx    • Substance Abuse Neg Hx        Current Outpatient Medications on File Prior to Visit   Medication Sig Dispense Refill   • albuterol (PROVENTIL HFA,VENTOLIN HFA) 90 mcg/act inhaler INHALE 2 PUFFS BY MOUTH EVERY 4 HOURS AS NEEDED VIA SPACER     • ALPRAZolam (XANAX) 0.25 mg tablet TAKE 1 TABLET BY MOUTH DAILY AT BEDTIME AS NEEDED FOR ANXIETY 30 tablet 0   • budesonide-formoterol (SYMBICORT) 80-4.5 MCG/ACT inhaler Inhale 2 puffs 2 (two) times a day     • cephalexin (KEFLEX) 500 mg capsule Take 1 capsule (500 mg total) by mouth every 12 (twelve) hours for 7 days 14 capsule 0   • Cyanocobalamin (B-12) 5000 MCG SUBL Place 5,000 mcg under the tongue daily     • cyclobenzaprine (FLEXERIL) 10 mg tablet Take 1 tablet (10 mg total) by mouth 3 (three) times a day as needed for muscle spasms 30 tablet 0   • hyoscyamine (ANASPAZ,LEVSIN) 0.125 MG tablet Take 1 tablet (0.125 mg total) by mouth every 4 (four) hours as needed for cramping 30 tablet 0   • levalbuterol (XOPENEX HFA) 45 mcg/act inhaler Inhale 2 puffs every 4 (four) hours as needed  0   • lidocaine (XYLOCAINE) 5 % ointment Apply 1 application topically as needed     • Methylcobalamin (B-12) 5000 MCG TBDP Take 5,000 mcg by mouth in the morning (Patient not taking: Reported on 9/21/2023)     • nitroglycerin (NITROSTAT) 0.4 mg SL tablet Place 1 tablet (0.4 mg total) under the tongue every 5 (five) minutes as needed (esophageal spasm) 25 tablet 2   • omeprazole (PriLOSEC) 20 mg delayed release capsule Take 20 mg by mouth daily       Current Facility-Administered Medications on File Prior to Visit   Medication Dose Route Frequency Provider Last Rate Last Admin   • cyanocobalamin injection 1,000 mcg  1,000 mcg Intramuscular Weekly Alondra Wright DO   1,000 mcg at 12/27/22 1201     Allergies   Allergen Reactions   • Azithromycin Swelling     Other reaction(s): swelling   • Bactrim [Sulfamethoxazole-Trimethoprim] Swelling   • Codeine Swelling   • Sulfa Antibiotics Hives   • Sulfasalazine Hives       Current Outpatient Medications on File Prior to Visit   Medication Sig Dispense Refill   • albuterol (PROVENTIL HFA,VENTOLIN HFA) 90 mcg/act inhaler INHALE 2 PUFFS BY MOUTH EVERY 4 HOURS AS NEEDED VIA SPACER     • ALPRAZolam (XANAX) 0.25 mg tablet TAKE 1 TABLET BY MOUTH DAILY AT BEDTIME AS NEEDED FOR ANXIETY 30 tablet 0   • budesonide-formoterol (SYMBICORT) 80-4.5 MCG/ACT inhaler Inhale 2 puffs 2 (two) times a day     • cephalexin (KEFLEX) 500 mg capsule Take 1 capsule (500 mg total) by mouth every 12 (twelve) hours for 7 days 14 capsule 0   • Cyanocobalamin (B-12) 5000 MCG SUBL Place 5,000 mcg under the tongue daily     • cyclobenzaprine (FLEXERIL) 10 mg tablet Take 1 tablet (10 mg total) by mouth 3 (three) times a day as needed for muscle spasms 30 tablet 0   • hyoscyamine (ANASPAZ,LEVSIN) 0.125 MG tablet Take 1 tablet (0.125 mg total) by mouth every 4 (four) hours as needed for cramping 30 tablet 0   • levalbuterol (XOPENEX HFA) 45 mcg/act inhaler Inhale 2 puffs every 4 (four) hours as needed  0   • lidocaine (XYLOCAINE) 5 % ointment Apply 1 application topically as needed     • Methylcobalamin (B-12) 5000 MCG TBDP Take 5,000 mcg by mouth in the morning (Patient not taking: Reported on 9/21/2023)     • nitroglycerin (NITROSTAT) 0.4 mg SL tablet Place 1 tablet (0.4 mg total) under the tongue every 5 (five) minutes as needed (esophageal spasm) 25 tablet 2   • omeprazole (PriLOSEC) 20 mg delayed release capsule Take 20 mg by mouth daily       Current Facility-Administered Medications on File Prior to Visit   Medication Dose Route Frequency Provider Last Rate Last Admin   • cyanocobalamin injection 1,000 mcg  1,000 mcg Intramuscular Weekly Seema Roman DO   1,000 mcg at 12/27/22 1201       Objective   Vitals: Blood pressure 127/86, pulse 101, height 5' 4" (1.626 m), weight 62.2 kg (137 lb 3.2 oz), not currently breastfeeding. ,Body mass index is 23.55 kg/m².     PE:  AAOx 3  WDWN  Hearing intact, no drainage from eyes  Regular rate  no audible wheezing  no abdominal distension  LE compartments soft, skin intact    leftknee:    Appearance:  no swelling   No ecchymosis  no obvious joint deformity   No effusion  Palpation/Tenderness:  + TTP over medial joint line  No TTP over lateral joint line   No TTP over patella  No TTP over patellar tendon  No TTP over pes anserine bursa  Active Range of Motion:  AROM: 0-115  PROM: 0-120 with crepitus  Special Tests:  Medial Carlos's Test:  Positive  Lateral Carlos's Test:  Positive   Apley's compression test:  Negative  Lachman's Test:  negative  Anterior Drawer Test:  Negative  Patellar grind:  Negative  Valgus Stress Test:  negative  Varus Stress Test:  negative     No ipsilateral hip pain with ROM    leftLE:    Sensation grossly intact  Palpable pulse  AT/GS/EHL intact    RLE:  EHL/AT/GS/quads/hamstrings/iliopsoas 5/5, sensation grossly intact L4, L5, S1, palpable pedal pulse  LLE:  EHL/AT/GS/quads/hamstrings/iliopsoas 5/5, sensation grossly intact L4, L5, S1, palpable pedal pulse      Imaging Studies: I have personally reviewed pertinent films in PACS  XR leftknee:    Mild to moderate arthritis with joint space narrowing      Scribe Attestation    I,:  Estee Cordova am acting as a scribe while in the presence of the attending physician.:       I,:  Juanita Arcos DO personally performed the services described in this documentation    as scribed in my presence.:

## 2023-10-04 ENCOUNTER — EVALUATION (OUTPATIENT)
Dept: PHYSICAL THERAPY | Facility: CLINIC | Age: 58
End: 2023-10-04
Payer: COMMERCIAL

## 2023-10-04 DIAGNOSIS — M17.12 PRIMARY OSTEOARTHRITIS OF LEFT KNEE: ICD-10-CM

## 2023-10-04 DIAGNOSIS — M25.562 ACUTE PAIN OF LEFT KNEE: Primary | ICD-10-CM

## 2023-10-04 PROCEDURE — 97110 THERAPEUTIC EXERCISES: CPT | Performed by: PHYSICAL THERAPIST

## 2023-10-04 PROCEDURE — 97161 PT EVAL LOW COMPLEX 20 MIN: CPT | Performed by: PHYSICAL THERAPIST

## 2023-10-04 NOTE — PROGRESS NOTES
PT Evaluation     Today's date: 10/4/2023  Patient name: Rosamaria Herrmann  : 1965  MRN: [id number]  Referring provider: Vick Flores  Dx:   Encounter Diagnosis     ICD-10-CM    1. Acute pain of left knee  M25.562       2. Primary osteoarthritis of left knee  M17.12 Ambulatory Referral to Physical Therapy                     Assessment  Assessment details: Rosamaria Herrmann is a 60 y.o. female presenting to physical therapy with left knee pain and presents with pain, decreased range of motion, decreased strength, gait/balance dysfunction and decreased activity tolerance. Assessment demonstrates potential intra-articular pathology per testing combined with patellar tendinopathy per gait changes. Secondary to these impairments, patient has increased difficulty performing ADL's, household chores and  work related tasks. Samantha Dueñas would benefit from skilled PT to address these issues and maximize function. Thank you for the referral.    Impairments: abnormal gait, abnormal muscle tone, abnormal or restricted ROM, abnormal movement, activity intolerance, impaired balance, impaired physical strength, lacks appropriate home exercise program, pain with function and weight-bearing intolerance  Understanding of Dx/Px/POC: excellent  Goals  STG (3 weeks)  1. Patient will be independent with HEP  2. Decrease pain at worst by 2 points on NPRS  3. Increase left knee flexion equal to the right  4. Patient will demonstrate ability to ambulate outdoors on uneven ground without pain  LTG (6 weeks)  1. Decrease pain at worst from 4 points on NPRS  2. Increase tolerance for functional squat with proper mechanics and without pain  3. Increase L = R LE strength per MMT  4. Patient will demonstrate ability to resume normal walking routine (4 miles) without pain  5.  Increase FOTO > or equal to expected outcome    Plan  Patient would benefit from: skilled PT  Planned therapy interventions: joint mobilization, manual therapy, neuromuscular re-education, patient education, strengthening, stretching, therapeutic exercise, home exercise program, ADL training and balance  Frequency: 2x week  Duration in weeks: 8  Treatment plan discussed with: patient        Subjective Evaluation    History of Present Illness  Mechanism of injury: Patient reports to outpatient PT secondary to acute on chronic left knee pain in August.  Patient states that the pain began when insidiously in the posterolateral knee. Patient then left for [facility] and developed knee pain under the patella. Patient does note some clicking but no catching in the knee. Patient describes the pain as a strong ache with some radiation which is worse with leg positioning, descending steps, walking and improved with ice, supine lying. Patient works as a  (from home) and notes difficulty with work duties, ADL's and leisure functions as a result. Patients goals for PT are to decrease the pain and return to PLOF. Not a recurrent problem   Patient Goals  Patient goals for therapy: increased strength, independence with ADLs/IADLs, return to sport/leisure activities, increased motion, decreased pain and decreased edema    Pain  Current pain ratin  At best pain ratin  At worst pain ratin  Quality: dull ache and radiating  Relieving factors: rest and ice  Aggravating factors: walking and stair climbing  Progression: worsening    Social Support  Steps to enter house: yes  Stairs in house: yes   Lives in: multiple-level home    Hand dominance: right      Diagnostic Tests  X-ray: normal  Treatments  No previous or current treatments        Objective     Palpation   Left   No palpable tenderness to the distal biceps femoris, distal semimembranosus, distal semitendinosus, lateral gastrocnemius and medial gastrocnemius.      Neurological Testing     Reflexes   Left   Patellar (L4): brisk (3+)  Achilles (S1): brisk (3+)    Right   Patellar (L4): brisk (3+)  Achilles (S1): brisk (3+)    Active Range of Motion   Left Knee   Flexion: 127 degrees   Extension: 5 degrees     Right Knee   Flexion: WFL  Extension: 5 degrees     Passive Range of Motion   Left Knee   Flexion: 130 degrees   Extension: 4 degrees     Right Knee   Flexion: WFL  Extension: 4 degrees     Strength/Myotome Testing     Left Knee   Flexion: 4+  Extension: 4+  Quadriceps contraction: good    Right Knee   Flexion: 5  Extension: 5  Quadriceps contraction: good    Tests     Left Knee   Positive Apley's compression, patellar compression, patella-femoral grind and Thessaly's test at 20 degrees. Negative anterior drawer, anterior Lachman, lateral Carlos and medial Carlos.               Precautions: GERD< Hx of peripheral neuropathy      Manuals 10/4                                                                Neuro Re-Ed                                                                                                        Ther Ex             Recumbent bike warm up             Standing gastroc stretch             Self HS stretch 3x30"            Prone quad stretch 3x30"            Quad sets 10x5"            Heel slides 10 x5"            SLR  2x10            Leg Press             Wall sit isometrics             LAQ isometrics                          Ther Activity                                       Gait Training                                       Modalities

## 2023-10-13 ENCOUNTER — APPOINTMENT (OUTPATIENT)
Dept: PHYSICAL THERAPY | Facility: CLINIC | Age: 58
End: 2023-10-13
Payer: COMMERCIAL

## 2023-10-18 ENCOUNTER — OFFICE VISIT (OUTPATIENT)
Dept: PHYSICAL THERAPY | Facility: CLINIC | Age: 58
End: 2023-10-18

## 2023-10-18 DIAGNOSIS — M17.12 PRIMARY OSTEOARTHRITIS OF LEFT KNEE: ICD-10-CM

## 2023-10-18 DIAGNOSIS — M25.562 ACUTE PAIN OF LEFT KNEE: Primary | ICD-10-CM

## 2023-10-18 PROCEDURE — 97110 THERAPEUTIC EXERCISES: CPT | Performed by: PHYSICAL THERAPIST

## 2023-10-18 NOTE — PROGRESS NOTES
Daily Note     Today's date: 10/18/2023  Patient name: Demetri Patterson  : 1965  MRN: [id number]  Referring provider: Bonnie Tavarse  Dx:   Encounter Diagnosis     ICD-10-CM    1. Acute pain of left knee  M25.562       2. Primary osteoarthritis of left knee  M17.12                      Subjective: Patient reports HEP compliance and states that she has been experiencing good and bad days overall. Objective: See treatment diary below    Left knee PROM: 3-133 degrees with pain at end ranges    Assessment: Left knee PROM remains painful at end ranges of motion. Able to tolerate isometric progressions well without pain and update her HEP accordingly. Continuing to limit active joint stress and educated to pursue water aerobics with greater frequency. Plan: Continue per plan of care.       Precautions: GERD< Hx of peripheral neuropathy      Manuals 10/4 10/18                                                               Neuro Re-Ed                                                                                                        Ther Ex             Self HS stretch 3x30"            Prone quad stretch 3x30"            Quad sets 10x5" 10 x5"           Heel slides 10 x5"            SLR  - orange TB 2x10 2x10           Wall sit isometrics  X3 sets to failure           LAQ isometrics - chair into wall (kick into foam roll)  2x10 x5"                        Ther Activity                                       Gait Training                                       Modalities

## 2023-10-19 ENCOUNTER — OFFICE VISIT (OUTPATIENT)
Dept: OBGYN CLINIC | Facility: CLINIC | Age: 58
End: 2023-10-19

## 2023-10-19 VITALS
SYSTOLIC BLOOD PRESSURE: 113 MMHG | HEART RATE: 99 BPM | HEIGHT: 64 IN | WEIGHT: 137 LBS | DIASTOLIC BLOOD PRESSURE: 77 MMHG | BODY MASS INDEX: 23.39 KG/M2

## 2023-10-19 DIAGNOSIS — M25.562 CHRONIC PAIN OF LEFT KNEE: Primary | ICD-10-CM

## 2023-10-19 DIAGNOSIS — M17.12 PRIMARY OSTEOARTHRITIS OF LEFT KNEE: Primary | ICD-10-CM

## 2023-10-19 DIAGNOSIS — G89.29 CHRONIC PAIN OF LEFT KNEE: Primary | ICD-10-CM

## 2023-10-19 DIAGNOSIS — M17.12 PRIMARY OSTEOARTHRITIS OF LEFT KNEE: ICD-10-CM

## 2023-10-19 RX ORDER — BUPIVACAINE HYDROCHLORIDE 2.5 MG/ML
2 INJECTION, SOLUTION INFILTRATION; PERINEURAL
Status: COMPLETED | OUTPATIENT
Start: 2023-10-19 | End: 2023-10-19

## 2023-10-19 RX ORDER — TRIAMCINOLONE ACETONIDE 40 MG/ML
40 INJECTION, SUSPENSION INTRA-ARTICULAR; INTRAMUSCULAR
Status: COMPLETED | OUTPATIENT
Start: 2023-10-19 | End: 2023-10-19

## 2023-10-19 RX ADMIN — TRIAMCINOLONE ACETONIDE 40 MG: 40 INJECTION, SUSPENSION INTRA-ARTICULAR; INTRAMUSCULAR at 11:30

## 2023-10-19 RX ADMIN — BUPIVACAINE HYDROCHLORIDE 2 ML: 2.5 INJECTION, SOLUTION INFILTRATION; PERINEURAL at 11:30

## 2023-10-19 NOTE — PROGRESS NOTES
Assessment/Plan:  1. Chronic pain of left knee    2. Primary osteoarthritis of left knee      Orders Placed This Encounter   Procedures    MRI knee left  wo contrast       Patient has mild to moderate left knee osteoarthritis and possible meniscus tear. Order placed for left knee MRI to eval for meniscus tear. Patient received left knee steroid injection today. Tolerated the procedure well. Post injection instructions reviewed. Continue home exercises as learned at PT. Patient fitted for short hinge knee brace which she may wear as needed for comfort. Continue OTC NSAID and tylenol as needed for pain. No celebrex script because sulfa allergy. She may try topical voltaren gel as well which is available OTC. Return for MRI review. I answered all of the patient's questions during the visit and provided education of the patient's condition during the visit. The patient verbalized understanding of the information given and agrees with the plan. This note was dictated using [employer] software. It may contain errors including improperly dictated words. Please contact physician directly for any questions. Subjective   Chief Complaint:   Chief Complaint   Patient presents with    Left Knee - Follow-up       HPI  Emile Young is a 60 y.o. female who presents for follow up for left knee pain. Patient returns due to progressively worsening left knee pain. Pain is located over the medial aspect of of the knee and posteriorly at times. Patient states she has started to have more episodes of locking in the left knee. Patient is taking advil as needed for pain. Patient has attended outpatient PT and has continued to do home exercises. Patient notes the stretching provided some relief however her pain is overall worsening. She is not wearing a knee brace at this time. Review of Systems  ROS:    See HPI for musculoskeletal review.    All other systems reviewed are negative     History:  Past Medical History:   Diagnosis Date    Allergic 1998    Asthma     Dysuria 2012    Esophageal spasm 2012    Resolved:  2017    GERD (gastroesophageal reflux disease)     H/O oral surgery     Migraine     Peripheral neuropathy     Shingles 2016    Skin disorder 2013    Resolved:  2017     Past Surgical History:   Procedure Laterality Date    BONE GRAFT      In mouth after tooth extraction     SECTION      COLONOSCOPY      LAMINECTOMY Bilateral     L4 and L5    MOUTH SURGERY      SPINE SURGERY  21    Bilateral laminectomy and 2 disc repairs    TOOTH EXTRACTION      WISDOM TOOTH EXTRACTION       Social History   Social History     Substance and Sexual Activity   Alcohol Use Yes    Alcohol/week: 2.0 standard drinks of alcohol    Types: 1 Glasses of wine, 1 Cans of beer per week    Comment: Socially     Social History     Substance and Sexual Activity   Drug Use No     Social History     Tobacco Use   Smoking Status Never    Passive exposure: Never   Smokeless Tobacco Never     Family History:   Family History   Problem Relation Age of Onset    Diabetes Mother     Polymyalgia rheumatica Mother     Arthritis Mother     Hypertension Mother     Alcohol abuse Father     Asthma Father     Heart attack Father     Thyroid cancer Son 25    Depression Daughter     Anxiety disorder Daughter     Skin cancer Sister 43    Dementia Maternal Grandmother     Psychiatric Illness Maternal Grandmother     Parkinsonism Maternal Grandfather     No Known Problems Paternal Grandmother     No Known Problems Paternal Grandfather     No Known Problems Maternal Aunt     No Known Problems Paternal Aunt     Ataxia Sister     Asthma Son     Cancer Son     Completed Suicide  Cousin     Mental illness Neg Hx     Substance Abuse Neg Hx        Current Outpatient Medications on File Prior to Visit   Medication Sig Dispense Refill    albuterol (PROVENTIL HFA,VENTOLIN HFA) 90 mcg/act inhaler INHALE 2 PUFFS BY MOUTH EVERY 4 HOURS AS NEEDED VIA SPACER      ALPRAZolam (XANAX) 0.25 mg tablet TAKE 1 TABLET BY MOUTH DAILY AT BEDTIME AS NEEDED FOR ANXIETY 30 tablet 0    budesonide-formoterol (SYMBICORT) 80-4.5 MCG/ACT inhaler Inhale 2 puffs 2 (two) times a day      Cyanocobalamin (B-12) 5000 MCG SUBL Place 5,000 mcg under the tongue daily      cyclobenzaprine (FLEXERIL) 10 mg tablet Take 1 tablet (10 mg total) by mouth 3 (three) times a day as needed for muscle spasms 30 tablet 0    hyoscyamine (ANASPAZ,LEVSIN) 0.125 MG tablet Take 1 tablet (0.125 mg total) by mouth every 4 (four) hours as needed for cramping 30 tablet 0    levalbuterol (XOPENEX HFA) 45 mcg/act inhaler Inhale 2 puffs every 4 (four) hours as needed  0    lidocaine (XYLOCAINE) 5 % ointment Apply 1 application topically as needed      Methylcobalamin (B-12) 5000 MCG TBDP Take 5,000 mcg by mouth in the morning (Patient not taking: Reported on 9/21/2023)      nitroglycerin (NITROSTAT) 0.4 mg SL tablet Place 1 tablet (0.4 mg total) under the tongue every 5 (five) minutes as needed (esophageal spasm) 25 tablet 2    omeprazole (PriLOSEC) 20 mg delayed release capsule Take 20 mg by mouth daily       Current Facility-Administered Medications on File Prior to Visit   Medication Dose Route Frequency Provider Last Rate Last Admin    cyanocobalamin injection 1,000 mcg  1,000 mcg Intramuscular Weekly Brandy Red DO   1,000 mcg at 12/27/22 1201     Allergies   Allergen Reactions    Azithromycin Swelling     Other reaction(s): swelling    Bactrim [Sulfamethoxazole-Trimethoprim] Swelling    Codeine Swelling    Sulfa Antibiotics Hives    Sulfasalazine Hives        Objective     /77   Pulse 99   Ht 5' 4" (1.626 m)   Wt 62.1 kg (137 lb)   LMP  (LMP Unknown)   BMI 23.52 kg/m²      PE:  AAOx 3  WDWN  Hearing intact, no drainage from eyes  no audible wheezing  no abdominal distension  LE compartments soft, skin intact    Ortho Exam:  left Knee:   No erythema  no swelling  no effusion  no warmth  +TTP over medial joint line  AROM: 0- 120  Stable to varus/valgus stress      Large joint arthrocentesis: L knee  Universal Protocol:  Consent: Verbal consent obtained.   Risks and benefits: risks, benefits and alternatives were discussed  Consent given by: patient  Site marked: the operative site was marked  Supporting Documentation  Indications: pain   Procedure Details  Location: knee - L knee  Preparation: Patient was prepped and draped in the usual sterile fashion  Needle size: 22 G  Ultrasound guidance: no  Approach: anterolateral  Medications administered: 2 mL bupivacaine 0.25 %; 40 mg triamcinolone acetonide 40 mg/mL    Patient tolerance: patient tolerated the procedure well with no immediate complications  Dressing:  Sterile dressing applied                 Scribe Attestation      I,:  Azalia Key PA-C am acting as a scribe while in the presence of the attending physician.:       I,:  Vasiliy Coy DO personally performed the services described in this documentation    as scribed in my presence.:

## 2023-10-25 ENCOUNTER — APPOINTMENT (OUTPATIENT)
Dept: PHYSICAL THERAPY | Facility: CLINIC | Age: 58
End: 2023-10-25
Payer: COMMERCIAL

## 2023-10-26 ENCOUNTER — TELEPHONE (OUTPATIENT)
Age: 58
End: 2023-10-26

## 2023-10-26 NOTE — TELEPHONE ENCOUNTER
Spoke with patient. When she switched date for MRI from 11/5 to 11/1 she was told by  to contact our office. I reached out to central scheduling who confirmed with pre encounters that patient is approved for MRI at [facility] on 11/1. No further action needed at this time. I left voicemail for patient.

## 2023-10-26 NOTE — TELEPHONE ENCOUNTER
Caller: patient    Doctor: Reed Dodd    Reason for call: Patient needed sooner MRI appointment and scheduled at different location.  Patient needs Dr to approve new location of MRI appointment on 11/1/2023 at 8am.    Call back#: [phone]

## 2023-11-01 ENCOUNTER — HOSPITAL ENCOUNTER (OUTPATIENT)
Dept: MRI IMAGING | Facility: HOSPITAL | Age: 58
Discharge: HOME | End: 2023-11-01
Payer: COMMERCIAL

## 2023-11-01 DIAGNOSIS — G89.29 CHRONIC PAIN OF LEFT KNEE: ICD-10-CM

## 2023-11-01 DIAGNOSIS — M17.12 PRIMARY OSTEOARTHRITIS OF LEFT KNEE: ICD-10-CM

## 2023-11-01 DIAGNOSIS — M25.562 CHRONIC PAIN OF LEFT KNEE: ICD-10-CM

## 2023-11-01 PROCEDURE — 73721 MRI JNT OF LWR EXTRE W/O DYE: CPT

## 2023-11-01 PROCEDURE — G1004 CDSM NDSC: HCPCS

## 2023-11-06 ENCOUNTER — TELEPHONE (OUTPATIENT)
Dept: OBGYN CLINIC | Facility: HOSPITAL | Age: 58
End: 2023-11-06

## 2023-11-06 ENCOUNTER — TELEPHONE (OUTPATIENT)
Dept: FAMILY MEDICINE CLINIC | Facility: CLINIC | Age: 58
End: 2023-11-06

## 2023-11-06 NOTE — TELEPHONE ENCOUNTER
PATIENT RECENTLY HAD A CSI TO THE KNEE BY DR ALONSO. PT C/O KNEE PAIN SINCE THE CSI.   PT RECENTLY HAD AN MRI OF HER KNEES.   PT REQUEST THE TEAM REVIEW HER RESULTS AND GIVE HER SOME ADVISE ON HOW TO PROCEED POST CSI FAILIING.

## 2023-11-06 NOTE — TELEPHONE ENCOUNTER
Patient left voicemail requesting a referral to see another specialist to review knee MRI. Dr. Jayna Pickens with [facility]     Patient voicemail :       Hi, my name is Ronnie MARK birth date 1965. I'm calling to get a referral to a [facility] physician to get a second opinion on my knee MRI. I already have an appointment. His name is Doctor Jennifer Monroe. He's with [facility]. I'm sorry, [address]. I do have a fax number for him, if that helps. The fax number at his office [phone] and this would be a second opinion on my knee that I had the MRI done on November 1st. If you could give me a call and let me know if you're able to do this referral. My appointment is in one week. It's on November 13th, Monday, November 13th and again it's Ronnie Prater. My phone number is [phone]. Thank you.  fabian Goodwin.

## 2023-11-07 NOTE — TELEPHONE ENCOUNTER
Patient called back following up on request for referral . She is seeing an orth with [facility] but would like the second opinion.      Thank you

## 2023-11-08 NOTE — TELEPHONE ENCOUNTER
Called and spoke w/pt and has left knee pain and had CSI and lasted only a day and a half.  She had MRI 11/1/23 (results in chart).  F/U appt 11/17/23.  Pain level 9-10/10 and she cannot put weight on it. Pt is currently taking Ibuprofen 600mg and Tylenol ES 1000mg every 6 hours.  Works from home and.rest elevates, ices but doesn't seem to help much and feels swollen on inside; has a brace w/hinges and wears. Offloads with crutches. Not wearing brace or ace wrap because it hurts to touch it.  Denies redness, swelling.  Scheduled pt for 2PM in [place] office as pt states waiting for 11/17/23 appt was made when MRI was scheduled at a later date.     Please advise further.

## 2023-11-08 NOTE — TELEPHONE ENCOUNTER
Called and spoke with pt and relayed DASHA Encinas msg, pt states she does not want the Diclofenac as NSAIDS do cause stomach upset. Pt will see Dr Shepherd on Friday and I stated Gel injections will be discussed then if it is something she would like to do. Pt understands recommendations and will call back with further questions or concerns.

## 2023-11-08 NOTE — TELEPHONE ENCOUNTER
Kris Tan, patient's MRI shows cartilage wear in the medial and patellofemoral compartments. Agree that she should continue NSAID, I could offer her a prescription strength NSAID diclofenac tablets if she would like to try that. Tylenol should only be taken up to 3000 mg per day. Continue ice and rest as needed. We could consider ordering gel injections which we could discuss at her next appt. Please let me know if she would like that script sent today or if she prefers to wait until we review the imaging at her appt. Thanks!

## 2023-11-08 NOTE — TELEPHONE ENCOUNTER
Caller: )Patient    Doctor: Cora    Reason for call: Patient had mri 11/1. Follow up appt 11/17. Patient unable to bear weight. Has been using crutches. Pain 9.5/10. Waking patient up @night. Questioned if there is anything she could do for the pain?     Call back#: [phone]

## 2023-11-10 ENCOUNTER — OFFICE VISIT (OUTPATIENT)
Dept: OBGYN CLINIC | Facility: MEDICAL CENTER | Age: 58
End: 2023-11-10
Payer: COMMERCIAL

## 2023-11-10 VITALS
BODY MASS INDEX: 23.39 KG/M2 | HEART RATE: 120 BPM | HEIGHT: 64 IN | DIASTOLIC BLOOD PRESSURE: 75 MMHG | SYSTOLIC BLOOD PRESSURE: 131 MMHG | WEIGHT: 137 LBS

## 2023-11-10 DIAGNOSIS — M25.562 CHRONIC PAIN OF LEFT KNEE: ICD-10-CM

## 2023-11-10 DIAGNOSIS — G89.29 CHRONIC PAIN OF LEFT KNEE: ICD-10-CM

## 2023-11-10 DIAGNOSIS — M17.12 PRIMARY OSTEOARTHRITIS OF LEFT KNEE: Primary | ICD-10-CM

## 2023-11-10 PROCEDURE — 99213 OFFICE O/P EST LOW 20 MIN: CPT | Performed by: ORTHOPAEDIC SURGERY

## 2023-11-10 NOTE — PROGRESS NOTES
Assessment/Plan:  1. Primary osteoarthritis of left knee    2. Chronic pain of left knee      Orders Placed This Encounter   Procedures    Injection Procedure Prior Authorization       Patient has moderate left knee osteoarthritis. We discussed treatment options in detail. Order placed for left knee series of 3 VS injections which can be admin after 12/19/23. Continue OTC NSAID and tylenol as needed for pain. She may also try OTC voltaren gel if she is trying to limit oral NSAID due to IBS. Patient was advised to limit her weight bearing for a few weeks to allow area of edema to heal. She may use crutches for assistance as needed. She may advance her activity as tolerated. Patient was encouraged to reach out to Dr. Corinne Torres with [facility] regarding left sided weakness. She will monitor left side over the weekend and call Dr. Corinne Torres if it persistent by next week. If any worsening over the weekend patient can go to the ER. Return in about 6 weeks (around 12/22/2023) for left knee VS series. I answered all of the patient's questions during the visit and provided education of the patient's condition during the visit. The patient verbalized understanding of the information given and agrees with the plan. This note was dictated using Animated Speech software. It may contain errors including improperly dictated words. Please contact physician directly for any questions. Subjective   Chief Complaint:   Chief Complaint   Patient presents with    Left Knee - Follow-up       HPI  Carrie Hurtado is a 60 y.o. female who presents for follow up for left knee. Patient received left knee steroid injection at her last visit on 10/19/2023 and reports that she had good pain relief initially for about 3 days and then had sudden onset return of her baseline pain in the left knee. Patient states since the injection she feels that her left knee has gotten progressively more weak.   She notes she has significant pain rated 8 out of 10 with any weightbearing activity. Pain occurs in the anteromedial knee and radiates into the shin at times. She finds that the only comfortable position is with her leg extended straight in front of her. Patient states overall her left leg feels weak and the weakness and pain limit her activity level significantly. She will take 400 mg of ibuprofen and 1000 mg of Tylenol each day for pain control. She tries to limit the amount of oral NSAID she takes due to having IBS. Patient was wearing the knee brace which provided some stability however increased her pain. She is currently ambulating with crutches in order to limit the weightbearing in her leg. Patient denies any new injuries. Patient denies low back pain or numbness and tingling in the left leg. She does have a history of cauda equina syndrome that resulted in emergent surgery performed by Dr. Yumiko Abbott at [place]. She states her last follow-up with Dr. Yumiko Abbott was in April and she was not having any issues at that time. Review of Systems  ROS:    See HPI for musculoskeletal review.    All other systems reviewed are negative     History:  Past Medical History:   Diagnosis Date    Allergic 1998    Asthma     Dysuria 2012    Esophageal spasm 2012    Resolved:  2017    GERD (gastroesophageal reflux disease)     H/O oral surgery     Migraine     Peripheral neuropathy     Shingles 2016    Skin disorder 2013    Resolved:  2017     Past Surgical History:   Procedure Laterality Date    BONE GRAFT      In mouth after tooth extraction     SECTION      COLONOSCOPY      LAMINECTOMY Bilateral     L4 and L5    MOUTH SURGERY      SPINE SURGERY  21    Bilateral laminectomy and 2 disc repairs    TOOTH EXTRACTION      WISDOM TOOTH EXTRACTION       Social History   Social History     Substance and Sexual Activity   Alcohol Use Yes    Alcohol/week: 2.0 standard drinks of alcohol    Types: 1 Glasses of wine, 1 Cans of beer per week    Comment: Socially     Social History     Substance and Sexual Activity   Drug Use No     Social History     Tobacco Use   Smoking Status Never    Passive exposure: Never   Smokeless Tobacco Never     Family History:   Family History   Problem Relation Age of Onset    Diabetes Mother     Polymyalgia rheumatica Mother     Arthritis Mother     Hypertension Mother     Alcohol abuse Father     Asthma Father     Heart attack Father     Thyroid cancer Son 25    Depression Daughter     Anxiety disorder Daughter     Skin cancer Sister 43    Dementia Maternal Grandmother     Psychiatric Illness Maternal Grandmother     Parkinsonism Maternal Grandfather     No Known Problems Paternal Grandmother     No Known Problems Paternal Grandfather     No Known Problems Maternal Aunt     No Known Problems Paternal Aunt     Ataxia Sister     Asthma Son     Cancer Son     Completed Suicide  Cousin     Mental illness Neg Hx     Substance Abuse Neg Hx        Current Outpatient Medications on File Prior to Visit   Medication Sig Dispense Refill    albuterol (PROVENTIL HFA,VENTOLIN HFA) 90 mcg/act inhaler INHALE 2 PUFFS BY MOUTH EVERY 4 HOURS AS NEEDED VIA SPACER      ALPRAZolam (XANAX) 0.25 mg tablet TAKE 1 TABLET BY MOUTH DAILY AT BEDTIME AS NEEDED FOR ANXIETY 30 tablet 0    budesonide-formoterol (SYMBICORT) 80-4.5 MCG/ACT inhaler Inhale 2 puffs 2 (two) times a day      Cyanocobalamin (B-12) 5000 MCG SUBL Place 5,000 mcg under the tongue daily      cyclobenzaprine (FLEXERIL) 10 mg tablet Take 1 tablet (10 mg total) by mouth 3 (three) times a day as needed for muscle spasms 30 tablet 0    hyoscyamine (ANASPAZ,LEVSIN) 0.125 MG tablet Take 1 tablet (0.125 mg total) by mouth every 4 (four) hours as needed for cramping 30 tablet 0    levalbuterol (XOPENEX HFA) 45 mcg/act inhaler Inhale 2 puffs every 4 (four) hours as needed  0    lidocaine (XYLOCAINE) 5 % ointment Apply 1 application topically as needed      Methylcobalamin (B-12) 5000 MCG TBDP Take 5,000 mcg by mouth in the morning (Patient not taking: Reported on 9/21/2023)      nitroglycerin (NITROSTAT) 0.4 mg SL tablet Place 1 tablet (0.4 mg total) under the tongue every 5 (five) minutes as needed (esophageal spasm) 25 tablet 2    omeprazole (PriLOSEC) 20 mg delayed release capsule Take 20 mg by mouth daily       Current Facility-Administered Medications on File Prior to Visit   Medication Dose Route Frequency Provider Last Rate Last Admin    cyanocobalamin injection 1,000 mcg  1,000 mcg Intramuscular Weekly Gold Hoover DO   1,000 mcg at 12/27/22 1201     Allergies   Allergen Reactions    Azithromycin Swelling     Other reaction(s): swelling    Bactrim [Sulfamethoxazole-Trimethoprim] Swelling    Codeine Swelling    Sulfa Antibiotics Hives    Sulfasalazine Hives        Objective     /75   Pulse (!) 120   Ht 5' 4" (1.626 m)   Wt 62.1 kg (137 lb)   LMP  (LMP Unknown)   BMI 23.52 kg/m²      PE:  AAOx 3  WDWN  Hearing intact, no drainage from eyes  no audible wheezing  no abdominal distension  LE compartments soft, skin intact    Ortho Exam:  left Knee:   No erythema  no swelling  no effusion  no warmth  No TTP  AROM: 0- 115, pain with full flexion  Stable to varus/valgus stress    Sensation intact L4-S1  Palpable pedal pulses bilaterally  Strength 5/5 bilat EHL, AT, GS, quad, hamstring, right hip flexion, blat hip adductors, hip abductors  Strength 4/5 left hip flexion    Imaging Studies: I have personally reviewed pertinent films in PACS  MRI left knee: moderate osteoarthritis medial compartment, severe osteoarthritis patellofemoral compartment, edema in the medial tibial plateau      Scribe Attestation      I,:  Shonda Chavis PA-C am acting as a scribe while in the presence of the attending physician.:       I,:  Shelly Del Toro DO personally performed the services described in this documentation    as scribed in my presence.:

## 2023-11-28 DIAGNOSIS — F41.9 ANXIETY: ICD-10-CM

## 2023-11-29 RX ORDER — ALPRAZOLAM 0.25 MG/1
0.25 TABLET ORAL
Qty: 30 TABLET | Refills: 0 | Status: SHIPPED | OUTPATIENT
Start: 2023-11-29

## 2023-12-05 ENCOUNTER — TELEPHONE (OUTPATIENT)
Dept: NEUROLOGY | Facility: CLINIC | Age: 58
End: 2023-12-05

## 2023-12-05 NOTE — TELEPHONE ENCOUNTER
LMOM to confirm pt's appt w/ Brandy Alonso in [facility] on 12/13/23 at 8:30. Advised pt to arrive 10 minutes prior to check in with the .

## 2023-12-15 ENCOUNTER — PROCEDURE VISIT (OUTPATIENT)
Dept: OBGYN CLINIC | Facility: MEDICAL CENTER | Age: 58
End: 2023-12-15
Payer: COMMERCIAL

## 2023-12-15 VITALS
HEART RATE: 101 BPM | BODY MASS INDEX: 23.39 KG/M2 | SYSTOLIC BLOOD PRESSURE: 138 MMHG | HEIGHT: 64 IN | WEIGHT: 137 LBS | DIASTOLIC BLOOD PRESSURE: 77 MMHG

## 2023-12-15 DIAGNOSIS — M17.12 PRIMARY OSTEOARTHRITIS OF LEFT KNEE: Primary | ICD-10-CM

## 2023-12-15 PROCEDURE — 20610 DRAIN/INJ JOINT/BURSA W/O US: CPT | Performed by: ORTHOPAEDIC SURGERY

## 2023-12-15 NOTE — PROGRESS NOTES
1. Primary osteoarthritis of left knee          Patient is experiencing moderate left knee osteoarthritis  Patient is here for her injection of Orthovisc 1 of 3 left knee       Physical exam of the knee shows no effusion no ecchymosis. Large joint arthrocentesis: L knee  Universal Protocol:  Consent: Verbal consent obtained.   Risks and benefits: risks, benefits and alternatives were discussed  Consent given by: patient  Patient understanding: patient states understanding of the procedure being performed  Patient identity confirmed: verbally with patient  Supporting Documentation  Indications: pain and diagnostic evaluation   Procedure Details  Location: knee - L knee  Preparation: Patient was prepped and draped in the usual sterile fashion  Needle size: 22 G  Ultrasound guidance: no  Approach: anterolateral  Medications administered: 48 mg hylan 48 MG/6ML

## 2023-12-22 ENCOUNTER — PROCEDURE VISIT (OUTPATIENT)
Dept: OBGYN CLINIC | Facility: MEDICAL CENTER | Age: 58
End: 2023-12-22
Payer: COMMERCIAL

## 2023-12-22 VITALS
HEIGHT: 64 IN | SYSTOLIC BLOOD PRESSURE: 134 MMHG | DIASTOLIC BLOOD PRESSURE: 82 MMHG | WEIGHT: 136.8 LBS | BODY MASS INDEX: 23.35 KG/M2 | HEART RATE: 109 BPM

## 2023-12-22 DIAGNOSIS — M17.12 PRIMARY OSTEOARTHRITIS OF LEFT KNEE: Primary | ICD-10-CM

## 2023-12-22 PROCEDURE — 20610 DRAIN/INJ JOINT/BURSA W/O US: CPT | Performed by: ORTHOPAEDIC SURGERY

## 2023-12-22 NOTE — PROGRESS NOTES
1. Primary osteoarthritis of left knee  Large joint arthrocentesis: L knee        Patient is experiencing moderate left knee osteoarthritis.  Patient is here for her injection of Orthovisc 2 of 3 left knee     Physical exam of the knee shows no effusion no ecchymosis.    Patient tolerated this well.  Patient will follow-up in 1 week for 3 of 3 Orthovisc left knee injection    Large joint arthrocentesis: L knee  Universal Protocol:  Consent: Verbal consent obtained.  Risks and benefits: risks, benefits and alternatives were discussed  Consent given by: patient  Patient understanding: patient states understanding of the procedure being performed  Patient identity confirmed: verbally with patient  Supporting Documentation  Indications: pain and diagnostic evaluation   Procedure Details  Location: knee - L knee  Preparation: Patient was prepped and draped in the usual sterile fashion  Needle size: 22 G  Ultrasound guidance: no  Approach: anterolateral  Medications administered: 30 mg sodium hyaluronate 30 mg/2 mL

## 2023-12-29 ENCOUNTER — PROCEDURE VISIT (OUTPATIENT)
Dept: OBGYN CLINIC | Facility: MEDICAL CENTER | Age: 58
End: 2023-12-29
Payer: COMMERCIAL

## 2023-12-29 VITALS
WEIGHT: 138 LBS | BODY MASS INDEX: 23.56 KG/M2 | HEIGHT: 64 IN | SYSTOLIC BLOOD PRESSURE: 130 MMHG | HEART RATE: 99 BPM | DIASTOLIC BLOOD PRESSURE: 85 MMHG

## 2023-12-29 DIAGNOSIS — M17.12 PRIMARY OSTEOARTHRITIS OF LEFT KNEE: Primary | ICD-10-CM

## 2023-12-29 PROCEDURE — 20610 DRAIN/INJ JOINT/BURSA W/O US: CPT | Performed by: ORTHOPAEDIC SURGERY

## 2023-12-29 NOTE — PROGRESS NOTES
"Ms. Santos is here today for her 3rd L knee orthovisc injection.    Large joint arthrocentesis: L knee  Universal Protocol:  Consent given by: patient  Time out: Immediately prior to procedure a \"time out\" was called to verify the correct patient, procedure, equipment, support staff and site/side marked as required.  Site marked: the operative site was marked  Supporting Documentation  Indications: pain   Procedure Details  Location: knee - L knee  Preparation: Patient was prepped and draped in the usual sterile fashion  Needle size: 22 G  Ultrasound guidance: no  Approach: anterolateral  Medications administered: 30 mg sodium hyaluronate 30 mg/2 mL    Patient tolerance: patient tolerated the procedure well with no immediate complications  Dressing:  Sterile dressing applied        Tolerated well.  Follow up in 2 months if needed.  "

## 2024-01-10 ENCOUNTER — OFFICE VISIT (OUTPATIENT)
Dept: FAMILY MEDICINE CLINIC | Facility: CLINIC | Age: 59
End: 2024-01-10
Payer: COMMERCIAL

## 2024-01-10 VITALS
HEART RATE: 102 BPM | OXYGEN SATURATION: 98 % | DIASTOLIC BLOOD PRESSURE: 60 MMHG | WEIGHT: 135 LBS | TEMPERATURE: 97.7 F | SYSTOLIC BLOOD PRESSURE: 114 MMHG | BODY MASS INDEX: 23.05 KG/M2 | RESPIRATION RATE: 16 BRPM | HEIGHT: 64 IN

## 2024-01-10 DIAGNOSIS — Z23 ENCOUNTER FOR IMMUNIZATION: ICD-10-CM

## 2024-01-10 DIAGNOSIS — K21.9 GASTROESOPHAGEAL REFLUX DISEASE WITHOUT ESOPHAGITIS: ICD-10-CM

## 2024-01-10 DIAGNOSIS — Z13.0 SCREENING FOR DEFICIENCY ANEMIA: ICD-10-CM

## 2024-01-10 DIAGNOSIS — Z82.49 FAMILY HISTORY OF EARLY CAD: ICD-10-CM

## 2024-01-10 DIAGNOSIS — Z12.31 ENCOUNTER FOR SCREENING MAMMOGRAM FOR MALIGNANT NEOPLASM OF BREAST: ICD-10-CM

## 2024-01-10 DIAGNOSIS — Z00.00 ANNUAL PHYSICAL EXAM: Primary | ICD-10-CM

## 2024-01-10 DIAGNOSIS — E78.2 MIXED HYPERLIPIDEMIA: ICD-10-CM

## 2024-01-10 PROCEDURE — 99396 PREV VISIT EST AGE 40-64: CPT | Performed by: FAMILY MEDICINE

## 2024-01-10 PROCEDURE — 90750 HZV VACC RECOMBINANT IM: CPT

## 2024-01-10 PROCEDURE — 90471 IMMUNIZATION ADMIN: CPT

## 2024-01-10 RX ORDER — OMEPRAZOLE 20 MG/1
20 CAPSULE, DELAYED RELEASE ORAL DAILY
Qty: 30 CAPSULE | Refills: 5 | Status: SHIPPED | OUTPATIENT
Start: 2024-01-10

## 2024-01-10 NOTE — PROGRESS NOTES
ADULT ANNUAL PHYSICAL  [facility]    NAME: Yuridia Santos  AGE: 58 y.o. SEX: female  : 1965     DATE: 1/10/2024     Assessment and Plan:     Patient was seen for routine annual physical exam.  Overall doing well with the exception of chronic left knee pain.  She is seeing orthopedics and has had a series of Visco injections as well as corticosteroid injections with limited benefit.  Her chronic medical problems are stable.  She is up-to-date with colon screening.  She is due for mammogram in the near future.  She is due for routine screening blood work which we will check.  She has a history of elevated cholesterol and a family history but has been reluctant to take statin medications.  Will also check a calcium CT score to further identify her level of risk.      Problem List Items Addressed This Visit     Mixed hyperlipidemia    Relevant Orders    Comprehensive metabolic panel    Lipid Panel with Direct LDL reflex    TSH, 3rd generation with Free T4 reflex    CT coronary calcium score   Other Visit Diagnoses     Annual physical exam    -  Primary    Encounter for screening mammogram for malignant neoplasm of breast        Relevant Orders    Mammo screening bilateral w 3d & cad    Encounter for immunization        Relevant Orders    Zoster Vaccine Recombinant IM (Completed)    Gastroesophageal reflux disease without esophagitis        Relevant Medications    omeprazole (PriLOSEC) 20 mg delayed release capsule    Family history of early CAD        Relevant Orders    CT coronary calcium score    Screening for deficiency anemia        Relevant Orders    CBC and differential          Immunizations and preventive care screenings were discussed with patient today. Appropriate education was printed on patient's after visit summary.    Counseling:  Alcohol/drug use: discussed moderation in alcohol intake, the recommendations for healthy alcohol use, and  avoidance of illicit drug use.  Dental Health: discussed importance of regular tooth brushing, flossing, and dental visits.  Injury prevention: discussed safety/seat belts, safety helmets, smoke detectors, carbon dioxide detectors, and smoking near bedding or upholstery.  Exercise: the importance of regular exercise/physical activity was discussed. Recommend exercise 3-5 times per week for at least 30 minutes.          No follow-ups on file.     Chief Complaint:     Chief Complaint   Patient presents with   • Physical Exam      History of Present Illness:     Adult Annual Physical   Patient here for a comprehensive physical exam. The patient reports no problems.    Diet and Physical Activity  Diet/Nutrition: well balanced diet and consuming 3-5 servings of fruits/vegetables daily.   Exercise: walking.      Depression Screening  PHQ-2/9 Depression Screening    Little interest or pleasure in doing things: 1 - several days  Feeling down, depressed, or hopeless: 1 - several days       General Health  Sleep: gets 7-8 hours of sleep on average.   Hearing: normal - bilateral.  Vision: goes for regular eye exams, most recent eye exam <1 year ago, and wears contacts.   Dental: regular dental visits.       /GYN Health  Follows with gynecology? yes   Patient is: postmenopausal  Last menstrual period: n/a  Contraceptive method:  none .    A   Review of Systems:     Review of Systems   Constitutional: Negative.    HENT: Negative.  Negative for congestion, ear pain, hearing loss, nosebleeds, sore throat and trouble swallowing.    Eyes: Negative.    Respiratory:  Negative for apnea, cough, chest tightness, shortness of breath and wheezing.    Cardiovascular: Negative.    Gastrointestinal:  Negative for abdominal pain, blood in stool, constipation, diarrhea, nausea and vomiting.   Endocrine: Negative.    Genitourinary:  Negative for difficulty urinating, dysuria, frequency, hematuria and urgency.   Musculoskeletal:  Positive for  arthralgias (Left knee). Negative for joint swelling and myalgias.   Skin:  Negative for rash.   Neurological:  Negative for dizziness, syncope, light-headedness, numbness and headaches.   Hematological: Negative.    Psychiatric/Behavioral:  Negative for confusion, dysphoric mood and sleep disturbance. The patient is not nervous/anxious.       Past Medical History:     Past Medical History:   Diagnosis Date   • Allergic 1998   • Asthma    • Dysuria 2012   • Esophageal spasm 2012    Resolved:  2017   • GERD (gastroesophageal reflux disease)    • H/O oral surgery    • Migraine    • Peripheral neuropathy    • Shingles 2016   • Skin disorder 2013    Resolved:  2017      Past Surgical History:     Past Surgical History:   Procedure Laterality Date   • BONE GRAFT      In mouth after tooth extraction   •  SECTION     • COLONOSCOPY     • LAMINECTOMY Bilateral     L4 and L5   • MOUTH SURGERY     • SPINE SURGERY  21    Bilateral laminectomy and 2 disc repairs   • TOOTH EXTRACTION     • WISDOM TOOTH EXTRACTION        Social History:     Social History     Socioeconomic History   • Marital status: /Civil Union     Spouse name: None   • Number of children: None   • Years of education: None   • Highest education level: None   Occupational History   • None   Tobacco Use   • Smoking status: Never     Passive exposure: Never   • Smokeless tobacco: Never   Vaping Use   • Vaping status: Never Used   Substance and Sexual Activity   • Alcohol use: Yes     Alcohol/week: 2.0 standard drinks of alcohol     Types: 1 Glasses of wine, 1 Cans of beer per week     Comment: Socially   • Drug use: No   • Sexual activity: Yes     Partners: Male     Birth control/protection: Post-menopausal   Other Topics Concern   • None   Social History Narrative   • None     Social Determinants of Health     Financial Resource Strain: Not on file   Food Insecurity: Not on file   Transportation Needs:  Not on file   Physical Activity: Not on file   Stress: Not on file   Social Connections: Not on file   Intimate Partner Violence: Not on file   Housing Stability: Not on file      Family History:     Family History   Problem Relation Age of Onset   • Diabetes Mother    • Polymyalgia rheumatica Mother    • Arthritis Mother    • Hypertension Mother    • Alcohol abuse Father    • Asthma Father    • Heart attack Father    • Thyroid cancer Son 22   • Depression Daughter    • Anxiety disorder Daughter    • Skin cancer Sister 42   • Dementia Maternal Grandmother    • Psychiatric Illness Maternal Grandmother    • Parkinsonism Maternal Grandfather    • No Known Problems Paternal Grandmother    • No Known Problems Paternal Grandfather    • No Known Problems Maternal Aunt    • No Known Problems Paternal Aunt    • Ataxia Sister    • Asthma Son    • Cancer Son    • Completed Suicide  Cousin    • Mental illness Neg Hx    • Substance Abuse Neg Hx       Current Medications:     Current Outpatient Medications   Medication Sig Dispense Refill   • albuterol (PROVENTIL HFA,VENTOLIN HFA) 90 mcg/act inhaler INHALE 2 PUFFS BY MOUTH EVERY 4 HOURS AS NEEDED VIA SPACER     • ALPRAZolam (XANAX) 0.25 mg tablet Take 1 tablet (0.25 mg total) by mouth daily at bedtime as needed for anxiety 30 tablet 0   • budesonide-formoterol (SYMBICORT) 80-4.5 MCG/ACT inhaler Inhale 2 puffs 2 (two) times a day     • Cyanocobalamin (B-12) 5000 MCG SUBL Place 5,000 mcg under the tongue daily     • hyoscyamine (ANASPAZ,LEVSIN) 0.125 MG tablet Take 1 tablet (0.125 mg total) by mouth every 4 (four) hours as needed for cramping 30 tablet 0   • omeprazole (PriLOSEC) 20 mg delayed release capsule Take 1 capsule (20 mg total) by mouth daily 30 capsule 5   • cyclobenzaprine (FLEXERIL) 10 mg tablet Take 1 tablet (10 mg total) by mouth 3 (three) times a day as needed for muscle spasms (Patient not taking: Reported on 12/29/2023) 30 tablet 0   • levalbuterol (XOPENEX HFA)  "45 mcg/act inhaler Inhale 2 puffs every 4 (four) hours as needed (Patient not taking: Reported on 12/29/2023)  0   • lidocaine (XYLOCAINE) 5 % ointment Apply 1 application topically as needed (Patient not taking: Reported on 12/29/2023)     • Methylcobalamin (B-12) 5000 MCG TBDP Take 5,000 mcg by mouth in the morning (Patient not taking: Reported on 9/21/2023)     • nitroglycerin (NITROSTAT) 0.4 mg SL tablet Place 1 tablet (0.4 mg total) under the tongue every 5 (five) minutes as needed (esophageal spasm) 25 tablet 2     Current Facility-Administered Medications   Medication Dose Route Frequency Provider Last Rate Last Admin   • cyanocobalamin injection 1,000 mcg  1,000 mcg Intramuscular Weekly Alondra Wright DO   1,000 mcg at 12/27/22 1201      Allergies:     Allergies   Allergen Reactions   • Azithromycin Swelling     Other reaction(s): swelling   • Bactrim [Sulfamethoxazole-Trimethoprim] Swelling   • Codeine Swelling   • Sulfa Antibiotics Hives   • Sulfasalazine Hives      Physical Exam:     /60 (BP Location: Left arm, Patient Position: Sitting, Cuff Size: Standard)   Pulse 102   Temp 97.7 °F (36.5 °C) (Temporal)   Resp 16   Ht 5' 3.75\" (1.619 m)   Wt 61.2 kg (135 lb)   LMP  (LMP Unknown)   SpO2 98%   BMI 23.35 kg/m²     Physical Exam  Vitals and nursing note reviewed.   Constitutional:       Appearance: She is well-developed.   HENT:      Head: Normocephalic and atraumatic.      Nose: Nose normal.      Mouth/Throat:      Mouth: Mucous membranes are moist.   Eyes:      Pupils: Pupils are equal, round, and reactive to light.   Cardiovascular:      Rate and Rhythm: Normal rate and regular rhythm.      Heart sounds: Normal heart sounds.   Pulmonary:      Effort: Pulmonary effort is normal.      Breath sounds: Normal breath sounds.   Abdominal:      General: Bowel sounds are normal.      Palpations: Abdomen is soft.   Musculoskeletal:         General: Normal range of motion.      Cervical back: Normal " range of motion and neck supple.   Skin:     General: Skin is warm and dry.      Capillary Refill: Capillary refill takes less than 2 seconds.   Neurological:      Mental Status: She is alert and oriented to person, place, and time.   Psychiatric:         Behavior: Behavior normal.         Thought Content: Thought content normal.         Judgment: Judgment normal.          Giuliano Carias DO  [facility]

## 2024-01-13 ENCOUNTER — HOSPITAL ENCOUNTER (OUTPATIENT)
Dept: CT IMAGING | Facility: HOSPITAL | Age: 59
Discharge: HOME | End: 2024-01-13

## 2024-01-13 DIAGNOSIS — E78.2 MIXED HYPERLIPIDEMIA: ICD-10-CM

## 2024-01-13 DIAGNOSIS — Z82.49 FAMILY HISTORY OF EARLY CAD: ICD-10-CM

## 2024-01-16 ENCOUNTER — TELEPHONE (OUTPATIENT)
Dept: NEUROLOGY | Facility: CLINIC | Age: 59
End: 2024-01-16

## 2024-01-16 DIAGNOSIS — Z82.49 FAMILY HISTORY OF EARLY CAD: ICD-10-CM

## 2024-01-16 DIAGNOSIS — R00.0 TACHYCARDIA: Primary | ICD-10-CM

## 2024-01-16 NOTE — TELEPHONE ENCOUNTER
LMOM to confirm pt's appt w/ Shira Dover in [facility] on 1/24/24 at 9:15. Advised pt to arrive 10 minutes prior to check in with the .

## 2024-01-17 LAB
ALBUMIN SERPL-MCNC: 4.7 G/DL (ref 3.6–5.1)
ALBUMIN/GLOB SERPL: 2.2 (CALC) (ref 1–2.5)
ALP SERPL-CCNC: 59 U/L (ref 37–153)
ALT SERPL-CCNC: 18 U/L (ref 6–29)
AST SERPL-CCNC: 17 U/L (ref 10–35)
BASOPHILS # BLD AUTO: 59 CELLS/UL (ref 0–200)
BASOPHILS NFR BLD AUTO: 1.2 %
BILIRUB SERPL-MCNC: 0.6 MG/DL (ref 0.2–1.2)
BUN SERPL-MCNC: 15 MG/DL (ref 7–25)
BUN/CREAT SERPL: NORMAL (CALC) (ref 6–22)
CALCIUM SERPL-MCNC: 9.6 MG/DL (ref 8.6–10.4)
CHLORIDE SERPL-SCNC: 104 MMOL/L (ref 98–110)
CHOLEST SERPL-MCNC: 262 MG/DL
CHOLEST/HDLC SERPL: 3.3 (CALC)
CO2 SERPL-SCNC: 26 MMOL/L (ref 20–32)
CREAT SERPL-MCNC: 0.86 MG/DL (ref 0.5–1.03)
EOSINOPHIL # BLD AUTO: 505 CELLS/UL (ref 15–500)
EOSINOPHIL NFR BLD AUTO: 10.3 %
ERYTHROCYTE [DISTWIDTH] IN BLOOD BY AUTOMATED COUNT: 12 % (ref 11–15)
GFR/BSA.PRED SERPLBLD CYS-BASED-ARV: 78 ML/MIN/1.73M2
GLOBULIN SER CALC-MCNC: 2.1 G/DL (CALC) (ref 1.9–3.7)
GLUCOSE SERPL-MCNC: 97 MG/DL (ref 65–99)
HCT VFR BLD AUTO: 39.1 % (ref 35–45)
HDLC SERPL-MCNC: 79 MG/DL
HGB BLD-MCNC: 13.6 G/DL (ref 11.7–15.5)
LDLC SERPL CALC-MCNC: 159 MG/DL (CALC)
LYMPHOCYTES # BLD AUTO: 1725 CELLS/UL (ref 850–3900)
LYMPHOCYTES NFR BLD AUTO: 35.2 %
MCH RBC QN AUTO: 31.2 PG (ref 27–33)
MCHC RBC AUTO-ENTMCNC: 34.8 G/DL (ref 32–36)
MCV RBC AUTO: 89.7 FL (ref 80–100)
MONOCYTES # BLD AUTO: 363 CELLS/UL (ref 200–950)
MONOCYTES NFR BLD AUTO: 7.4 %
NEUTROPHILS # BLD AUTO: 2249 CELLS/UL (ref 1500–7800)
NEUTROPHILS NFR BLD AUTO: 45.9 %
NONHDLC SERPL-MCNC: 183 MG/DL (CALC)
PLATELET # BLD AUTO: 254 THOUSAND/UL (ref 140–400)
PMV BLD REES-ECKER: 11.2 FL (ref 7.5–12.5)
POTASSIUM SERPL-SCNC: 3.9 MMOL/L (ref 3.5–5.3)
PROT SERPL-MCNC: 6.8 G/DL (ref 6.1–8.1)
RBC # BLD AUTO: 4.36 MILLION/UL (ref 3.8–5.1)
SODIUM SERPL-SCNC: 140 MMOL/L (ref 135–146)
TRIGL SERPL-MCNC: 119 MG/DL
TSH SERPL-ACNC: 2.11 MIU/L (ref 0.4–4.5)
WBC # BLD AUTO: 4.9 THOUSAND/UL (ref 3.8–10.8)

## 2024-01-24 ENCOUNTER — OFFICE VISIT (OUTPATIENT)
Dept: NEUROLOGY | Facility: CLINIC | Age: 59
End: 2024-01-24
Payer: COMMERCIAL

## 2024-01-24 VITALS
BODY MASS INDEX: 23.71 KG/M2 | HEART RATE: 111 BPM | SYSTOLIC BLOOD PRESSURE: 125 MMHG | DIASTOLIC BLOOD PRESSURE: 66 MMHG | WEIGHT: 138.9 LBS | OXYGEN SATURATION: 100 % | HEIGHT: 64 IN | TEMPERATURE: 98.6 F

## 2024-01-24 DIAGNOSIS — R20.2 PARESTHESIAS: ICD-10-CM

## 2024-01-24 DIAGNOSIS — G44.86 CERVICOGENIC HEADACHE: ICD-10-CM

## 2024-01-24 DIAGNOSIS — Q28.3 CAVERNOUS MALFORMATION: Primary | ICD-10-CM

## 2024-01-24 PROCEDURE — 99214 OFFICE O/P EST MOD 30 MIN: CPT | Performed by: NURSE PRACTITIONER

## 2024-01-24 NOTE — ASSESSMENT & PLAN NOTE
Patient seen for complaints of numbness and tingling of the tongue, lips, mouth-facial symptoms did improve with b12 supplementation, she remains on oral b12. She continues to note episodic paresthesias of the roof of her mouth and soft palate on the right with high intensity exercise. Unfortunately she had a knee injury and has been limited in her exercise and therefore has had less symptoms recently. We again discussed the use of gabapentin for symptoms however given the improvement in her symptoms opted to hold off on this. She denies any difficulty swallowing or with speech during episodes.    As part of her work-up she did have repeat MRI brain which did show a 0.3 cm cerebral cavernous malformation in right posterior frontal lobe, most recent imaging in 8/2023 was stable, plan to repeat at 6 month interval to ensure continued stability.         Plan for follow up in 6-8 months. To contact the office sooner with any concerns or worsening symptoms.

## 2024-01-24 NOTE — PROGRESS NOTES
Patient ID: Yuridia Santos is a 58 y.o. female.    Assessment/Plan:    Paresthesias  Patient seen for complaints of numbness and tingling of the tongue, lips, mouth-facial symptoms did improve with b12 supplementation, she remains on oral b12. She continues to note episodic paresthesias of the roof of her mouth and soft palate on the right with high intensity exercise. Unfortunately she had a knee injury and has been limited in her exercise and therefore has had less symptoms recently. We again discussed the use of gabapentin for symptoms however given the improvement in her symptoms opted to hold off on this. She denies any difficulty swallowing or with speech during episodes.    As part of her work-up she did have repeat MRI brain which did show a 0.3 cm cerebral cavernous malformation in right posterior frontal lobe, most recent imaging in 8/2023 was stable, plan to repeat at 6 month interval to ensure continued stability.         Plan for follow up in 6-8 months. To contact the office sooner with any concerns or worsening symptoms.          Cervicogenic headache  Patient with symptoms of right-sided neck pain and headache that radiates to her right eye once every few months, she does have symptoms of photophobia and vertigo with this as well. She has found the use of cyclobenzaprine as needed to be beneficial, plan to continue. We did discuss if headache do worsening gabapentin may be helpful in this situation as well.       She did have MRI cspine performed that did show cervical spondylosis and some mild spinal stenosis and for minimal narrowing, she did see neurosurgery however does not want to undergo surgical intervention at this time.       Diagnoses and all orders for this visit:    Cavernous malformation  -     MRI brain IAC wo and w contrast; Future    Paresthesias    Cervicogenic headache           Subjective:    HPI    Yuridia Santos is a 57 y.o. female with PMHx of bilaterally laminectomy and  diskectomy 4/21 L4-L5 emergently for cauda equina.  She presents today for follow-up visit.     Per chart review, was seen 12/2021 in which she was seen for imbalance like sensation.  She reported a few months following her laminectomy she felt a sensation of the ground moving like Jell-O while walking.  Also noted is been the sensation when looking down.  Symptoms varied in severity.  Also noted that looking at computer and phone would make her symptoms worse. Previously saw ENT and was diagnosed with PPPD (persistent postural perceptual dizziness)      Of note, Sister has been having ataxic symptoms for the last 2-3 years. Diagnosed at [facility], with spinocerebellar ataxia, she also has POTS, HATS. Sister had genetic testing and was negative for any spinocerebellar ataxia genes. No other family members with known neurological history. Per the above evaluation 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.      More recently she noted paresthesias of the tongue lips and mouth in which repeat brain MRI was ordered, she did have low B12 in which she was started on supplementation.     Last office visit 5/2023 in which she was to have repeat brain MRI        Interval history:  She did have a knee injury and was non-wearing bearing and therefore wasn't as active. She did feel that during this time her dizziness was minimal. Now that she is back to walking she has felt return of this.    She continues with numbness and tingling in the tongue down to the throat (soft palate) on the right side -this was also less when she was less active. She did find when she would do some more aerobic water exercise she would have mild symptoms. It would not last as long as previous. She hasn't felt facial numbness recently.   NO difficulty with swallowing or speech.       She denies any other numbness or tingling.     She is not back to her previous exercises status.       She still has right sided headaches at times, starts on the right  "occpital to the right eye, temple. Worse pain is right occipital. She does use cylcobenazprine if she feels neck tightness and/or headaches and it helps.   Can go several months without headaches, then may have 2 headaches in one month. Improve with OTC meds.     Recent b12 586 1/2023          Prior workup:  MRI brain 10/2021: No acute intracranial pathology. Tiny right posterior frontal cavernous malformation.  MRI brain 10/2022: No acute intracranial pathology.     Chronic, nonemergent findings above. Similar, minimal, focal patchy periventricular and subcortical white matter foci of abnormal T2 and FLAIR hyperintensity are nonspecific, but usually secondary to changes of microangiopathy.     MRI brain 1/2023:    - No acute intracranial abnormality.     - Small prominent extra-axial space in inferior aspect of right cerebellopontine angle near right jugular foramen, could represent small arachnoid cyst.     - 0.3 cm cerebral cavernous malformation in right posterior frontal lobe (lateral aspect of precentral gyrus), more pronounced than prior exam.    MRI brain with IAC 8/2023: No interval change since prior examination. No enhancing CP angle mass lesions identified.     Stable right cerebral hemisphere cavernoma as described above.     Stable prominent CSF at the right cerebellomedullary angle suspicious for a small arachnoid cyst.     MRI cervical spine 12/2022: Cervical spondylosis associated with some spinal canal and neural foraminal narrowing most notably at the C5-C6 level.        Labs: 9/2022 sjogrens, b6, folate normal. b12 229  10/2021 A1c 5.9, TSH normal  6/2022 rheumatoid factor, uric acid, sed rate, CRP, NEY, Lyme all normal, negative         Objective:    Blood pressure 125/66, pulse (!) 111, temperature 98.6 °F (37 °C), temperature source Temporal, height 5' 4\" (1.626 m), weight 63 kg (138 lb 14.4 oz), SpO2 100%, not currently breastfeeding.    Physical Exam  Constitutional:       General: She is " awake.   Eyes:      General: Lids are normal.      Extraocular Movements: Extraocular movements intact.      Pupils: Pupils are equal, round, and reactive to light.   Neurological:      Mental Status: She is alert.      Motor: Motor strength is normal.     Deep Tendon Reflexes:      Reflex Scores:       Bicep reflexes are 2+ on the right side and 2+ on the left side.       Brachioradialis reflexes are 2+ on the right side and 2+ on the left side.       Patellar reflexes are 3+ on the right side and 3+ on the left side.       Achilles reflexes are 2+ on the right side and 2+ on the left side.  Psychiatric:         Speech: Speech normal.         Neurological Exam  Mental Status  Awake and alert. Oriented to person, place, time and situation. Speech is normal. Language is fluent with no aphasia.    Cranial Nerves  CN II: Visual fields full to confrontation.  CN III, IV, VI: Extraocular movements intact bilaterally. Normal lids and orbits bilaterally. Pupils equal round and reactive to light bilaterally.  CN V: Facial sensation is normal.  CN VII: Full and symmetric facial movement.  CN VIII: Hearing is normal.  CN IX, X: Palate elevates symmetrically. Normal gag reflex.  CN XI: Shoulder shrug strength is normal.  CN XII: Tongue midline without atrophy or fasciculations.  Endorsed diminished sensation in the posterior portion of the right palate.    Motor  Normal muscle bulk throughout. No fasciculations present. Strength is 5/5 throughout all four extremities.  Muscle tension and hypertrophy of the right sided cervical spine muscles.    Sensory  Light touch is normal in upper and lower extremities. Pinprick is normal in upper and lower extremities. Temperature is normal in upper and lower extremities. Vibration abnormality: Decreased vibratory R big toe, but otherwise normal throughout..     Reflexes                                            Right                      Left  Brachioradialis                    2+                          2+  Biceps                                 2+                         2+  Patellar                                3+                         3+  Achilles                                2+                         2+  Right Plantar: downgoing  Left Plantar: downgoing    Right pathological reflexes: Isabelle's absent. Ankle clonus absent.  Left pathological reflexes: Isabelle's absent. Ankle clonus absent.    Coordination  Right: Finger-to-nose normal. Heel-to-shin normal.Left: Finger-to-nose normal. Heel-to-shin normal.    Gait  Casual gait is normal including stance, stride, and arm swing. Able to rise from chair without using arms.        ROS:    Review of Systems   Constitutional:  Negative for appetite change, fatigue and fever.   HENT: Negative.  Negative for hearing loss, tinnitus, trouble swallowing and voice change.    Eyes: Negative.  Negative for photophobia, pain and visual disturbance.   Respiratory: Negative.  Negative for shortness of breath.    Cardiovascular: Negative.  Negative for palpitations.   Gastrointestinal: Negative.  Negative for nausea and vomiting.   Endocrine: Negative.  Negative for cold intolerance.   Genitourinary: Negative.  Negative for dysuria, frequency and urgency.   Musculoskeletal:  Negative for back pain, gait problem, myalgias, neck pain and neck stiffness.        Right jaw pain     Skin: Negative.  Negative for rash.   Allergic/Immunologic: Negative.    Neurological:  Positive for dizziness (when patient excerises), numbness (right sided facial and lip numbness) and headaches (right sided- once every 6 weeks). Negative for tremors, seizures, syncope, facial asymmetry, speech difficulty, weakness and light-headedness.   Hematological: Negative.  Does not bruise/bleed easily.   Psychiatric/Behavioral: Negative.  Negative for confusion, hallucinations and sleep disturbance.    All other systems reviewed and are negative.

## 2024-01-24 NOTE — ASSESSMENT & PLAN NOTE
Patient with symptoms of right-sided neck pain and headache that radiates to her right eye once every few months, she does have symptoms of photophobia and vertigo with this as well. She has found the use of cyclobenzaprine as needed to be beneficial, plan to continue. We did discuss if headache do worsening gabapentin may be helpful in this situation as well.       She did have MRI cspine performed that did show cervical spondylosis and some mild spinal stenosis and for minimal narrowing, she did see neurosurgery however does not want to undergo surgical intervention at this time.

## 2024-01-27 ENCOUNTER — HOSPITAL ENCOUNTER (OUTPATIENT)
Dept: CT IMAGING | Facility: HOSPITAL | Age: 59
Discharge: HOME | End: 2024-01-27

## 2024-01-27 DIAGNOSIS — Z82.49 FAMILY HISTORY OF EARLY CAD: ICD-10-CM

## 2024-01-27 PROCEDURE — G1004 CDSM NDSC: HCPCS

## 2024-01-27 PROCEDURE — 75571 CT HRT W/O DYE W/CA TEST: CPT

## 2024-02-09 DIAGNOSIS — K21.9 GASTROESOPHAGEAL REFLUX DISEASE WITHOUT ESOPHAGITIS: ICD-10-CM

## 2024-02-09 RX ORDER — OMEPRAZOLE 20 MG/1
20 CAPSULE, DELAYED RELEASE ORAL DAILY
Qty: 90 CAPSULE | Refills: 1 | Status: SHIPPED | OUTPATIENT
Start: 2024-02-09

## 2024-02-29 ENCOUNTER — HOSPITAL ENCOUNTER (OUTPATIENT)
Dept: MAMMOGRAPHY | Facility: MEDICAL CENTER | Age: 59
Discharge: HOME | End: 2024-02-29
Payer: COMMERCIAL

## 2024-02-29 VITALS — BODY MASS INDEX: 23.71 KG/M2 | WEIGHT: 138.89 LBS | HEIGHT: 64 IN

## 2024-02-29 DIAGNOSIS — Z12.31 ENCOUNTER FOR SCREENING MAMMOGRAM FOR MALIGNANT NEOPLASM OF BREAST: ICD-10-CM

## 2024-02-29 PROCEDURE — 77067 SCR MAMMO BI INCL CAD: CPT

## 2024-02-29 PROCEDURE — 77063 BREAST TOMOSYNTHESIS BI: CPT

## 2024-03-01 ENCOUNTER — HOSPITAL ENCOUNTER (OUTPATIENT)
Dept: MRI IMAGING | Facility: HOSPITAL | Age: 59
End: 2024-03-01
Payer: COMMERCIAL

## 2024-03-01 DIAGNOSIS — Q28.3 CAVERNOUS MALFORMATION: ICD-10-CM

## 2024-03-01 PROCEDURE — 70553 MRI BRAIN STEM W/O & W/DYE: CPT

## 2024-03-01 PROCEDURE — A9585 GADOBUTROL INJECTION: HCPCS | Performed by: NURSE PRACTITIONER

## 2024-03-01 PROCEDURE — G1004 CDSM NDSC: HCPCS

## 2024-03-01 RX ORDER — GADOBUTROL 604.72 MG/ML
6 INJECTION INTRAVENOUS
Status: COMPLETED | OUTPATIENT
Start: 2024-03-01 | End: 2024-03-01

## 2024-03-01 RX ADMIN — GADOBUTROL 6 ML: 604.72 INJECTION INTRAVENOUS at 08:31

## 2024-04-11 ENCOUNTER — OFFICE VISIT (OUTPATIENT)
Dept: FAMILY MEDICINE CLINIC | Facility: CLINIC | Age: 59
End: 2024-04-11
Payer: COMMERCIAL

## 2024-04-11 VITALS
TEMPERATURE: 97.7 F | OXYGEN SATURATION: 99 % | SYSTOLIC BLOOD PRESSURE: 122 MMHG | BODY MASS INDEX: 22.62 KG/M2 | DIASTOLIC BLOOD PRESSURE: 86 MMHG | HEART RATE: 103 BPM | WEIGHT: 132.5 LBS | HEIGHT: 64 IN

## 2024-04-11 DIAGNOSIS — J01.90 ACUTE BACTERIAL SINUSITIS: Primary | ICD-10-CM

## 2024-04-11 DIAGNOSIS — B96.89 ACUTE BACTERIAL SINUSITIS: Primary | ICD-10-CM

## 2024-04-11 PROCEDURE — 99213 OFFICE O/P EST LOW 20 MIN: CPT | Performed by: FAMILY MEDICINE

## 2024-04-11 RX ORDER — AMOXICILLIN AND CLAVULANATE POTASSIUM 875; 125 MG/1; MG/1
1 TABLET, FILM COATED ORAL EVERY 12 HOURS SCHEDULED
Qty: 14 TABLET | Refills: 0 | Status: SHIPPED | OUTPATIENT
Start: 2024-04-11 | End: 2024-04-15 | Stop reason: SINTOL

## 2024-04-11 NOTE — PROGRESS NOTES
Name: Yuridia Santos      : 1965      MRN: [id number]  Encounter Provider: Alondra Wright DO  Encounter Date: 2024   Encounter department: [facility]    Assessment & Plan     1. Acute bacterial sinusitis  -     amoxicillin-clavulanate (AUGMENTIN) 875-125 mg per tablet; Take 1 tablet by mouth every 12 (twelve) hours for 7 days         Subjective      Chief Complaint   Patient presents with   • Headache     Started 2 weeks ago   • Nasal Congestion     All other Sx started a week ago   • Sinus Problem   • Post Nasal Drip      Started about 2 weeks ago with headaches, then congestion, mucous, ears aching    Sinus Problem  This is a recurrent problem. Associated symptoms include chills, congestion, coughing, ear pain, headaches and sinus pressure. Treatments tried: cold and flu.     Review of Systems   Constitutional:  Positive for chills.   HENT:  Positive for congestion, ear pain and sinus pressure.    Respiratory:  Positive for cough.    Neurological:  Positive for headaches.       Current Outpatient Medications on File Prior to Visit   Medication Sig   • albuterol (PROVENTIL HFA,VENTOLIN HFA) 90 mcg/act inhaler INHALE 2 PUFFS BY MOUTH EVERY 4 HOURS AS NEEDED VIA SPACER   • ALPRAZolam (XANAX) 0.25 mg tablet Take 1 tablet (0.25 mg total) by mouth daily at bedtime as needed for anxiety   • budesonide-formoterol (SYMBICORT) 80-4.5 MCG/ACT inhaler Inhale 2 puffs 2 (two) times a day   • Cyanocobalamin (B-12) 5000 MCG SUBL Place 5,000 mcg under the tongue daily   • cyclobenzaprine (FLEXERIL) 10 mg tablet Take 1 tablet (10 mg total) by mouth 3 (three) times a day as needed for muscle spasms   • hyoscyamine (ANASPAZ,LEVSIN) 0.125 MG tablet Take 1 tablet (0.125 mg total) by mouth every 4 (four) hours as needed for cramping   • lidocaine (XYLOCAINE) 5 % ointment Apply 1 application. topically as needed   • nitroglycerin (NITROSTAT) 0.4 mg SL tablet Place 1 tablet (0.4 mg total) under the  "tongue every 5 (five) minutes as needed (esophageal spasm)   • omeprazole (PriLOSEC) 20 mg delayed release capsule TAKE 1 CAPSULE BY MOUTH EVERY DAY   • levalbuterol (XOPENEX HFA) 45 mcg/act inhaler Inhale 2 puffs every 4 (four) hours as needed (Patient not taking: Reported on 12/29/2023)   • Methylcobalamin (B-12) 5000 MCG TBDP Take 5,000 mcg by mouth in the morning (Patient not taking: Reported on 9/21/2023)       Objective     /86 (BP Location: Left arm, Patient Position: Sitting, Cuff Size: Standard)   Pulse 103   Temp 97.7 °F (36.5 °C) (Temporal)   Ht 5' 4\" (1.626 m)   Wt 60.1 kg (132 lb 8 oz)   LMP  (LMP Unknown)   SpO2 99%   BMI 22.74 kg/m²     Physical Exam  Vitals and nursing note reviewed.   Constitutional:       General: She is not in acute distress.  HENT:      Head: Normocephalic.      Ears:      Comments: TM's dull     Nose: Congestion present.      Right Turbinates: Enlarged.      Left Turbinates: Enlarged.      Right Sinus: Maxillary sinus tenderness and frontal sinus tenderness present.      Left Sinus: Maxillary sinus tenderness and frontal sinus tenderness present.      Mouth/Throat:      Comments: Post nasal drainage  Cardiovascular:      Rate and Rhythm: Normal rate and regular rhythm.   Pulmonary:      Effort: Pulmonary effort is normal.      Breath sounds: Normal breath sounds.   Lymphadenopathy:      Cervical: Cervical adenopathy present.   Skin:     General: Skin is warm and dry.   Neurological:      Mental Status: She is alert and oriented to person, place, and time.   Psychiatric:         Mood and Affect: Mood normal.       Alondra Wright, DO    "

## 2024-04-15 DIAGNOSIS — B96.89 ACUTE BACTERIAL SINUSITIS: Primary | ICD-10-CM

## 2024-04-15 DIAGNOSIS — J01.90 ACUTE BACTERIAL SINUSITIS: Primary | ICD-10-CM

## 2024-04-15 RX ORDER — DOXYCYCLINE HYCLATE 100 MG/1
100 CAPSULE ORAL EVERY 12 HOURS SCHEDULED
Qty: 14 CAPSULE | Refills: 0 | Status: SHIPPED | OUTPATIENT
Start: 2024-04-15 | End: 2024-04-22

## 2024-05-06 ENCOUNTER — CLINICAL SUPPORT (OUTPATIENT)
Dept: FAMILY MEDICINE CLINIC | Facility: CLINIC | Age: 59
End: 2024-05-06
Payer: COMMERCIAL

## 2024-05-06 DIAGNOSIS — Z23 ENCOUNTER FOR IMMUNIZATION: Primary | ICD-10-CM

## 2024-05-06 PROCEDURE — 90471 IMMUNIZATION ADMIN: CPT

## 2024-05-06 PROCEDURE — 90750 HZV VACC RECOMBINANT IM: CPT

## 2024-05-16 DIAGNOSIS — G89.29 CHRONIC PAIN OF LEFT KNEE: Primary | ICD-10-CM

## 2024-05-16 DIAGNOSIS — M25.562 CHRONIC PAIN OF LEFT KNEE: Primary | ICD-10-CM

## 2024-05-24 ENCOUNTER — APPOINTMENT (OUTPATIENT)
Dept: RADIOLOGY | Facility: MEDICAL CENTER | Age: 59
End: 2024-05-24
Payer: COMMERCIAL

## 2024-05-24 ENCOUNTER — OFFICE VISIT (OUTPATIENT)
Dept: OBGYN CLINIC | Facility: MEDICAL CENTER | Age: 59
End: 2024-05-24
Payer: COMMERCIAL

## 2024-05-24 VITALS
WEIGHT: 139.2 LBS | BODY MASS INDEX: 23.76 KG/M2 | HEIGHT: 64 IN | DIASTOLIC BLOOD PRESSURE: 82 MMHG | HEART RATE: 101 BPM | SYSTOLIC BLOOD PRESSURE: 121 MMHG

## 2024-05-24 DIAGNOSIS — M89.8X6 PAIN IN LEFT TIBIA: ICD-10-CM

## 2024-05-24 DIAGNOSIS — M17.12 PRIMARY OSTEOARTHRITIS OF LEFT KNEE: Primary | ICD-10-CM

## 2024-05-24 DIAGNOSIS — Z01.89 ENCOUNTER FOR LOWER EXTREMITY COMPARISON IMAGING STUDY: ICD-10-CM

## 2024-05-24 DIAGNOSIS — M17.12 PRIMARY OSTEOARTHRITIS OF LEFT KNEE: ICD-10-CM

## 2024-05-24 PROCEDURE — 73560 X-RAY EXAM OF KNEE 1 OR 2: CPT

## 2024-05-24 PROCEDURE — 73590 X-RAY EXAM OF LOWER LEG: CPT

## 2024-05-24 PROCEDURE — 99214 OFFICE O/P EST MOD 30 MIN: CPT | Performed by: ORTHOPAEDIC SURGERY

## 2024-05-24 PROCEDURE — 73564 X-RAY EXAM KNEE 4 OR MORE: CPT

## 2024-05-24 NOTE — PROGRESS NOTES
Assessment/Plan:  1. Primary osteoarthritis of left knee      No orders of the defined types were placed in this encounter.    -Ms. Santos has moderate L knee arthritis  -updated XR L knee and XR L tib/fib were obtained and reviewed today  -recommend activity modification which patient is agreeable, she will focus more on biking on low resistance, pool for exercise-limit walking  -she can move forward with physical therapy for left knee  -referral placed again for gel injections to repeat at end of June/early July  -ibuprofen and tylenol as needed for pain  -if patient continues with pain after next set of gel injections then would get updated x-ray of left knee  -also recommend seeing Dr Garcia who she saw at [facility] for left sided weakness,atrophy of left calf.   -can consider medrol dose chiquita for vacation at the end of august    Return for once gel injections approved .    I answered all of the patient's questions during the visit and provided education of the patient's condition during the visit.  The patient verbalized understanding of the information given and agrees with the plan.  This note was dictated using [employer] software.  It may contain errors including improperly dictated words.  Please contact physician directly for any questions.      Subjective   Chief Complaint: No chief complaint on file.      Yuridia Santos is a 58 y.o. female who presents for follow up for of her left knee. Treating for moderate arthritis. She finished gel injections 12/29/24. She states she did get relief from the series but not as much as she hoped. She has had CSI last year with no significant relief. Her pain is primarily weight bearing with prolonged walking, standing. She states she can only take 100 steps before pain sets in and then worsens the more she is active. Standing 15 minutes only. Pain primarily medial aspect of knee.   Her main issue is pain not instability or locking. She does have MRI in history showing small  areas of  MFC full thickness cartilage loss. She states she does bike and has no pain in her knee with this activity. She is going to start physical therapy in near future for left knee.   She is using ibuprofen, tylenol as needed for pain.   She does have a history of cauda equina syndrome that resulted in emergent surgery performed by Dr. Garcia at [facility]. We have recommended seeing Dr Garcia again to see if atrophy in left calf is secondary to nerve issue in lumbar spine.     Review of Systems  ROS:    See [facility] for musculoskeletal review.   All other systems reviewed are negative     History:  Past Medical History:   Diagnosis Date    Allergic 1998    Asthma     Dysuria 2012    Esophageal spasm 2012    Resolved:  2017    GERD (gastroesophageal reflux disease)     H/O oral surgery     Migraine     Peripheral neuropathy     Shingles 2016    Skin disorder 2013    Resolved:  2017     Past Surgical History:   Procedure Laterality Date    BONE GRAFT      In mouth after tooth extraction     SECTION      COLONOSCOPY      LAMINECTOMY Bilateral     L4 and L5    MOUTH SURGERY      SPINE SURGERY  21    Bilateral laminectomy and 2 disc repairs    TOOTH EXTRACTION      WISDOM TOOTH EXTRACTION       Social History   Social History     Substance and Sexual Activity   Alcohol Use Yes    Alcohol/week: 2.0 standard drinks of alcohol    Types: 1 Glasses of wine, 1 Cans of beer per week    Comment: Socially     Social History     Substance and Sexual Activity   Drug Use No     Social History     Tobacco Use   Smoking Status Never    Passive exposure: Never   Smokeless Tobacco Never     Family History:   Family History   Problem Relation Age of Onset    Diabetes Mother     Polymyalgia rheumatica Mother     Arthritis Mother     Hypertension Mother     Alcohol abuse Father     Asthma Father     Heart attack Father     Thyroid cancer Son 22    Depression Daughter     Anxiety disorder  "Daughter     Skin cancer Sister 42    Dementia Maternal Grandmother     Psychiatric Illness Maternal Grandmother     Parkinsonism Maternal Grandfather     No Known Problems Paternal Grandmother     No Known Problems Paternal Grandfather     No Known Problems Maternal Aunt     No Known Problems Paternal Aunt     Ataxia Sister     Asthma Son     Cancer Son     Completed Suicide  Cousin     Mental illness Neg Hx     Substance Abuse Neg Hx        Meds/Allergies   Not in a hospital admission.  Allergies   Allergen Reactions    Azithromycin Swelling     Other reaction(s): swelling    Bactrim [Sulfamethoxazole-Trimethoprim] Swelling    Codeine Swelling    Sulfa Antibiotics Hives    Sulfasalazine Hives          Objective     /82   Pulse 101   Ht 5' 4\" (1.626 m)   Wt 63.1 kg (139 lb 3.2 oz)   LMP  (LMP Unknown)   BMI 23.89 kg/m²      PE:  AAOx 3  WDWN  Hearing intact, no drainage from eyes  no audible wheezing  no abdominal distension  LE compartments soft, skin intact    Ortho Exam:  left Knee:   No erythema  no swelling  no effusion  no warmth  AROM: 0-130  Stable to varus/valgus stress    Imaging Studies: x-ray of left tib-fib demonstrates no osseus abnormalities, XR L knee shows moderate arthritis medial compartment of knee with osteophyte formation and joint space narrowing     Scribe Attestation      I,:  Bam Andrew am acting as a scribe while in the presence of the attending physician.:       I,:  Shannan Shepherd DO personally performed the services described in this documentation    as scribed in my presence.:                       "

## 2024-05-31 ENCOUNTER — EVALUATION (OUTPATIENT)
Dept: PHYSICAL THERAPY | Facility: CLINIC | Age: 59
End: 2024-05-31
Payer: COMMERCIAL

## 2024-05-31 DIAGNOSIS — G89.29 CHRONIC PAIN OF LEFT KNEE: Primary | ICD-10-CM

## 2024-05-31 DIAGNOSIS — M25.562 CHRONIC PAIN OF LEFT KNEE: Primary | ICD-10-CM

## 2024-05-31 PROCEDURE — 97110 THERAPEUTIC EXERCISES: CPT | Performed by: PHYSICAL THERAPIST

## 2024-05-31 PROCEDURE — 97162 PT EVAL MOD COMPLEX 30 MIN: CPT | Performed by: PHYSICAL THERAPIST

## 2024-05-31 NOTE — PROGRESS NOTES
PT Evaluation     Today's date: 2024  Patient name: Yuridia Santos  : 1965  MRN: [id number]  Referring provider: Alondra Wright DO  Dx:   Encounter Diagnosis     ICD-10-CM    1. Chronic pain of left knee  M25.562 Ambulatory Referral to Physical Therapy    G89.29                      Assessment  Impairments: abnormal gait, abnormal muscle tone, abnormal or restricted ROM, abnormal movement, activity intolerance, impaired balance, impaired physical strength, lacks appropriate home exercise program, pain with function and weight-bearing intolerance    Assessment details: Yuridia Santos is a 58 y.o. female presenting to physical therapy with chronic left knee pain and presents with pain, decreased strength, and decreased activity tolerance.  Assessment reveals.  Secondary to these impairments, patient has increased difficulty performing ADL's, fitness/hiking and household chores.  Yuridia would benefit from skilled PT to address these issues and maximize function.  Thank you for the referral.    Understanding of Dx/Px/POC: excellent     Prognosis: excellent    Goals  STG (4 weeks)  1. Patient will be independent with HEP  2. Decrease pain at worst by 2 points on NPRS  3. Patient will demonstrate ability to walk > 5 minutes outdoors without pain  LTG (8 weeks)  1. Decrease pain at worst from 4 points on NPRS  2. Increase dynamic valgus control with lateral step down to reduced patellofemoral pain/stress  3. Patient will demonstrate ability to ambulate downhill without pain  4. Increase FOTO score > or equal to expected outcome    Plan  Patient would benefit from: skilled PT    Planned therapy interventions: joint mobilization, manual therapy, neuromuscular re-education, patient education, strengthening, stretching, therapeutic exercise, home exercise program, ADL training and balance    Frequency: 1x week  Duration in weeks: 8  Treatment plan discussed with: patient        Subjective Evaluation    History of  Present Illness  Mechanism of injury: Patient reports to outpatient PT secondary to chronic left knee pain.  Patient states that the pain began while traveling on vacation > 1 year prior without specific injurious event but increased walking.  Patient notes that further testing demonstrates a tibial stress fracture and marrow edema and was non weight for 8 weeks but has been ambulating for greater than 4 months with significant discomfort in the medial knee, tibia and posterior knee.  Patient describes the pain as a deep strong ache which is worse with prolonged standing and walking and improved with rest.  Patient notes a history of CSI in the knee without relief, and gel injections with moderate relief but difficulty remains.  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  Patients goals for PT are to decrease the pain and return to PLOF which includes prolonged walking and hiking for a planned trip in September.          Not a recurrent problem   Patient Goals  Patient goals for therapy: increased strength, independence with ADLs/IADLs, return to sport/leisure activities, increased motion, decreased pain and decreased edema    Pain  Current pain ratin  At best pain ratin  At worst pain ratin  Quality: dull ache and radiating  Relieving factors: rest and ice  Aggravating factors: walking and standing  Progression: worsening    Social Support  Steps to enter house: yes  Stairs in house: yes   Lives in: multiple-level home    Hand dominance: right      Diagnostic Tests  X-ray: normal  Treatments  Previous treatment: immobilization  Current treatment: medication        Objective     Palpation   Left   No palpable tenderness to the distal biceps femoris.     Right   No palpable tenderness to the distal biceps femoris.     Tenderness   Left Knee   No tenderness in the ITB, lateral joint line, medial joint line, patellar  "tendon and pes anserinus.     Right Knee   No tenderness in the ITB, lateral joint line, medial joint line, patellar tendon and pes anserinus.     Active Range of Motion   Left Knee   Flexion: WFL  Extension: 5 degrees     Right Knee   Flexion: WFL  Extension: 5 degrees     Mobility   Patellar Mobility:   Left Knee   Hypomobile: left medial, left lateral, left superior and left inferior    Strength/Myotome Testing     Left Knee   Flexion: 4+  Extension: 4+  Quadriceps contraction: good    Right Knee   Flexion: 5  Extension: 5  Quadriceps contraction: good    Additional Strength Details  Gluteus medius: 4/5 B/L    Tests     Left Knee   Positive patellar compression and patella-femoral grind.   Negative Apley's compression, lateral Carlos and medial Carlos.     Ambulation   Weight-Bearing Status     Additional Weight-Bearing Status Details  Gait mechanics: normal gait speed and mechanics with B/L genu varus    Lateral step down test:    Good quality of movement on the right  Moderate quality of movement on the left - excess dynamic valgus             Precautions:   Patient Active Problem List   Diagnosis    Anxiety    Asthma    Mixed hyperlipidemia    Vitamin D deficiency    Lumbar back pain with radiculopathy affecting right lower extremity    Peroneal tendinitis    Sprain of MCL (medial collateral ligament) of knee    Tremor    Localized, primary osteoarthritis of hand    Paresthesias    Feeling of incomplete bladder emptying    Cervicogenic headache    Primary osteoarthritis of left knee           Manuals 5/31            L Patellar and quad tendon IASTM                                                    Neuro Re-Ed                                                                                                        Ther Ex             Recumbent bike warm up             Standing gastroc stretch             Prone quad stretch 3x20\"            Eccentric squats - 75% of weight on left 2x10            Wall sits to " failure             Side lying hip abduction             Walking review - avoid hills HEP                         Ther Activity                                       Gait Training                                       Modalities

## 2024-06-07 ENCOUNTER — OFFICE VISIT (OUTPATIENT)
Dept: PHYSICAL THERAPY | Facility: CLINIC | Age: 59
End: 2024-06-07
Payer: COMMERCIAL

## 2024-06-07 DIAGNOSIS — M25.562 CHRONIC PAIN OF LEFT KNEE: Primary | ICD-10-CM

## 2024-06-07 DIAGNOSIS — G89.29 CHRONIC PAIN OF LEFT KNEE: Primary | ICD-10-CM

## 2024-06-07 PROCEDURE — 97140 MANUAL THERAPY 1/> REGIONS: CPT | Performed by: PHYSICAL THERAPIST

## 2024-06-07 PROCEDURE — 97110 THERAPEUTIC EXERCISES: CPT | Performed by: PHYSICAL THERAPIST

## 2024-06-07 NOTE — PROGRESS NOTES
"Daily Note     Today's date: 2024  Patient name: Yuridia Santos  : 1965  MRN: [id number]  Referring provider: Alondra Wright DO  Dx:   Encounter Diagnosis     ICD-10-CM    1. Chronic pain of left knee  M25.562     G89.29                      Subjective: Patient reports HEP compliance and states that overall she is making progress toward pain reduction.  No increased pain noted with eccentrics.       Objective: See treatment diary below      Assessment: Patient demonstrates significant improvement in motor control with eccentric squat noting about 80% of body weight.  Updated HEP to include TE progressions per below and will work toward further resistance as tolerated in future sessions.      Plan: Continue per plan of care.      Precautions:   Patient Active Problem List   Diagnosis    Anxiety    Asthma    Mixed hyperlipidemia    Vitamin D deficiency    Lumbar back pain with radiculopathy affecting right lower extremity    Peroneal tendinitis    Sprain of MCL (medial collateral ligament) of knee    Tremor    Localized, primary osteoarthritis of hand    Paresthesias    Feeling of incomplete bladder emptying    Cervicogenic headache    Primary osteoarthritis of left knee           Manuals            L Patellar and quad tendon IASTM  8'                                                  Neuro Re-Ed                                                                                                        Ther Ex             Recumbent bike warm up  L3 5'           Standing gastroc stretch  3x30\"           Prone quad stretch 3x20\" 3x20\"           Eccentric squats - 75% of weight on left 2x10 2x10 80%           Wall sits to failure  3 sets           Side lying hip abduction  3x10 B/L           Walking review - avoid hills HEP            Monster walks  NV           BOSU squat isometrics                          Ther Activity                                       Gait Training                                     "   Modalities

## 2024-06-13 ENCOUNTER — OFFICE VISIT (OUTPATIENT)
Dept: PHYSICAL THERAPY | Facility: CLINIC | Age: 59
End: 2024-06-13
Payer: COMMERCIAL

## 2024-06-13 DIAGNOSIS — G89.29 CHRONIC PAIN OF LEFT KNEE: Primary | ICD-10-CM

## 2024-06-13 DIAGNOSIS — M25.562 CHRONIC PAIN OF LEFT KNEE: Primary | ICD-10-CM

## 2024-06-13 PROCEDURE — 97110 THERAPEUTIC EXERCISES: CPT | Performed by: PHYSICAL THERAPIST

## 2024-06-13 PROCEDURE — 97140 MANUAL THERAPY 1/> REGIONS: CPT | Performed by: PHYSICAL THERAPIST

## 2024-06-13 NOTE — PROGRESS NOTES
"Daily Note     Today's date: 2024  Patient name: Yuridia Santos  : 1965  MRN: [id number]  Referring provider: Alondra Wright DO  Dx:   Encounter Diagnosis     ICD-10-CM    1. Chronic pain of left knee  M25.562     G89.29                        Subjective: Patient states that she is making progress with improved tolerance to ambulation and remains compliant with her HEP.  Notes mild soreness following last treatment session.       Objective: See treatment diary below      Assessment: Improving quad control/stability able to progress to single leg 100% WB eccentric with limited valgus.  Progressed quad isometrics per below and was able to complete without pain or symptoms.  Overall patient continues to make steady progress toward goals.      Plan: Continue per plan of care.      Precautions:   Patient Active Problem List   Diagnosis    Anxiety    Asthma    Mixed hyperlipidemia    Vitamin D deficiency    Lumbar back pain with radiculopathy affecting right lower extremity    Peroneal tendinitis    Sprain of MCL (medial collateral ligament) of knee    Tremor    Localized, primary osteoarthritis of hand    Paresthesias    Feeling of incomplete bladder emptying    Cervicogenic headache    Primary osteoarthritis of left knee           Manuals           L Patellar and quad tendon IASTM  8' 8'                                                 Neuro Re-Ed                                                                                                        Ther Ex             Recumbent bike warm up  L3 5' L3 5'          Standing gastroc stretch  3x30\" 3x20\"          Prone quad stretch 3x20\" 3x20\" 3x20\"          Eccentric squats - 75% of weight on left 2x10 2x10 80% 2x10 100%          Wall sits to failure  3 sets 3 sets          Side lying hip abduction  3x10 B/L 3x10 B/L          Walking review - avoid hills HEP            Monster walks - pink to failure  NV 2 sets           BOSU squat isometrics   x5    "                    Ther Activity                                       Gait Training                                       Modalities

## 2024-06-21 ENCOUNTER — OFFICE VISIT (OUTPATIENT)
Dept: PHYSICAL THERAPY | Facility: CLINIC | Age: 59
End: 2024-06-21
Payer: COMMERCIAL

## 2024-06-21 DIAGNOSIS — G89.29 CHRONIC PAIN OF LEFT KNEE: Primary | ICD-10-CM

## 2024-06-21 DIAGNOSIS — M25.562 CHRONIC PAIN OF LEFT KNEE: Primary | ICD-10-CM

## 2024-06-21 PROCEDURE — 97140 MANUAL THERAPY 1/> REGIONS: CPT | Performed by: PHYSICAL THERAPIST

## 2024-06-21 PROCEDURE — 97110 THERAPEUTIC EXERCISES: CPT | Performed by: PHYSICAL THERAPIST

## 2024-06-21 NOTE — PROGRESS NOTES
"Daily Note     Today's date: 2024  Patient name: Yuridia Santos  : 1965  MRN: [id number]  Referring provider: Alondra Wright DO  Dx:   Encounter Diagnosis     ICD-10-CM    1. Chronic pain of left knee  M25.562     G89.29                        Subjective: Patient states that the knee continues to make progress.  Notes mild discomfort with lateral stepping but no residual symptoms upon completion of exercise.       Objective: See treatment diary below      Assessment: Patient demonstrates steady progress in terms of strength and progressions of sets/reps. Able to complete quad eccentric solely on single leg per improved strength and stability.  Secondary to steady progress, will perform re-eval next visit with potential discharge.        Plan: Continue per plan of care.      Precautions:   Patient Active Problem List   Diagnosis    Anxiety    Asthma    Mixed hyperlipidemia    Vitamin D deficiency    Lumbar back pain with radiculopathy affecting right lower extremity    Peroneal tendinitis    Sprain of MCL (medial collateral ligament) of knee    Tremor    Localized, primary osteoarthritis of hand    Paresthesias    Feeling of incomplete bladder emptying    Cervicogenic headache    Primary osteoarthritis of left knee           Manuals          L Patellar and quad tendon IASTM  8' 8' 8'                                                Neuro Re-Ed                                                                                                        Ther Ex             Recumbent bike warm up  L3 5' L3 5' L3 5'         Standing gastroc stretch  3x30\" 3x20\" 3x20\"         Prone quad stretch 3x20\" 3x20\" 3x20\" 3x20\"         Eccentric squats - 75% of weight on left 2x10 2x10 80% 2x10 100% 2x10 100%         Wall sits to failure  3 sets 3 sets 3 sets         Side lying hip abduction  3x10 B/L 3x10 B/L 3x10 B/L 1#         Walking review - avoid hills HEP            Monster walks - pink to failure  NV 2 " sets  2 sets         BOSU squat isometrics   x5 x5                      Ther Activity                                       Gait Training                                       Modalities

## 2024-06-27 ENCOUNTER — APPOINTMENT (OUTPATIENT)
Dept: PHYSICAL THERAPY | Facility: CLINIC | Age: 59
End: 2024-06-27
Payer: COMMERCIAL

## 2024-06-28 ENCOUNTER — APPOINTMENT (OUTPATIENT)
Dept: PHYSICAL THERAPY | Facility: CLINIC | Age: 59
End: 2024-06-28
Payer: COMMERCIAL

## 2024-07-01 ENCOUNTER — EVALUATION (OUTPATIENT)
Dept: PHYSICAL THERAPY | Facility: CLINIC | Age: 59
End: 2024-07-01
Payer: COMMERCIAL

## 2024-07-01 DIAGNOSIS — G89.29 CHRONIC PAIN OF LEFT KNEE: Primary | ICD-10-CM

## 2024-07-01 DIAGNOSIS — M25.562 CHRONIC PAIN OF LEFT KNEE: Primary | ICD-10-CM

## 2024-07-01 PROCEDURE — 97140 MANUAL THERAPY 1/> REGIONS: CPT | Performed by: PHYSICAL THERAPIST

## 2024-07-01 PROCEDURE — 97110 THERAPEUTIC EXERCISES: CPT | Performed by: PHYSICAL THERAPIST

## 2024-07-01 NOTE — PROGRESS NOTES
PT Re-Evaluation  and PT Discharge    Today's date: 2024  Patient name: Yuridia Santos  : 1965  MRN: [id number]  Referring provider: Alondra Wright DO  Dx:   Encounter Diagnosis     ICD-10-CM    1. Chronic pain of left knee  M25.562     G89.29                        Assessment  Impairments: abnormal gait, abnormal muscle tone, abnormal or restricted ROM, abnormal movement, activity intolerance, impaired balance, impaired physical strength, lacks appropriate home exercise program, pain with function and weight-bearing intolerance    Assessment details: Patient is a 59 y.o. year old female who attended physical therapy for 5 treatment sessions regarding chronic left knee pain. Patient reports 80% improvement at this time which correlates with FOTO scoring.  Patient has shown improvement throughout PT by demonstrating decreased pain, increased range of motion, increased strength, improved tolerance to activity, and improved gait/balance.  Secondary to achieving functional goals and independence with comprehensive Home Exercise Program, Yuridia will be discharged from PT at this time.  Thank you.      Understanding of Dx/Px/POC: excellent     Prognosis: excellent    Goals  STG (4 weeks)  1. Patient will be independent with HEP (MET)  2. Decrease pain at worst by 2 points on NPRS (MET)  3. Patient will demonstrate ability to walk > 5 minutes outdoors without pain (MET)  LTG (8 weeks)  1. Decrease pain at worst from 4 points on NPRS (MET)  2. Increase dynamic valgus control with lateral step down to reduced patellofemoral pain/stress (MET)  3. Patient will demonstrate ability to ambulate downhill without pain (PARTIALLY MET)  4. Increase FOTO score > or equal to expected outcome (MET)    Plan  Patient would benefit from: skilled PT    Planned therapy interventions: joint mobilization, manual therapy, neuromuscular re-education, patient education, strengthening, stretching, therapeutic exercise, home exercise  program, ADL training and balance    Frequency: 1x week  Duration in weeks: 8  Treatment plan discussed with: patient        Subjective Evaluation    History of Present Illness  Mechanism of injury: Patient reports to outpatient PT secondary to chronic left knee pain.  Patient states that the pain began while traveling on vacation > 1 year prior without specific injurious event but increased walking.  Patient notes that further testing demonstrates a tibial stress fracture and marrow edema and was non weight for 8 weeks but has been ambulating for greater than 4 months with significant discomfort in the medial knee, tibia and posterior knee.  Patient describes the pain as a deep strong ache which is worse with prolonged standing and walking and improved with rest.  Patient notes a history of CSI in the knee without relief, and gel injections with moderate relief but difficulty remains.  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  Patients goals for PT are to decrease the pain and return to PLOF which includes prolonged walking and hiking for a planned trip in September.    2024: Patient reports significant overall improvement since IE.  Notes much greater tolerance for standing and walking with decreased discomfort but some discomfort remains with quiet standing in full knee extension.  States that she has been compliant with her HEP and is confident in continued progress upon discharge.            Not a recurrent problem   Patient Goals  Patient goals for therapy: increased strength, independence with ADLs/IADLs, return to sport/leisure activities, increased motion, decreased pain and decreased edema    Pain  Current pain ratin  At best pain ratin  At worst pain ratin  Quality: dull ache and radiating  Relieving factors: rest and ice  Aggravating factors: walking and standing  Progression: worsening    Social  Support  Steps to enter house: yes  Stairs in house: yes   Lives in: multiple-level home    Hand dominance: right      Diagnostic Tests  X-ray: normal  Treatments  Previous treatment: immobilization  Current treatment: medication        Objective     Palpation   Left   No palpable tenderness to the distal biceps femoris.     Right   No palpable tenderness to the distal biceps femoris.     Tenderness   Left Knee   No tenderness in the ITB, lateral joint line, medial joint line, patellar tendon and pes anserinus.     Right Knee   No tenderness in the ITB, lateral joint line, medial joint line, patellar tendon and pes anserinus.     Active Range of Motion   Left Knee   Flexion: WFL  Extension: 3 degrees     Right Knee   Flexion: WFL  Extension: 3 degrees     Mobility   Patellar Mobility:   Left Knee   Hypomobile: left medial, left lateral, left superior and left inferior    Strength/Myotome Testing     Left Knee   Flexion: 4+  Extension: 4+  Quadriceps contraction: good    Right Knee   Flexion: 5  Extension: 5  Quadriceps contraction: good    Additional Strength Details  Gluteus medius: 4/5 B/L (4+/5 on L, 5/5 on R on 7/1/2024)    Tests     Left Knee   Positive patella-femoral grind.   Negative Apley's compression, lateral Carlos, medial Carlos and patellar compression.     Ambulation   Weight-Bearing Status     Additional Weight-Bearing Status Details  Gait mechanics: normal gait speed and mechanics with B/L genu varus    Lateral step down test:    Good quality of movement on the right  Moderate quality of movement on the left - excess dynamic valgus (good quality of movement on 7/1/2024 with mild valgus)             Precautions:   Patient Active Problem List   Diagnosis    Anxiety    Asthma    Mixed hyperlipidemia    Vitamin D deficiency    Lumbar back pain with radiculopathy affecting right lower extremity    Peroneal tendinitis    Sprain of MCL (medial collateral ligament) of knee    Tremor    Localized,  "primary osteoarthritis of hand    Paresthesias    Feeling of incomplete bladder emptying    Cervicogenic headache    Primary osteoarthritis of left knee         Manuals 5/31 6/7 6/13 6/21 7/1        L Patellar and quad tendon IASTM  8' 8' 8' 8'        Re-eval     15'                                  Neuro Re-Ed                                                                                                        Ther Ex             Recumbent bike warm up  L3 5' L3 5' L3 5' L3 5'        Standing gastroc stretch  3x30\" 3x20\" 3x20\"         Prone quad stretch 3x20\" 3x20\" 3x20\" 3x20\"         Eccentric squats - 75% of weight on left 2x10 2x10 80% 2x10 100% 2x10 100%         Wall sits to failure  3 sets 3 sets 3 sets         Side lying hip abduction  3x10 B/L 3x10 B/L 3x10 B/L 1#         Walking review - avoid hills HEP            Monster walks - pink to failure  NV 2 sets  2 sets         BOSU squat isometrics   x5 x5         HEP Review     10'        Ther Activity                                       Gait Training                                       Modalities                                         "

## 2024-08-01 ENCOUNTER — PROCEDURE VISIT (OUTPATIENT)
Dept: OBGYN CLINIC | Facility: MEDICAL CENTER | Age: 59
End: 2024-08-01
Payer: COMMERCIAL

## 2024-08-01 VITALS
SYSTOLIC BLOOD PRESSURE: 114 MMHG | HEIGHT: 64 IN | WEIGHT: 140 LBS | HEART RATE: 101 BPM | DIASTOLIC BLOOD PRESSURE: 76 MMHG | BODY MASS INDEX: 23.9 KG/M2

## 2024-08-01 DIAGNOSIS — M17.12 PRIMARY OSTEOARTHRITIS OF LEFT KNEE: Primary | ICD-10-CM

## 2024-08-01 PROCEDURE — 20610 DRAIN/INJ JOINT/BURSA W/O US: CPT | Performed by: ORTHOPAEDIC SURGERY

## 2024-08-01 NOTE — PROGRESS NOTES
1. Primary osteoarthritis of left knee          Patient has moderate left knee osteoarthritis.  Patient is here for her 1st injection of Orthovisc into the left knee.   Physical exam of the knee shows no effusion no ecchymosis.  Patient tolerated procedure follow up 1 week for left knee 2nd injection of orthovisc     Large joint arthrocentesis: L knee  Universal Protocol:  Consent: Verbal consent obtained.  Risks and benefits: risks, benefits and alternatives were discussed  Consent given by: patient  Patient understanding: patient states understanding of the procedure being performed  Site marked: the operative site was marked  Patient identity confirmed: verbally with patient  Supporting Documentation  Indications: pain   Procedure Details  Location: knee - L knee  Needle size: 22 G  Ultrasound guidance: no  Approach: anterolateral  Medications administered: 30 mg sodium hyaluronate 30 mg/2 mL    Patient tolerance: patient tolerated the procedure well with no immediate complications  Dressing:  Sterile dressing applied

## 2024-08-08 ENCOUNTER — PROCEDURE VISIT (OUTPATIENT)
Dept: OBGYN CLINIC | Facility: MEDICAL CENTER | Age: 59
End: 2024-08-08
Payer: COMMERCIAL

## 2024-08-08 VITALS
HEIGHT: 64 IN | HEART RATE: 106 BPM | WEIGHT: 138 LBS | SYSTOLIC BLOOD PRESSURE: 114 MMHG | DIASTOLIC BLOOD PRESSURE: 75 MMHG | BODY MASS INDEX: 23.56 KG/M2

## 2024-08-08 DIAGNOSIS — M17.12 PRIMARY OSTEOARTHRITIS OF LEFT KNEE: Primary | ICD-10-CM

## 2024-08-08 DIAGNOSIS — K21.9 GASTROESOPHAGEAL REFLUX DISEASE WITHOUT ESOPHAGITIS: ICD-10-CM

## 2024-08-08 PROCEDURE — 20610 DRAIN/INJ JOINT/BURSA W/O US: CPT | Performed by: ORTHOPAEDIC SURGERY

## 2024-08-08 NOTE — PROGRESS NOTES
1. Primary osteoarthritis of left knee          Patient has moderate left knee osteoarthritis.   Patient is here for her 2nd injection of orthovisc into the left knee.   Physical exam of the knee shows no effusion no ecchymosis.  Patient tolerated procedure follow up 1 week for left knee third injection of Orthovisc.    Large joint arthrocentesis: L knee  Universal Protocol:  Consent: Verbal consent obtained.  Risks and benefits: risks, benefits and alternatives were discussed  Consent given by: patient  Patient understanding: patient states understanding of the procedure being performed  Site marked: the operative site was marked  Patient identity confirmed: verbally with patient  Supporting Documentation  Indications: pain   Procedure Details  Location: knee - L knee  Needle size: 22 G  Ultrasound guidance: no  Approach: anterolateral  Medications administered: 30 mg sodium hyaluronate 30 mg/2 mL    Patient tolerance: patient tolerated the procedure well with no immediate complications  Dressing:  Sterile dressing applied

## 2024-08-09 RX ORDER — OMEPRAZOLE 20 MG/1
20 CAPSULE, DELAYED RELEASE ORAL DAILY
Qty: 90 CAPSULE | Refills: 1 | Status: SHIPPED | OUTPATIENT
Start: 2024-08-09

## 2024-08-14 DIAGNOSIS — F41.9 ANXIETY: ICD-10-CM

## 2024-08-15 ENCOUNTER — PROCEDURE VISIT (OUTPATIENT)
Dept: OBGYN CLINIC | Facility: MEDICAL CENTER | Age: 59
End: 2024-08-15
Payer: COMMERCIAL

## 2024-08-15 VITALS
SYSTOLIC BLOOD PRESSURE: 117 MMHG | WEIGHT: 139 LBS | HEART RATE: 108 BPM | DIASTOLIC BLOOD PRESSURE: 73 MMHG | HEIGHT: 64 IN | BODY MASS INDEX: 23.73 KG/M2

## 2024-08-15 DIAGNOSIS — M17.12 PRIMARY OSTEOARTHRITIS OF LEFT KNEE: Primary | ICD-10-CM

## 2024-08-15 PROCEDURE — 20610 DRAIN/INJ JOINT/BURSA W/O US: CPT | Performed by: ORTHOPAEDIC SURGERY

## 2024-08-15 RX ORDER — ALPRAZOLAM 0.25 MG
0.25 TABLET ORAL
Qty: 30 TABLET | Refills: 0 | Status: SHIPPED | OUTPATIENT
Start: 2024-08-15

## 2024-08-15 NOTE — PROGRESS NOTES
Patient has moderate left knee osteoarthritis.   Pain rated 3/10 today.   Patient is here for left knee orthovisc injection 3/3.  Tolerated the procedure well.   Post injection instructions reviewed.   Patient will call in 5 months for injection prior auth order.       Large joint arthrocentesis: L knee  Universal Protocol:  procedure performed by consultantConsent: Verbal consent obtained.  Risks and benefits: risks, benefits and alternatives were discussed  Consent given by: patient  Site marked: the operative site was marked  Supporting Documentation  Indications: pain   Procedure Details  Location: knee - L knee  Preparation: Patient was prepped and draped in the usual sterile fashion  Needle size: 22 G  Ultrasound guidance: no  Approach: anterolateral  Medications administered: 30 mg sodium hyaluronate 30 mg/2 mL    Patient tolerance: patient tolerated the procedure well with no immediate complications  Dressing:  Sterile dressing applied

## 2024-09-18 ENCOUNTER — OFFICE VISIT (OUTPATIENT)
Dept: NEUROLOGY | Facility: CLINIC | Age: 59
End: 2024-09-18
Payer: COMMERCIAL

## 2024-09-18 VITALS
SYSTOLIC BLOOD PRESSURE: 116 MMHG | DIASTOLIC BLOOD PRESSURE: 78 MMHG | BODY MASS INDEX: 23.7 KG/M2 | WEIGHT: 138.8 LBS | HEART RATE: 95 BPM | TEMPERATURE: 97.9 F | HEIGHT: 64 IN | OXYGEN SATURATION: 99 %

## 2024-09-18 DIAGNOSIS — M54.12 RADICULOPATHY, CERVICAL REGION: ICD-10-CM

## 2024-09-18 DIAGNOSIS — G44.86 CERVICOGENIC HEADACHE: ICD-10-CM

## 2024-09-18 DIAGNOSIS — Q28.3 CAVERNOUS MALFORMATION: Primary | ICD-10-CM

## 2024-09-18 DIAGNOSIS — R20.2 PARESTHESIAS: ICD-10-CM

## 2024-09-18 PROCEDURE — 99214 OFFICE O/P EST MOD 30 MIN: CPT | Performed by: NURSE PRACTITIONER

## 2024-09-18 RX ORDER — CYCLOBENZAPRINE HCL 10 MG
10 TABLET ORAL 3 TIMES DAILY PRN
Qty: 30 TABLET | Refills: 0 | Status: SHIPPED | OUTPATIENT
Start: 2024-09-18

## 2024-09-18 NOTE — ASSESSMENT & PLAN NOTE
Patient with symptoms of right-sided neck pain and headache that radiates to her right eye once every few months, she does have symptoms of photophobia and vertigo with this as well.  Does feel cyclobenzaprine is helpful and we will plan to continue.  Would consider gabapentin if these became a more regular occurrence.      She did have MRI cspine performed that did show cervical spondylosis and some mild spinal stenosis and for minimal narrowing, she did see neurosurgery however does not want to undergo surgical intervention at this time.  This is also likely contributing to some of her left arm symptoms.  If these worsen could consider EMG.

## 2024-09-18 NOTE — PROGRESS NOTES
Review of Systems   Constitutional:  Negative for appetite change, fatigue and fever.   HENT: Negative.  Negative for hearing loss, tinnitus, trouble swallowing and voice change.    Eyes: Negative.  Negative for photophobia, pain and visual disturbance.   Respiratory: Negative.  Negative for shortness of breath.    Cardiovascular: Negative.  Negative for palpitations.   Gastrointestinal: Negative.  Negative for nausea and vomiting.   Endocrine: Negative.  Negative for cold intolerance.   Genitourinary: Negative.  Negative for dysuria, frequency and urgency.   Musculoskeletal:  Positive for neck pain. Negative for back pain, gait problem, myalgias and neck stiffness.   Skin: Negative.  Negative for rash.   Allergic/Immunologic: Negative.    Neurological:  Positive for numbness (left arm- when she leans forward). Negative for dizziness, tremors, seizures, syncope, facial asymmetry, speech difficulty, weakness, light-headedness and headaches.   Hematological: Negative.  Does not bruise/bleed easily.   Psychiatric/Behavioral: Negative.  Negative for confusion, hallucinations and sleep disturbance.    All other systems reviewed and are negative.

## 2024-09-18 NOTE — ASSESSMENT & PLAN NOTE
Patient seen for complaints of numbness and tingling of the tongue, lips, mouth-facial symptoms did improve with b12 supplementation, she remains on oral b12.  Does continue to note very occasional paresthesias of the roof of mouth and soft palate.  More recently these have been occurring in the setting of headaches and neck pain.  She has not had an episode in the last several months.  She did have a repeat MRI brain which did show stability of  right cerebral hemisphere cavernoma and prominence of the right cerebellar medullary angle cistern suggesting the presence of a small arachnoid cyst.  Has seen neurosurgery in the past.     We did again discuss the use of gabapentin for the symptoms however given these are very occasional she opted not to start any medication.      Will plan to repeat MRI brain At 1 year interval which will be just prior to her next appointment.  She will contact the office sooner if she notes any new or worsening symptoms.

## 2024-09-18 NOTE — PROGRESS NOTES
Neurology Ambulatory Visit  Name: Yuridia Santos       : 1965       MRN: [id number]   Encounter Provider: PATSY Liao   Encounter Date: 2024  Encounter department: NEUROLOGY [facility]    Assessment and Plan  1. Cavernous malformation  -     MRI brain IAC wo and w contrast; Future; Expected date: 2025  2. Radiculopathy, cervical region  -     cyclobenzaprine (FLEXERIL) 10 mg tablet; Take 1 tablet (10 mg total) by mouth 3 (three) times a day as needed for muscle spasms  3. Paresthesias  Assessment & Plan:  Patient seen for complaints of numbness and tingling of the tongue, lips, mouth-facial symptoms did improve with b12 supplementation, she remains on oral b12.  Does continue to note very occasional paresthesias of the roof of mouth and soft palate.  More recently these have been occurring in the setting of headaches and neck pain.  She has not had an episode in the last several months.  She did have a repeat MRI brain which did show stability of  right cerebral hemisphere cavernoma and prominence of the right cerebellar medullary angle cistern suggesting the presence of a small arachnoid cyst.  Has seen neurosurgery in the past.     We did again discuss the use of gabapentin for the symptoms however given these are very occasional she opted not to start any medication.      Will plan to repeat MRI brain At 1 year interval which will be just prior to her next appointment.  She will contact the office sooner if she notes any new or worsening symptoms.        4. Cervicogenic headache  Assessment & Plan:  Patient with symptoms of right-sided neck pain and headache that radiates to her right eye once every few months, she does have symptoms of photophobia and vertigo with this as well.  Does feel cyclobenzaprine is helpful and we will plan to continue.  Would consider gabapentin if these became a more regular occurrence.      She did have MRI cspine performed that did show  "cervical spondylosis and some mild spinal stenosis and for minimal narrowing, she did see neurosurgery however does not want to undergo surgical intervention at this time.  This is also likely contributing to some of her left arm symptoms.  If these worsen could consider EMG.      She will Return in about 7 months (around 4/18/2025). To contact the office sooner with any concerns or worsening symptoms.      History of Present Illness     HPI   Yuridia Santos is a 59 y.o. female with PMHx of bilaterally laminectomy and diskectomy 4/21 L4-L5 emergently for cauda equina.  She presents today for follow-up visit.    Last office visit 1/2024 in which she was to continue to have repeat imaging symptoms were stable.         Interval history:  She does continue to get episodic numbness and tingling of the roof of the mouth and soft palate. More so on the right side.   She will feel \"off\" during these times, a little clumsy feels her depth perception is off. May feel more balance.   She also finds with his her neck pain and right sided headaches occur with this as well recently.  She states this hasn't occurred in a few months.   She remains on b12 supplement.     She did have knee injections and PT which has helped with her balanc.e     She does have some shooting pain, numbness and tingling down the left arm, positional. No UE weakness.      Recent b12 586 1/2023           Prior workup:  MRI brain 10/2021: No acute intracranial pathology. Tiny right posterior frontal cavernous malformation.  MRI brain 10/2022: No acute intracranial pathology.     Chronic, nonemergent findings above. Similar, minimal, focal patchy periventricular and subcortical white matter foci of abnormal T2 and FLAIR hyperintensity are nonspecific, but usually secondary to changes of microangiopathy.     MRI brain 3/2024: No significant interval change. No mass effect, acute intracranial hemorrhage or evidence of recent infarction.   Stable right " cerebral hemisphere cavernoma. No regional edema.   Stable prominence of the right cerebellar medullary angle cistern suggesting the presence of a small arachnoid cyst.     MRI cervical spine 12/2022: Cervical spondylosis associated with some spinal canal and neural foraminal narrowing most notably at the C5-C6 level.        Labs: 9/2022 sjogrens, b6, folate normal. b12 229  10/2021 A1c 5.9, TSH normal  6/2022 rheumatoid factor, uric acid, sed rate, CRP, NEY, Lyme all normal, negative    Prior hx:     Per chart review, was seen 12/2021 in which she was seen for imbalance like sensation.  She reported a few months following her laminectomy she felt a sensation of the ground moving like Jell-O while walking.  Also noted is been the sensation when looking down.  Symptoms varied in severity.  Also noted that looking at computer and phone would make her symptoms worse. Previously saw ENT and was diagnosed with PPPD (persistent postural perceptual dizziness)      Of note, Sister has been having ataxic symptoms. Diagnosed at [facility], with spinocerebellar ataxia, she also has POTS, HATS. Sister had genetic testing and was negative for any spinocerebellar ataxia genes. No other family members with known neurological history. Per the above evaluation her condition was not consistent with SCA.      More recently she noted paresthesias of the tongue lips and mouth in which repeat brain MRI was ordered, she did have low B12 in which she was started on supplementation.       Review of Systems    Review of Systems   Constitutional:  Negative for appetite change, fatigue and fever.   HENT: Negative.  Negative for hearing loss, tinnitus, trouble swallowing and voice change.    Eyes: Negative.  Negative for photophobia, pain and visual disturbance.   Respiratory: Negative.  Negative for shortness of breath.    Cardiovascular: Negative.  Negative for palpitations.   Gastrointestinal: Negative.  Negative for nausea and vomiting.  "  Endocrine: Negative.  Negative for cold intolerance.   Genitourinary: Negative.  Negative for dysuria, frequency and urgency.   Musculoskeletal:  Positive for neck pain. Negative for back pain, gait problem, myalgias and neck stiffness.   Skin: Negative.  Negative for rash.   Allergic/Immunologic: Negative.    Neurological:  Positive for numbness (left arm- when she leans forward). Negative for dizziness, tremors, seizures, syncope, facial asymmetry, speech difficulty, weakness, light-headedness and headaches.   Hematological: Negative.  Does not bruise/bleed easily.   Psychiatric/Behavioral: Negative.  Negative for confusion, hallucinations and sleep disturbance.    All other systems reviewed and are negative.      I have personally reviewed the ROS performed by the MA.    Objective     /78 (BP Location: Left arm, Patient Position: Sitting, Cuff Size: Adult)   Pulse 95   Temp 97.9 °F (36.6 °C) (Temporal)   Ht 5' 4\" (1.626 m)   Wt 63 kg (138 lb 12.8 oz)   LMP  (LMP Unknown)   SpO2 99%   BMI 23.82 kg/m²    Physical Exam  Constitutional:       General: She is awake.   Eyes:      General: Lids are normal.      Extraocular Movements: Extraocular movements intact.      Pupils: Pupils are equal, round, and reactive to light.   Neurological:      Mental Status: She is alert.      Motor: Motor strength is normal.     Deep Tendon Reflexes:      Reflex Scores:       Bicep reflexes are 2+ on the right side and 2+ on the left side.       Brachioradialis reflexes are 2+ on the right side and 2+ on the left side.       Patellar reflexes are 3+ on the right side and 3+ on the left side.       Achilles reflexes are 2+ on the right side and 2+ on the left side.  Psychiatric:         Speech: Speech normal.       Neurological Exam  Mental Status  Awake and alert. Oriented to person, place, time and situation. Speech is normal. Language is fluent with no aphasia.    Cranial Nerves  CN II: Visual fields full to " confrontation.  CN III, IV, VI: Extraocular movements intact bilaterally. Normal lids and orbits bilaterally. Pupils equal round and reactive to light bilaterally.  CN V: Facial sensation is normal.  CN VII: Full and symmetric facial movement.  CN VIII: Hearing is normal.  CN IX, X: Palate elevates symmetrically. Normal gag reflex.  CN XI: Shoulder shrug strength is normal.  CN XII: Tongue midline without atrophy or fasciculations.  Endorsed diminished sensation in the posterior portion of the right palate.    Motor  Normal muscle bulk throughout. No fasciculations present. Strength is 5/5 throughout all four extremities.  Muscle tension and hypertrophy of the right sided cervical spine muscles.    Sensory  Light touch is normal in upper and lower extremities. Pinprick is normal in upper and lower extremities. Temperature is normal in upper and lower extremities. Vibration abnormality: Decreased vibratory R big toe, but otherwise normal throughout..     Reflexes                                            Right                      Left  Brachioradialis                    2+                         2+  Biceps                                 2+                         2+  Patellar                                3+                         3+  Achilles                                2+                         2+  Right Plantar: downgoing  Left Plantar: downgoing    Right pathological reflexes: Isabelle's absent. Ankle clonus absent.  Left pathological reflexes: Isabelle's absent. Ankle clonus absent.    Coordination  Right: Finger-to-nose normal. Heel-to-shin normal.Left: Finger-to-nose normal. Heel-to-shin normal.    Gait  Casual gait is normal including stance, stride, and arm swing. Able to rise from chair without using arms.        Administrative Statements

## 2024-11-06 ENCOUNTER — OFFICE VISIT (OUTPATIENT)
Dept: UROLOGY | Facility: MEDICAL CENTER | Age: 59
End: 2024-11-06
Payer: COMMERCIAL

## 2024-11-06 VITALS
WEIGHT: 139 LBS | DIASTOLIC BLOOD PRESSURE: 80 MMHG | BODY MASS INDEX: 23.73 KG/M2 | SYSTOLIC BLOOD PRESSURE: 140 MMHG | HEIGHT: 64 IN

## 2024-11-06 DIAGNOSIS — R35.0 FREQUENCY OF URINATION: ICD-10-CM

## 2024-11-06 DIAGNOSIS — N39.3 STRESS INCONTINENCE: ICD-10-CM

## 2024-11-06 DIAGNOSIS — R33.9 URINARY RETENTION: Primary | ICD-10-CM

## 2024-11-06 DIAGNOSIS — N31.9 NEUROGENIC BLADDER: ICD-10-CM

## 2024-11-06 LAB
POST-VOID RESIDUAL VOLUME, ML POC: 39 ML
SL AMB  POCT GLUCOSE, UA: NORMAL
SL AMB LEUKOCYTE ESTERASE,UA: NORMAL
SL AMB POCT BILIRUBIN,UA: NORMAL
SL AMB POCT BLOOD,UA: NORMAL
SL AMB POCT CLARITY,UA: CLEAR
SL AMB POCT COLOR,UA: YELLOW
SL AMB POCT KETONES,UA: NORMAL
SL AMB POCT NITRITE,UA: NORMAL
SL AMB POCT PH,UA: 7
SL AMB POCT SPECIFIC GRAVITY,UA: 1.01
SL AMB POCT URINE PROTEIN: NORMAL
SL AMB POCT UROBILINOGEN: 0.2

## 2024-11-06 PROCEDURE — 51798 US URINE CAPACITY MEASURE: CPT | Performed by: UROLOGY

## 2024-11-06 PROCEDURE — 99214 OFFICE O/P EST MOD 30 MIN: CPT | Performed by: UROLOGY

## 2024-11-06 PROCEDURE — 81003 URINALYSIS AUTO W/O SCOPE: CPT | Performed by: UROLOGY

## 2024-11-06 NOTE — PROGRESS NOTES
11/6/2024    Yuridia HEADLEY [place]  1965  [id number]    1. Urinary retention  -     POCT Measure PVR  -     POCT urine dip auto non-scope  2. Frequency of urination  -     Ambulatory Referral to Physical Therapy; Future  3. Neurogenic bladder  Assessment & Plan:  Concern for neurogenic bladder given urinary symptoms and history of cauda equina syndrome s/p spinal decompression with subsequent atrophy of muscles in leg  PVR 38 cc on bladder scan today  - schedule for renal sonogram to evaluate upper tracts  - schedule for urodynamics to evaluate detrusor function  - will hold off on medications for OAB symptoms at this time until UDS completed, discussed options of beta-3 agonist and anticholinergics  Orders:  -     US kidney and bladder with pvr; Future; Expected date: 11/06/2024  4. Stress incontinence  Assessment & Plan:  RACHEL   - referral to pelvic floor therapy  - of no improvements with PT can consider urethral sling, however, would want to have results of UDS before considering surgical intervention  Orders:  -     Ambulatory Referral to Physical Therapy; Future       History of Present Illness  59 y.o. female with a history of cauda equina syndrome s/p lumbar decompression surgery in 4/2021  Reports urinary symptoms started just prior to back surgery.    Last seen in 11/2022  Reports very slow flow of urine with pushing/straining void, including abdominal muscle straining  Daytime frequency q20 minutes in the morning, less frequent in the afternoon, no nocturia  Also reports stress incontinence with walking/movement, rather than urge to void  No history of recurrent UTI  Has not taken any medications for OAB in the past  No constipation  No hematuria or dysuria    Last upper tract imaging:  renal sonogram in 12/2022 showed normal kidneys, no hydronephrosis    Serum creatinine 0.86 in 1/2024  Udip today negative      AUA Symptom Score      Review of Systems   All other systems reviewed and are  negative.      Past Medical History  Past Medical History:   Diagnosis Date    Allergic 1998    Asthma     Cauda equina compression (HCC)     Dysuria 2012    Esophageal spasm 2012    Resolved:  2017    GERD (gastroesophageal reflux disease)     H/O oral surgery     Migraine     Peripheral neuropathy     Shingles 2016    Skin disorder 2013    Resolved:  2017       Past Social History  Past Surgical History:   Procedure Laterality Date    BONE GRAFT      In mouth after tooth extraction     SECTION      COLONOSCOPY      LAMINECTOMY Bilateral     L4 and L5    MOUTH SURGERY      SPINE SURGERY  20    Bilateral laminectomy and 2 disc repairs    TOOTH EXTRACTION      WISDOM TOOTH EXTRACTION         Past Family History  Family History   Problem Relation Age of Onset    Diabetes Mother     Polymyalgia rheumatica Mother     Arthritis Mother     Hypertension Mother     Alcohol abuse Father     Asthma Father     Heart attack Father     Skin cancer Sister 42    Ataxia Sister         cerebella ataxia    No Known Problems Maternal Aunt     No Known Problems Paternal Aunt     Dementia Maternal Grandmother     Psychiatric Illness Maternal Grandmother     Parkinsonism Maternal Grandfather     No Known Problems Paternal Grandmother     No Known Problems Paternal Grandfather     Depression Daughter     Anxiety disorder Daughter     Thyroid cancer Son 22    Asthma Son     Cancer Son     Completed Suicide  Cousin     Mental illness Neg Hx     Substance Abuse Neg Hx        Past Social history  Social History     Socioeconomic History    Marital status: /Civil Union     Spouse name: Not on file    Number of children: Not on file    Years of education: Not on file    Highest education level: Not on file   Occupational History    Not on file   Tobacco Use    Smoking status: Never     Passive exposure: Never    Smokeless tobacco: Never   Vaping Use    Vaping status: Never Used    Substance and Sexual Activity    Alcohol use: Yes     Alcohol/week: 2.0 standard drinks of alcohol     Types: 1 Glasses of wine, 1 Cans of beer per week     Comment: Socially    Drug use: No    Sexual activity: Yes     Partners: Male     Birth control/protection: Post-menopausal   Other Topics Concern    Not on file   Social History Narrative    Not on file     Social Determinants of Health     Financial Resource Strain: Not on file   Food Insecurity: Not on file   Transportation Needs: Not on file   Physical Activity: Not on file   Stress: Not on file   Social Connections: Not on file   Intimate Partner Violence: Not on file   Housing Stability: Not on file       Current Medications  Current Outpatient Medications   Medication Sig Dispense Refill    albuterol (PROVENTIL HFA,VENTOLIN HFA) 90 mcg/act inhaler INHALE 2 PUFFS BY MOUTH EVERY 4 HOURS AS NEEDED VIA SPACER      ALPRAZolam (XANAX) 0.25 mg tablet Take 1 tablet (0.25 mg total) by mouth daily at bedtime as needed for anxiety 30 tablet 0    budesonide-formoterol (SYMBICORT) 80-4.5 MCG/ACT inhaler Inhale 2 puffs 2 (two) times a day      Cyanocobalamin (B-12) 5000 MCG SUBL Place 5,000 mcg under the tongue daily      cyclobenzaprine (FLEXERIL) 10 mg tablet Take 1 tablet (10 mg total) by mouth 3 (three) times a day as needed for muscle spasms 30 tablet 0    hyoscyamine (ANASPAZ,LEVSIN) 0.125 MG tablet Take 1 tablet (0.125 mg total) by mouth every 4 (four) hours as needed for cramping 30 tablet 0    lidocaine (XYLOCAINE) 5 % ointment Apply 1 application. topically as needed      nitroglycerin (NITROSTAT) 0.4 mg SL tablet Place 1 tablet (0.4 mg total) under the tongue every 5 (five) minutes as needed (esophageal spasm) 25 tablet 2    omeprazole (PriLOSEC) 20 mg delayed release capsule TAKE 1 CAPSULE BY MOUTH EVERY DAY 90 capsule 1    Methylcobalamin (B-12) 5000 MCG TBDP Take 5,000 mcg by mouth in the morning (Patient not taking: Reported on 9/21/2023)       No  "current facility-administered medications for this visit.       Allergies  Allergies   Allergen Reactions    Azithromycin Swelling     Other reaction(s): swelling    Bactrim [Sulfamethoxazole-Trimethoprim] Swelling    Codeine Swelling    Sulfa Antibiotics Hives    Sulfasalazine Hives       Past Medical History, Social History, Family History, medications and allergies were reviewed.    Vitals  Vitals:    11/06/24 0944   BP: 140/80   BP Location: Right arm   Patient Position: Sitting   Cuff Size: Adult   Weight: 63 kg (139 lb)   Height: 5' 4\" (1.626 m)       Physical Exam  Vitals reviewed.   Constitutional:       Appearance: Normal appearance. She is normal weight.   HENT:      Head: Normocephalic.      Nose: Nose normal. No congestion.   Eyes:      Conjunctiva/sclera: Conjunctivae normal.   Cardiovascular:      Rate and Rhythm: Normal rate.   Pulmonary:      Effort: Pulmonary effort is normal. No respiratory distress.   Abdominal:      General: Abdomen is flat. There is no distension.      Palpations: Abdomen is soft.      Tenderness: There is no right CVA tenderness or left CVA tenderness.   Musculoskeletal:         General: No swelling. Normal range of motion.      Comments: Normal gait   Skin:     General: Skin is warm and dry.   Neurological:      General: No focal deficit present.      Mental Status: She is alert and oriented to person, place, and time.   Psychiatric:         Mood and Affect: Mood normal.         Results  No results found for: \"PSA\"  Lab Results   Component Value Date    CALCIUM 9.6 01/16/2024    K 3.9 01/16/2024    CO2 26 01/16/2024     01/16/2024    BUN 15 01/16/2024    CREATININE 0.86 01/16/2024     Lab Results   Component Value Date    WBC 4.9 01/16/2024    HGB 13.6 01/16/2024    HCT 39.1 01/16/2024    MCV 89.7 01/16/2024     01/16/2024       Office Urine Dip  No results found for this or any previous visit (from the past 1 hour(s)).  ]    "

## 2024-11-06 NOTE — ASSESSMENT & PLAN NOTE
Concern for neurogenic bladder given urinary symptoms and history of cauda equina syndrome s/p spinal decompression with subsequent atrophy of muscles in leg  PVR 38 cc on bladder scan today  - schedule for renal sonogram to evaluate upper tracts  - schedule for urodynamics to evaluate detrusor function  - will hold off on medications for OAB symptoms at this time until UDS completed, discussed options of beta-3 agonist and anticholinergics

## 2024-11-15 ENCOUNTER — HOSPITAL ENCOUNTER (OUTPATIENT)
Dept: ULTRASOUND IMAGING | Facility: MEDICAL CENTER | Age: 59
Discharge: HOME | End: 2024-11-15
Payer: COMMERCIAL

## 2024-11-15 DIAGNOSIS — N31.9 NEUROGENIC BLADDER: ICD-10-CM

## 2024-11-15 PROCEDURE — 76770 US EXAM ABDO BACK WALL COMP: CPT

## 2024-11-20 ENCOUNTER — OFFICE VISIT (OUTPATIENT)
Dept: FAMILY MEDICINE CLINIC | Facility: CLINIC | Age: 59
End: 2024-11-20
Payer: COMMERCIAL

## 2024-11-20 VITALS
TEMPERATURE: 98.6 F | BODY MASS INDEX: 23.87 KG/M2 | SYSTOLIC BLOOD PRESSURE: 142 MMHG | DIASTOLIC BLOOD PRESSURE: 84 MMHG | WEIGHT: 139.8 LBS | OXYGEN SATURATION: 96 % | HEART RATE: 127 BPM | HEIGHT: 64 IN

## 2024-11-20 DIAGNOSIS — J06.9 UPPER RESPIRATORY TRACT INFECTION, UNSPECIFIED TYPE: Primary | ICD-10-CM

## 2024-11-20 DIAGNOSIS — M54.12 RADICULOPATHY, CERVICAL REGION: ICD-10-CM

## 2024-11-20 PROCEDURE — 99213 OFFICE O/P EST LOW 20 MIN: CPT | Performed by: FAMILY MEDICINE

## 2024-11-20 RX ORDER — METHYLPREDNISOLONE 4 MG/1
TABLET ORAL
Qty: 21 EACH | Refills: 0 | Status: SHIPPED | OUTPATIENT
Start: 2024-11-20

## 2024-11-20 RX ORDER — CYCLOBENZAPRINE HCL 10 MG
10 TABLET ORAL 3 TIMES DAILY PRN
Qty: 30 TABLET | Refills: 0 | Status: SHIPPED | OUTPATIENT
Start: 2024-11-20

## 2024-11-20 RX ORDER — DOXYCYCLINE 100 MG/1
100 TABLET ORAL 2 TIMES DAILY
Qty: 20 TABLET | Refills: 0 | Status: SHIPPED | OUTPATIENT
Start: 2024-11-20 | End: 2024-11-27

## 2024-11-20 NOTE — PROGRESS NOTES
Name: Yuridia Santos      : 1965      MRN: [id number]  Encounter Provider: Giuliano Carias DO  Encounter Date: 2024   Encounter department: [facility]    Assessment & Plan  Upper respiratory tract infection, unspecified type    Orders:    methylPREDNISolone 4 MG tablet therapy pack; Use as directed on package    doxycycline (ADOXA) 100 MG tablet; Take 1 tablet (100 mg total) by mouth 2 (two) times a day for 7 days    Radiculopathy, cervical region    Orders:    cyclobenzaprine (FLEXERIL) 10 mg tablet; Take 1 tablet (10 mg total) by mouth 3 (three) times a day as needed for muscle spasms         History of Present Illness     Congestion postnasal drainage and cough.  Symptoms started several weeks ago.  Saw urgent care and was placed on 5-day course of amoxicillin.  Sinus symptoms improved but still with productive cough and slight wheeze    Cough  This is a new problem. The current episode started 1 to 4 weeks ago. The problem has been gradually worsening. The problem occurs every few minutes. The cough is Productive of sputum. Associated symptoms include ear congestion, headaches, nasal congestion, postnasal drip and wheezing. Pertinent negatives include no chest pain, chills, ear pain, fever, heartburn, hemoptysis, myalgias, rash, rhinorrhea, sore throat, shortness of breath, sweats or weight loss. The symptoms are aggravated by cold air, exercise and lying down.   Back Pain  Associated symptoms include headaches. Pertinent negatives include no chest pain, fever or weight loss.     Review of Systems   Constitutional:  Negative for chills, fever and weight loss.   HENT:  Positive for postnasal drip. Negative for ear pain, rhinorrhea and sore throat.    Respiratory:  Positive for cough and wheezing. Negative for hemoptysis and shortness of breath.    Cardiovascular:  Negative for chest pain.   Gastrointestinal:  Negative for heartburn.   Musculoskeletal:  Positive for back  pain. Negative for myalgias.   Skin:  Negative for rash.   Neurological:  Positive for headaches.     Past Medical History:   Diagnosis Date    Allergic 1998    Asthma     Cauda equina compression (HCC)     Dysuria 2012    Esophageal spasm 2012    Resolved:  2017    GERD (gastroesophageal reflux disease)     H/O oral surgery     Migraine     Peripheral neuropathy     Shingles 2016    Skin disorder 2013    Resolved:  2017     Past Surgical History:   Procedure Laterality Date    BONE GRAFT      In mouth after tooth extraction     SECTION      COLONOSCOPY      LAMINECTOMY Bilateral     L4 and L5    MOUTH SURGERY      SPINE SURGERY  20    Bilateral laminectomy and 2 disc repairs    TOOTH EXTRACTION      WISDOM TOOTH EXTRACTION       Family History   Problem Relation Age of Onset    Diabetes Mother     Polymyalgia rheumatica Mother     Arthritis Mother     Hypertension Mother     Alcohol abuse Father     Asthma Father     Heart attack Father     Skin cancer Sister 42    Ataxia Sister         cerebella ataxia    No Known Problems Maternal Aunt     No Known Problems Paternal Aunt     Dementia Maternal Grandmother     Psychiatric Illness Maternal Grandmother     Parkinsonism Maternal Grandfather     No Known Problems Paternal Grandmother     No Known Problems Paternal Grandfather     Depression Daughter     Anxiety disorder Daughter     Thyroid cancer Son 22    Asthma Son     Cancer Son     Completed Suicide  Cousin     Mental illness Neg Hx     Substance Abuse Neg Hx      Social History     Tobacco Use    Smoking status: Never     Passive exposure: Never    Smokeless tobacco: Never   Vaping Use    Vaping status: Never Used   Substance and Sexual Activity    Alcohol use: Yes     Alcohol/week: 2.0 standard drinks of alcohol     Types: 1 Glasses of wine, 1 Cans of beer per week     Comment: Socially    Drug use: No    Sexual activity: Yes     Partners: Male     Birth  control/protection: Post-menopausal     Current Outpatient Medications on File Prior to Visit   Medication Sig    albuterol (PROVENTIL HFA,VENTOLIN HFA) 90 mcg/act inhaler INHALE 2 PUFFS BY MOUTH EVERY 4 HOURS AS NEEDED VIA SPACER    ALPRAZolam (XANAX) 0.25 mg tablet Take 1 tablet (0.25 mg total) by mouth daily at bedtime as needed for anxiety    budesonide-formoterol (SYMBICORT) 80-4.5 MCG/ACT inhaler Inhale 2 puffs 2 (two) times a day    Cyanocobalamin (B-12) 5000 MCG SUBL Place 5,000 mcg under the tongue daily    hyoscyamine (ANASPAZ,LEVSIN) 0.125 MG tablet Take 1 tablet (0.125 mg total) by mouth every 4 (four) hours as needed for cramping    lidocaine (XYLOCAINE) 5 % ointment Apply 1 application. topically as needed    Methylcobalamin (B-12) 5000 MCG TBDP Take 5,000 mcg by mouth in the morning (Patient not taking: Reported on 9/21/2023)    nitroglycerin (NITROSTAT) 0.4 mg SL tablet Place 1 tablet (0.4 mg total) under the tongue every 5 (five) minutes as needed (esophageal spasm)    omeprazole (PriLOSEC) 20 mg delayed release capsule TAKE 1 CAPSULE BY MOUTH EVERY DAY    [DISCONTINUED] cyclobenzaprine (FLEXERIL) 10 mg tablet Take 1 tablet (10 mg total) by mouth 3 (three) times a day as needed for muscle spasms     Allergies   Allergen Reactions    Azithromycin Swelling     Other reaction(s): swelling    Bactrim [Sulfamethoxazole-Trimethoprim] Swelling    Codeine Swelling    Sulfa Antibiotics Hives    Sulfasalazine Hives     Immunization History   Administered Date(s) Administered    COVID-19 PFIZER VACCINE 0.3 ML IM 03/26/2021, 04/16/2021, 12/10/2021    INFLUENZA 09/21/2017, 09/18/2018, 10/04/2019, 10/13/2020, 10/24/2022    Influenza, injectable, quadrivalent, preservative free 0.5 mL 09/21/2023    Influenza, recombinant, quadrivalent,injectable, preservative free 10/05/2021, 10/24/2022    Influenza, seasonal, injectable 1965    Tdap 06/02/2012, 05/22/2023    Zoster Vaccine Recombinant 01/10/2024, 05/06/2024  "    Objective   /84 (BP Location: Left arm, Patient Position: Standing, Cuff Size: Standard)   Pulse (!) 127   Temp 98.6 °F (37 °C) (Temporal)   Ht 5' 4\" (1.626 m)   Wt 63.4 kg (139 lb 12.8 oz)   LMP  (LMP Unknown)   SpO2 96%   BMI 24.00 kg/m²     Physical Exam  Vitals and nursing note reviewed.   Constitutional:       General: She is not in acute distress.     Appearance: Normal appearance.   HENT:      Head: Normocephalic and atraumatic.      Nose: Congestion and rhinorrhea present.   Eyes:      Pupils: Pupils are equal, round, and reactive to light.   Cardiovascular:      Rate and Rhythm: Normal rate and regular rhythm.      Pulses: Normal pulses.      Heart sounds: Normal heart sounds.   Pulmonary:      Effort: Pulmonary effort is normal.      Breath sounds: Wheezing present.   Musculoskeletal:         General: Normal range of motion.      Cervical back: Normal range of motion.   Skin:     General: Skin is warm and dry.   Neurological:      General: No focal deficit present.      Mental Status: She is alert and oriented to person, place, and time. Mental status is at baseline.   Psychiatric:         Mood and Affect: Mood normal.         Behavior: Behavior normal.         Thought Content: Thought content normal.         Judgment: Judgment normal.         "

## 2024-11-22 ENCOUNTER — RESULTS FOLLOW-UP (OUTPATIENT)
Dept: UROLOGY | Facility: MEDICAL CENTER | Age: 59
End: 2024-11-22

## 2024-12-04 ENCOUNTER — EVALUATION (OUTPATIENT)
Dept: PHYSICAL THERAPY | Facility: REHABILITATION | Age: 59
End: 2024-12-04
Payer: COMMERCIAL

## 2024-12-04 DIAGNOSIS — R35.0 FREQUENCY OF URINATION: Primary | ICD-10-CM

## 2024-12-04 DIAGNOSIS — N39.3 STRESS INCONTINENCE: ICD-10-CM

## 2024-12-04 PROCEDURE — 97161 PT EVAL LOW COMPLEX 20 MIN: CPT | Performed by: PHYSICAL THERAPIST

## 2024-12-04 PROCEDURE — 97530 THERAPEUTIC ACTIVITIES: CPT | Performed by: PHYSICAL THERAPIST

## 2024-12-04 NOTE — PROGRESS NOTES
PT Evaluation     Today's date: 2024  Patient name: Yuridia Santos  : 1965  MRN: [id number]  Referring provider: Giuliano Hoffman MD  Dx:   Encounter Diagnosis     ICD-10-CM    1. Frequency of urination  R35.0 Ambulatory Referral to Physical Therapy      2. Stress incontinence  N39.3 Ambulatory Referral to Physical Therapy          Start Time: 935  Stop Time: 1030  Total time in clinic (min): 55 minutes    Assessment  Impairments: abnormal or restricted ROM, impaired balance, impaired physical strength, lacks appropriate home exercise program and poor posture     Assessment details: Patient is a 59 y.o. female presenting to initial examination with chief complaint of RACHEL and urinary frequency. Internal assessment will be deferred until next 1-2 visits secondary to time constraints. Functional impairments include RACHEL, urinary urgency and frequency, urinary straining and low back pain. Physical findings include proximal hip weakness, hip IR tightness, and poor coordination among breathing, core, and pelvic floor muscles. Patient would benefit from formal physical therapy to address impairments as detailed, decrease pain, and restore maximal level of function for all home, recreational and mobility tasks. Educated patient regarding plan of care and answered all patient questions to patient satisfaction. Thank you for this referral.    Understanding of Dx/Px/POC: good     Prognosis: good    Goals  Short term goals:  Pt will report instance of urinary incontinence 2 times a week or less   Pt will perform Kegel consistently prior to cough/sneeze in 6 weeks.  Pt will be able to delay urge at least 5 minutes in 6 weeks.  Pt will demonstrate good understanding of urge delay techniques in 6 weeks  Pt will verbalize understanding of bowel posture and mechanics     Long term goals:  Pt will reduce pad usage to social outings or less by discharge.  Pt will be compliant with HEP by discharge.  Pt will report <3  instances of leakage per month.  Pt will present with MMT 4+/5 bl LE all planes      Plan  Patient would benefit from: skilled physical therapy  Planned modality interventions: biofeedback, cryotherapy and thermotherapy: hydrocollator packs    Planned therapy interventions: joint mobilization, manual therapy, neuromuscular re-education, patient/caregiver education, strengthening, stretching, therapeutic activities, therapeutic exercise, abdominal trunk stabilization, balance, flexibility and home exercise program    Frequency: 1-2x week  Duration in weeks: 12  Treatment plan discussed with: patient  Plan details: Cont per POC. Review bladder diary. Initiate urge deferral. Perform internal vaginal assessment       PT Pelvic Floor Subjective:   History of Present Illness:   Pt is coming to PT for RACHEL and urinary frequency. RACHEL and bladder symptoms has been present x1 year. She has had bladder issues previously, improved after surgery for cauda equina surgery. Reports she was having urinary frequency, urgency and feeling of incomplete emptying. She had cauda equina syndrome which led to surgery in 2021 and her bladder symptoms were present prior to that with saddle anesthesia 6 weeks prior to the surgery. After the surgery for her back, her bladder symptoms improved for a while but returned about 1 year ago.     1 year ago she had a knee injury, she had bone marrow swelling and she was not as mobile as usual  Aug 2023-Jan 2024 and was only really walking in the pool. Then she returned to long distance walking in January and  her symptoms began at that point.      Pt feels she has to go to the bathroom even though she does not have to. She tries not to, if she is busy she can overlook it. Feels incomplete emptying, has to strain to get all her pee out. She has urinary leakage when she walks 4 miles. She will go right before she walks out the door and by then of the block she will already have leaked. Then she will have  a let down about half way through her walk and it will soak her pants.She will get sudden urge and the leakage comes right after. She will tighten her muscles but it will make it work.   PVR was normal      Pt hurt her back 2 weeks ago, has had back pain and R LE radicular symptoms with R toes 2-4 numbness.   Pt saw PCP for this and prescribed prednisone for bronchitis but was told it would help her back. Her pain is better and the symptoms are intermittent.  She also had muscle relaxers which provide some relief. Her numbness in her toes are about the same. PT rec follow up with PCP for this.   Prior to her cauda equina syndrome she had constant back pain for years and had R foot numbness.   Social Support:     Lives in:  Multiple-level home    Lives with:  Spouse    Relationship status: /committed    Work status: retired    History of Depression: no  Diet and Exercise:    Diet:balanced nutrition    Exercise frequency: 3-4 times per week    4 mile walks   OB/ gyn History    Gestational History:     Prior Pregnancy: Yes      Number of prior pregnancies: 4    Number of term pregnancies: 3    Delivery Type: vaginal delivery and  section      Number of vaginal deliveries: 2    Number of caesarian sections: 1    Menstrual History:      Menopausal: menopause  Bladder Function:     Voiding Difficulties positive for: urgency, frequent urination, hesitancy, straining and incomplete emptying       Voiding Difficulties comments:     Voiding frequency: every 1-2 hours    Urinary leakage: urine leakage (only when walking for exercise)    Urinary leakage aggravated by: coughing (during bronchitis), exercise, walking to the bathroom, key-in-the-door syndrome and seeing a toilet    Urinary leakage not aggravated by: sneezing, bending, standing up, hearing running water and post-void dribble    Nocturia (episodes per night): 0    Painful urination: No      Intake (ounces): Water: 96, Coffee: 12, Tea: 16, Daily:  "4Weekly: 4  Incontinence Management:     Pads/Diapers Additional Comments: only when exercising, wlaking, hiking, or travelling  Bowel Function:     Bowel Function comments:  Since 2021 with her cauda equina surgery, she has not had a solid BM   She hurt her back 2 weeks ago and her BM's are now solid and she may be more constipated.   She was on prednisone for a week and muscle relaxer's   Normally type 6 or 7, since back pain incident, type 1   Pt will discuss with PCP her change in bowels     Bowel frequency: daily    Stool softener use: no stool softeners    Enema use: no enema    Uses \"squatty potty\": no Squatty Potty  Sexual Function:     Sexually Active:  Sexually active    Pain during intercourse: Yes      Lubrication Use: Yes    pain causes abstinence    Patient wishes to return to having intercourse: currently unable to have intercourse but wants toPain with intercourse x5 years   Discussed with GYN, may be some dryness, was given lidocaine which helped  Will feel like a knife pain  She also has numbness since cauda equina    Pain: 7/10 during intercourse, will linger for an hour afterwards :  Diagnostic Tests:     Post void residual: normal   Patient Goals:     Patient goals for therapy:  Improved bladder or bowel function    Other patient goals:  To not leak while walking and not have to wear a pad      Objective   Graphical documentation:     Pain with intercourse x5 years   Discussed with GYN, may be some dryness, was given lidocaine which helped  Will feel like a knife pain  She also has numbness since cauda equina    Pain: 7/10 during intercourse, will linger for an hour afterwards       Pt provided verbal consent prior to and throughout objective assessment     Posture: mild swayback    Tenderness:  Piriformis: neg  Obturator Internus: neg  Post pelvic floor: neg  Adductors: neg, tightness noted     Lumbar AROM   WNL except max limitation lumbar flex (limited since back surgery) and mod limitation " in lumbar rotation    (-) slump test   (-) SLR b/l     SLS  L: good load transfer, lumbar ext/swayback  R: good load transfer, lumbar ext/swayback    Other Comments:  TAC: good   PFM: good  360 deg breathing: poor lateral rib expansion, inc chest elevation  Squat: inc ant translation of knees over toes     MMT:   Left Right   Hip flex 4 4-   Hip ABD 4 4+   Hip ext 4 4   Hip IR 4+ 4+   Hip ER 4+ 4+   Knee flex 4+ 4+   Knee ext 4+ 4+   Ankle DF 4+ 4+               Special Tests:   Left Right   EKTA + -   FADIR - -   Post Thigh Thrust - -   Hip IR PROM 11 deg 18 deg    Hip ER PROM WNL WNL   Pop angle  WNL WNL   ASLR neg neg          Access Code: [email or web address]  URL: [email or web address]  Date: 12/04/2024  Prepared by: Zahida Kwon    Exercises  - Supine Hip Internal and External Rotation  - 2 x daily - 10 reps - 5-10 secs hold    Precautions:   Patient Active Problem List   Diagnosis    Anxiety    Asthma    Mixed hyperlipidemia    Vitamin D deficiency    Lumbar back pain with radiculopathy affecting right lower extremity    Peroneal tendinitis    Sprain of MCL (medial collateral ligament) of knee    Tremor    Localized, primary osteoarthritis of hand    Paresthesias    Feeling of incomplete bladder emptying    Cervicogenic headache    Primary osteoarthritis of left knee    Neurogenic bladder    Stress incontinence    Frequency of urination       Impairments/functional limitations: low back pain, hx of cauda equina, RACHEL, urgency and incontinence, urinary frequency, constipation, OI/post pelvic floor tightness, hip IR tightness   Daily Treatment Diary    Manuals 12/4         External vaginal assess Next 1-2 visits         Internal vaginal assess Next 1-2 visits          Internal manual therapy                    Neuro Re-Ed          [facility]                                        TAC          PF          PF+TAC          PF+TAC with march          PF+TAC with alt knee fall out          PF+TAC with ball squeeze   "        PF+TAC with SLR          PF+TAC with s/l ABD          PF+TAC with bridge                                                  360 deg breathing          Ther Ex          Self internal STM                    Supine happy baby with 360 deg breathing                              Seated piri stretch          Supine butterfly stretch nv         Supine hip IR/ER AROM  5\"x5 ea                                       Ther Activity          Bowel mechanics           Voiding mechanics           Healthy bladder habits           Bladder Diary  Review, issued         Urge delay:QF          Urge delay: HR          Freeze, breathe, squeeze                               Gait Training                              Modalities                                        "

## 2024-12-06 NOTE — PROGRESS NOTES
PT Re-Evaluation     Today's date: 3/22/2018  Patient name: Torin Parnell  : 1965  MRN: [id number]  Referring provider: Skylar Epstein DO  Dx:   Encounter Diagnosis     ICD-10-CM    1  Iliotibial band syndrome of right side M76 31    2  SI (sacroiliac) joint dysfunction M53 3                   Assessment  Impairments: impaired physical strength and pain with function  Functional limitations: pain with walking greater than 20 minutes on uneven surfaces  Assessment details: Torin Parnell has been compliant with attending PT and HEP since initial eval  Torin Parnell has made improvements in objective data since IE and ITB pain has resolved  She presents now with acute L SIJ dysfunction pain and would benefit from additional skilled PT to address these deficits to return to PLOF  Understanding of Dx/Px/POC: good   Prognosis: good    Goals  STG  1) R hip ROM will improve in 4 weeks  -Met  2) B/L knee strength will improve 1/2 grade in 4 weeks  -Met  3) B/L hip strength will improve 1/2 grade in 4 weeks  -Met  LTG  1) Patient is independent in HEP  -Partially met  2) Patient will ascend/descend one flight of stairs independently with no increased pain in 8 weeks   -Met  3) Ambulation will be improved to PLOF in 8 weeks  -Partially met  4) Recreational performance will be improved to PLOF in 8 weeks  -Partially met    Plan  Patient would benefit from: skilled PT  Planned modality interventions: cryotherapy and thermotherapy: hydrocollator packs  Planned therapy interventions: home exercise program, therapeutic exercise, therapeutic activities, stretching, strengthening, patient education, neuromuscular re-education, manual therapy and joint mobilization  Frequency: 1x week  Duration in weeks: 4  Treatment plan discussed with: patient        Subjective Evaluation    History of Present Illness  Mechanism of injury: Patient presents 4 weeks after IE, reporting resolution of R ITB pain and has gotten back to her daily morning walks without increase in R hip/thigh pain  She does now admit that 2 weeks ago she jammed her LLE when she was walking down steps in the dark, and woke up the next morning with severe pain over L PSIS which lasted until 2 days ago  She had consult with orthopedics, where she was evaluated and given stretches, and reports that 2 days ago when she was completing her stretches, she felt a pop and immediate resolution of the PSIS pain  She would like to know some stretches and core strength to prevent recurrence of injury  Quality of life: good    Pain  Current pain ratin  At best pain ratin  At worst pain rating: 3  Quality: discomfort and sharp  Relieving factors: change in position and rest  Aggravating factors: walking and running  Progression: improved      Diagnostic Tests  X-ray: normal (osteophytes on R iliac crest)  Patient Goals  Patient goals for therapy: decreased pain, return to sport/leisure activities and increased strength          Objective     Palpation     Right   No palpable tenderness to the gluteus luis angel, gluteus medius, piriformis and TFL  Tenderness     Left Hip   No tenderness in the iliac crest      Right Hip   No tenderness in the iliac crest      Lumbar Screen  Lumbar range of motion within normal limits  Neurological Testing     Additional Neurological Details  Sensation intact and reflexes normal throughout  Active Range of Motion   Left Hip   Normal active range of motion    Right Hip   Normal active range of motion    Strength/Myotome Testing     Right Hip   Planes of Motion   Flexion: 4+  Extension: 4+  Abduction: 4+    Isolated Muscles   Gluteus maximums: 4+    Additional Strength Details  Knee strength 4+/5 B/L    Tests     Left Hip   Negative EKTA, piriformis, SI compression and SI distraction  Right Hip   Negative EKTA, FADIR, modified Sam, Sam and piriformis  SLR: Negative       General Comments     Hip Comments   FOTO score for R ITB improved from 60 to 78 in 2 visits  FOTO score for L SIJ: 57       Flowsheet Rows    Flowsheet Row Most Recent Value   PT/OT G-Codes   Current Score  78   Projected Score  73   FOTO information reviewed  Yes   Assessment Type  Re-evaluation   G code set  Mobility: Walking & Moving Around   Mobility: Walking and Moving Around Current Status ()  CJ   Mobility: Walking and Moving Around Goal Status ()  CJ        Precautions: none     Daily Treatment Diary      Manual                                                                                                                                                     Exercise Diary   3/22                     Hook lying hip abduction 5" 2x10                     Hook lying hip adduction 5" 2x10                     Glute bridge 2x10                     PPT with march 10 ea                     PPT with leg ext NV                     Clam shells 2x10                     Standing ITB stretch R 30"x3                      Iso hip hike 15"x3 each                     Pball at back squats 2x10                     Prone quad sets 5" x10                     Side steps with band around thighs No band, 25' x4                     MET for L SIJ dysfunction 5" x10 each                                                                                                                                                                                                   Modalities                        CP/MHP prn                                                                         ADDENDUM: Patient called to cancel last appointment, is feeling well enough to d/c from therapy and continue with HEP   -AN 4/4/2018 negative

## 2024-12-11 ENCOUNTER — OFFICE VISIT (OUTPATIENT)
Dept: PHYSICAL THERAPY | Facility: REHABILITATION | Age: 59
End: 2024-12-11
Payer: COMMERCIAL

## 2024-12-11 DIAGNOSIS — N39.3 STRESS INCONTINENCE: ICD-10-CM

## 2024-12-11 DIAGNOSIS — R35.0 FREQUENCY OF URINATION: Primary | ICD-10-CM

## 2024-12-11 PROCEDURE — 97112 NEUROMUSCULAR REEDUCATION: CPT | Performed by: PHYSICAL THERAPIST

## 2024-12-11 PROCEDURE — 97530 THERAPEUTIC ACTIVITIES: CPT | Performed by: PHYSICAL THERAPIST

## 2024-12-11 NOTE — PROGRESS NOTES
Daily Note     Today's date: 2024  Patient name: Yuridia Santos  : 1965  MRN: [id number]  Referring provider: Giuliano Hoffman MD  Dx:   Encounter Diagnosis     ICD-10-CM    1. Frequency of urination  R35.0       2. Stress incontinence  N39.3           Start Time: 926  Stop Time: 1029  Total time in clinic (min): 63 minutes    Subjective: Pt reports she completed her bladder diary and realized she drinks a lot of fluid in her day, about 114 oz per day. She was trying to hold her pee longer while tracking sometimes unless she was leaving the house.        Objective: See treatment diary below      Assessment: Pt presents with total number of voids in a day ranging from 8-12 times a day with nightime voids of 0 times a night. Pt has intervoid interval ranging from 5 mins to 4 hours. Pt experienced strong urinary urge 25% of the time or less related to key in the door or leaving her house. Pt had 1 other instance of strong urge when she heard the news that her daughter-in-law went into labor. Pt had incontinence 9 times in the past week related to being out for a walk. 2 instances of incontinence occurred when being at a store and walking to car as well as when getting home from the store. PT cued pt on maintaining 2-4 hour intervoid interval during the day and utilize urge deferral strategy with FBS to assure pt voids at a low urge. Pt to read strategy daily for repeated exposure to improve improve pt's ability to successfully use strategy as needed. Pt demo paradoxical breathing pattern in sitting with inc chest elevation, improved with cueing for lateral rib expansion.   Tolerated treatment well. Patient would benefit from continued PT      Plan: Continue per plan of care.        Impairments/functional limitations: low back pain, hx of cauda equina, RACHEL, urgency and incontinence, urinary frequency, constipation, OI/post pelvic floor tightness, hip IR tightness   Daily Treatment Diary    Manuals   "12/11        External vaginal assess Next 1-2 visits nv        Internal vaginal assess Next 1-2 visits  nv        Internal manual therapy                    Neuro Re-Ed          [facility]                                        TAC          PF          PF+TAC          PF+TAC with march          PF+TAC with alt knee fall out          PF+TAC with ball squeeze          PF+TAC with SLR          PF+TAC with s/l ABD          PF+TAC with bridge                                                  360 deg breathing  Review, sitting, lateral rib expansion        Ther Ex          Self internal STM                    Supine happy baby with 360 deg breathing                              Seated piri stretch          Supine butterfly stretch nv         Supine hip IR/ER AROM  5\"x5 ea                                       Ther Activity          Bowel mechanics           Voiding mechanics           Healthy bladder habits           Bladder Diary  Review, issued review        Urge delay:QF  Review, issued        Urge delay: HR  Review, issued        Freeze, breathe, squeeze   Review, issued                            Gait Training                              Modalities                                        "

## 2024-12-18 ENCOUNTER — OFFICE VISIT (OUTPATIENT)
Dept: PHYSICAL THERAPY | Facility: REHABILITATION | Age: 59
End: 2024-12-18
Payer: COMMERCIAL

## 2024-12-18 DIAGNOSIS — R35.0 FREQUENCY OF URINATION: Primary | ICD-10-CM

## 2024-12-18 DIAGNOSIS — N39.3 STRESS INCONTINENCE: ICD-10-CM

## 2024-12-18 PROCEDURE — 97140 MANUAL THERAPY 1/> REGIONS: CPT | Performed by: PHYSICAL THERAPIST

## 2024-12-18 PROCEDURE — 97530 THERAPEUTIC ACTIVITIES: CPT | Performed by: PHYSICAL THERAPIST

## 2024-12-18 PROCEDURE — 97110 THERAPEUTIC EXERCISES: CPT | Performed by: PHYSICAL THERAPIST

## 2024-12-18 NOTE — PROGRESS NOTES
Daily Note     Today's date: 2024  Patient name: Yuridia Santos  : 1965  MRN: [id number]  Referring provider: Giuliano Hoffman MD  Dx:   Encounter Diagnosis     ICD-10-CM    1. Frequency of urination  R35.0       2. Stress incontinence  N39.3           Start Time: 828          Subjective: Pt reports her urge deferral strategy is working, she can breathe and work her way through it. However, when she was walking she had more urinary leakage without an urge. Pt is not voiding JIC before leaving the house except for when going for a walk. Pt will void before bed and when she lies on her R side, she gets a UTI feeling like she needs to void, it does not occur on her L side on her back. Only happens occasionally. Pt only had urinary leakage during her walks.       Objective: See treatment diary below    Pt provided consent prior to and throughout initiation of external/internal assessment  Pt declined second person in the room      Position: supine exam      General Perineum Exam:   Perineum intact: Y  Pelvic organ prolapse at rest: N:      Visual Inspection of Perineum:   Excursion of perineal body in cephalad direction with contraction of pelvic floor muscles (PFM): good  Excursion of perineal body in caudal direction with relaxation of pelvic floor muscles (PFM): good  Involuntary relaxation with bearing down: good    Involuntary contraction with coughing: N  Anal [place]: Y  Cotton swab test: non-tender  Sensation: intact     Pelvic Organ Prolapse   Position: hook-lying  At rest: none  With bearing down: none     Pelvic Floor Muscle Exam        PERFECT Score   Power right:2  Power left: 2  Endurance (seconds to max): 3  Repetitions (before fatigue):   Fast flicks (in 10 seconds):      Tenderness to palpation:  Layer 1: bulbospongiosus, ischiocavernosus R>L  Layer 2: periurethral muscles R>L  Layer 3: coccygeus L    PT performed internal manual therapy with positive pt response with decreased palpable  "muscle tension.      Assessment: PT performed internal vaginal assessment with noted muscle tension on R>L at Layer 1. Pt responded positively to manual therapy and was issued self internal manual therapy to layer 1 muscles and pt verbalized understanding of this. PT also initiated external stretches with focus on PFM relaxation for HEP. PT cued pt on the knack to pre-contract PFM prior to a cough or sneeze however t can also trial it prior to lifting grandkids as well being sure to relax PFM after the activity. Tolerated treatment well. Patient would benefit from continued PT      Plan: Continue per plan of care.      Impairments/functional limitations: low back pain, hx of cauda equina, RACHEL, urgency and incontinence, urinary frequency, constipation, OI/post pelvic floor tightness, hip IR tightness   Daily Treatment Diary    Manuals 12/4 12/11 12/18       External vaginal assess Next 1-2 visits nv performed       Internal vaginal assess Next 1-2 visits  nv performed       Internal manual therapy   bulbospongiosus, ischiocavernosus R>L,   periurethral muscles R>L, coccygeus L                 Neuro Re-Ed          [facility]   Review                                      TAC          PF   Review        PF+TAC          PF+TAC with march          PF+TAC with alt knee fall out          PF+TAC with ball squeeze          PF+TAC with SLR          PF+TAC with s/l ABD          PF+TAC with bridge                                                  360 deg breathing  Review, sitting, lateral rib expansion Review, in bed, R sidelying        Ther Ex          Self internal STM   Layer 1, issued                  Supine happy baby with 360 deg breathing   1 mins        Seated piri stretch   30\"x2 ea       Supine butterfly stretch nv         Supine hip IR/ER AROM  5\"x5 ea  Hold on L 5\"x5 R                                     Ther Activity          Bowel mechanics           Voiding mechanics           Healthy bladder habits    Review, " voiding JIC        Bladder Diary  Review, issued review        Urge delay:QF  Review, issued        Urge delay: HR  Review, issued        Freeze, breathe, squeeze   Review, issued review       Managing IAP/exhale on exertion (lifting, bending, transfers)                    Gait Training                              Modalities

## 2024-12-31 ENCOUNTER — APPOINTMENT (OUTPATIENT)
Dept: PHYSICAL THERAPY | Facility: REHABILITATION | Age: 59
End: 2024-12-31
Payer: COMMERCIAL

## 2025-01-08 ENCOUNTER — OFFICE VISIT (OUTPATIENT)
Dept: PHYSICAL THERAPY | Facility: REHABILITATION | Age: 60
End: 2025-01-08
Payer: COMMERCIAL

## 2025-01-08 ENCOUNTER — PROCEDURE VISIT (OUTPATIENT)
Dept: UROLOGY | Facility: MEDICAL CENTER | Age: 60
End: 2025-01-08
Payer: COMMERCIAL

## 2025-01-08 VITALS
DIASTOLIC BLOOD PRESSURE: 80 MMHG | OXYGEN SATURATION: 97 % | HEIGHT: 64 IN | BODY MASS INDEX: 23.73 KG/M2 | HEART RATE: 92 BPM | WEIGHT: 139 LBS | SYSTOLIC BLOOD PRESSURE: 130 MMHG

## 2025-01-08 DIAGNOSIS — R35.0 FREQUENCY OF URINATION: ICD-10-CM

## 2025-01-08 DIAGNOSIS — N36.44 DETRUSOR SPHINCTER DYSSYNERGIA: Primary | ICD-10-CM

## 2025-01-08 DIAGNOSIS — N39.3 STRESS INCONTINENCE OF URINE: ICD-10-CM

## 2025-01-08 DIAGNOSIS — N39.3 STRESS INCONTINENCE: ICD-10-CM

## 2025-01-08 DIAGNOSIS — R35.0 FREQUENCY OF URINATION: Primary | ICD-10-CM

## 2025-01-08 LAB
SL AMB  POCT GLUCOSE, UA: NORMAL
SL AMB LEUKOCYTE ESTERASE,UA: NORMAL
SL AMB POCT BILIRUBIN,UA: NORMAL
SL AMB POCT BLOOD,UA: NORMAL
SL AMB POCT CLARITY,UA: CLEAR
SL AMB POCT COLOR,UA: YELLOW
SL AMB POCT KETONES,UA: NORMAL
SL AMB POCT NITRITE,UA: NORMAL
SL AMB POCT PH,UA: 6.5
SL AMB POCT SPECIFIC GRAVITY,UA: 1
SL AMB POCT URINE PROTEIN: NORMAL
SL AMB POCT UROBILINOGEN: 0.2

## 2025-01-08 PROCEDURE — 51797 INTRAABDOMINAL PRESSURE TEST: CPT

## 2025-01-08 PROCEDURE — 51728 CYSTOMETROGRAM W/VP: CPT

## 2025-01-08 PROCEDURE — 97112 NEUROMUSCULAR REEDUCATION: CPT | Performed by: PHYSICAL THERAPIST

## 2025-01-08 PROCEDURE — 51784 ANAL/URINARY MUSCLE STUDY: CPT

## 2025-01-08 PROCEDURE — 81003 URINALYSIS AUTO W/O SCOPE: CPT

## 2025-01-08 PROCEDURE — 97110 THERAPEUTIC EXERCISES: CPT | Performed by: PHYSICAL THERAPIST

## 2025-01-08 RX ORDER — DIPHENHYDRAMINE HCL 25 MG
CAPSULE ORAL
COMMUNITY

## 2025-01-08 RX ORDER — IBUPROFEN 200 MG
TABLET ORAL
COMMUNITY

## 2025-01-08 NOTE — PROGRESS NOTES
"Daily Note     Today's date: 2025  Patient name: Yuridia Santos  : 1965  MRN: [id number]  Referring provider: Giuliano Hoffman MD  Dx:   Encounter Diagnosis     ICD-10-CM    1. Frequency of urination  R35.0       2. Stress incontinence  N39.3           Start Time: 1138  Stop Time: 1233  Total time in clinic (min): 55 minutes    Subjective: Pt is feeling a lot of urge because she had urodynamic testing with a catheter. She was not leaking during the testing. In the past few weeks her leakage has been better. She is doing long walks without a pad. She will leak  anytime she is out walking but the amount has decreased, she is not having \"let downs.\"  She did decrease fluid intake. During a 2-3 mile walk she will leak a few drops 3 times. She leaks always when she first starts and is close to home and then the rest is sporadic. When she gets home she is not needing to void. Pt has been using the knack before a cough or sneeze and has been successful with not leaking at least once when doing that. Pt is more aware when she  her abdominals, she will leak more. She is trying to be more aware of posture now rather than constant abdominal traction. Pt has not done her self internal stretches yet due to having company in her house. She tried it once 2 days ago and found it very hard to get in position and relax her legs. She was propped on pillows in her bed, she did not have pillows under legs.       Objective: See treatment diary below      Assessment: PT cued pt on optimal position for self internal stretch and option of using pelvic wand if arm/torso length is prohibitive. Pt verbalized understanding of this. Patient performed recumbent bike aerobic exercise to increase blood flow to the area being treated, prepare the muscles for strength training and stretching, improve overall tolerance to activity, and aerobic endurance. PT cued pt on maintaining neutral hip/knee alignment during forward revolution " "on bike on L>R as pt had tendency to let LLE fall into ER. PT initiated core stabilization with PFM/TAC and breathing coordination during LE strengthening exercises this session. Pt demo good technique but challenged with relaxation of TAC after each rep.   Tolerated treatment well. Patient would benefit from continued PT      Plan: Continue per plan of care.      Impairments/functional limitations: low back pain, hx of cauda equina, RACHEL, urgency and incontinence, urinary frequency, constipation, OI/post pelvic floor tightness, hip IR tightness   Daily Treatment Diary    Manuals 12/4 12/11 12/18 1/8      External vaginal assess Next 1-2 visits nv performed       Internal vaginal assess Next 1-2 visits  nv performed       Internal manual therapy   bulbospongiosus, ischiocavernosus R>L,   periurethral muscles R>L, coccygeus L       Hip IR PROM, post hip mob          Neuro Re-Ed          The knack   Review  Review, continue                                     TAC    review      PF   Review  review      PF+TAC    Review, 10\"x10       PF+TAC with march    X10 ea      PF+TAC with alt knee fall out    X10 ea      PF+TAC with ball squeeze    5\"x10      PF+TAC with SLR          PF+TAC with s/l ABD          PF+TAC with bridge    3\"x10                                              360 deg breathing  Review, sitting, lateral rib expansion Review, in bed, R sidelying        Ther Ex          Self internal STM   Layer 1, issued  Review, position, possible pelvic wand      Bike    10 mins L1       Supine happy baby with 360 deg breathing   1 mins  HEP, review      Seated piri stretch   30\"x2 ea HEP review      Supine butterfly stretch nv         Supine hip IR/ER AROM  5\"x5 ea  Hold on L 5\"x5 R HEP review                                     Ther Activity          Bowel mechanics           Voiding mechanics           Healthy bladder habits    Review, voiding JIC        Bladder Diary  Review, issued review        Urge delay:QF  Review, " issued        Urge delay: HR  Review, issued        Freeze, breathe, squeeze   Review, issued review       Managing IAP/exhale on exertion (lifting, bending, transfers)                    Gait Training                              Modalities

## 2025-01-08 NOTE — PROGRESS NOTES
"CC: \"Leak when walking, getting better since doing pelvic floor PT the past 3 weeks.  Leak with coughing and sneezing,  Still some urgency and leakage on the way, but that's a little better also\"    Denies pain /burning with voiding  Has sensation of incomplete emptying at times    Voided prior to appointment, straight catheterized for 225 ml (had 1 cup of coffee this morning)    CMG:       Position:  sitting / standing       Fill sensation:    8 ml,     pdet     -1 cmH2O       First urge:       116 ml,     pdet     3 cmH2O        Normal urge:  379 ml,     pdet   13 cmH2O  \  Permission to void:              601 ml,     pdet     12 cmH2O       Max pdet during void    9 cm H2O       Voided volume:   600.5 ml    Bladder stability:   stable          Compliance:  normal         Sphincter function  Unable to provoke RACHEL sitting at 100, 200, 300 and 400 ml  No RACHEL standing at 400 ml with   No RACHEL standing at 500 ml with and without catheter  No RACHEL standing at 600 ml    EMG activity:       Normal during filling,        abnormal during voiding    Comments:  + EMG activity during voiding  Unable to provoke RACHEL    Urodynamics    Date/Time: 1/8/2025 8:30 AM    Performed by: Corrie Olivia RN  Authorized by: Zack Elaine MD  Universal Protocol:  procedure performed by consultantConsent: Verbal consent obtained. Written consent obtained.  Risks and benefits: risks, benefits and alternatives were discussed  Consent given by: patient  Patient understanding: patient states understanding of the procedure being performed  Patient consent: the patient's understanding of the procedure matches consent given  Procedure consent: procedure consent matches procedure scheduled  Patient identity confirmed: verbally with patient  Procedure - Urodynamics:  Procedure details: CMG and EMG      Voiding Pressure Study: Yes    Intra-abdominal Voiding Pressure Study: Yes    Post-procedure:     Patient tolerance: Patient tolerated procedure well " with no immediate complications

## 2025-01-13 ENCOUNTER — OFFICE VISIT (OUTPATIENT)
Dept: FAMILY MEDICINE CLINIC | Facility: CLINIC | Age: 60
End: 2025-01-13
Payer: COMMERCIAL

## 2025-01-13 VITALS
BODY MASS INDEX: 24.04 KG/M2 | OXYGEN SATURATION: 99 % | WEIGHT: 140.8 LBS | DIASTOLIC BLOOD PRESSURE: 86 MMHG | TEMPERATURE: 99.1 F | HEIGHT: 64 IN | SYSTOLIC BLOOD PRESSURE: 138 MMHG | HEART RATE: 101 BPM

## 2025-01-13 DIAGNOSIS — Z12.31 ENCOUNTER FOR SCREENING MAMMOGRAM FOR BREAST CANCER: ICD-10-CM

## 2025-01-13 DIAGNOSIS — Z00.00 ANNUAL PHYSICAL EXAM: Primary | ICD-10-CM

## 2025-01-13 DIAGNOSIS — F41.9 ANXIETY: ICD-10-CM

## 2025-01-13 DIAGNOSIS — Z13.0 SCREENING FOR DEFICIENCY ANEMIA: ICD-10-CM

## 2025-01-13 DIAGNOSIS — K22.4 ESOPHAGEAL SPASM: ICD-10-CM

## 2025-01-13 DIAGNOSIS — L03.039 PARONYCHIA OF GREAT TOE: ICD-10-CM

## 2025-01-13 DIAGNOSIS — E78.2 MIXED HYPERLIPIDEMIA: ICD-10-CM

## 2025-01-13 PROCEDURE — 99396 PREV VISIT EST AGE 40-64: CPT | Performed by: FAMILY MEDICINE

## 2025-01-13 RX ORDER — ALPRAZOLAM 0.25 MG/1
0.25 TABLET ORAL
Qty: 30 TABLET | Refills: 0 | Status: SHIPPED | OUTPATIENT
Start: 2025-01-13

## 2025-01-13 RX ORDER — CEPHALEXIN 500 MG/1
1000 CAPSULE ORAL EVERY 12 HOURS SCHEDULED
Qty: 28 CAPSULE | Refills: 0 | Status: SHIPPED | OUTPATIENT
Start: 2025-01-13 | End: 2025-01-20

## 2025-01-13 NOTE — ASSESSMENT & PLAN NOTE
Orders:  •  ALPRAZolam (XANAX) 0.25 mg tablet; Take 1 tablet (0.25 mg total) by mouth daily at bedtime as needed for anxiety

## 2025-01-13 NOTE — PROGRESS NOTES
Adult Annual Physical  Name: Yuridia Santos      : 1965      MRN: [id number]  Encounter Provider: Giuliano Carias DO  Encounter Date: 2025   Encounter department: [facility]    Assessment & Plan  Annual physical exam  Physical exam unremarkable with exception of toe.  Immunizations: Screening and mammogram are current.  Would to follow-up with GYN for routine pelvic exam in near future.  Routine lab work ordered.       Encounter for screening mammogram for breast cancer    Orders:  •  Mammo screening bilateral w 3d and cad; Future    Anxiety    Orders:  •  ALPRAZolam (XANAX) 0.25 mg tablet; Take 1 tablet (0.25 mg total) by mouth daily at bedtime as needed for anxiety    Mixed hyperlipidemia    Orders:  •  Comprehensive metabolic panel; Future  •  Lipid Panel with Direct LDL reflex; Future  •  TSH, 3rd generation with Free T4 reflex; Future    Esophageal spasm         Screening for deficiency anemia    Orders:  •  CBC and differential; Future    Paronychia of great toe    Orders:  •  cephalexin (KEFLEX) 500 mg capsule; Take 2 capsules (1,000 mg total) by mouth every 12 (twelve) hours for 7 days    Immunizations and preventive care screenings were discussed with patient today. Appropriate education was printed on patient's after visit summary.    Counseling:  Alcohol/drug use: discussed moderation in alcohol intake, the recommendations for healthy alcohol use, and avoidance of illicit drug use.  Dental Health: discussed importance of regular tooth brushing, flossing, and dental visits.  Injury prevention: discussed safety/seat belts, safety helmets, smoke detectors, carbon monoxide detectors, and smoking near bedding or upholstery.  Exercise: the importance of regular exercise/physical activity was discussed. Recommend exercise 3-5 times per week for at least 30 minutes.          History of Present Illness     Adult Annual Physical:  Patient presents for annual physical. Patient  "was seen for annual physical exam.  Overall doing well with the exception of some tenderness around the nailbed of her right great toe.  It occurred after wearing hiking boots for a long walk..     Diet and Physical Activity:  - Diet/Nutrition: well balanced diet and consuming 3-5 servings of fruits/vegetables daily.  - Exercise: walking, 3-4 times a week on average and strength training exercises.    General Health:  - Sleep: 7-8 hours of sleep on average.  - Hearing: normal hearing bilateral ears.  - Vision: most recent eye exam < 1 year ago, goes for regular eye exams and wears glasses.  - Dental: regular dental visits.    /GYN Health:  - Follows with GYN: no.   - Menopause: postmenopausal.   - Contraception: menopause.      Review of Systems   Constitutional: Negative.    HENT: Negative.  Negative for congestion, ear pain, hearing loss, nosebleeds, sore throat and trouble swallowing.    Eyes: Negative.    Respiratory:  Negative for apnea, cough, chest tightness, shortness of breath and wheezing.    Cardiovascular: Negative.    Gastrointestinal:  Negative for abdominal pain, blood in stool, constipation, diarrhea, nausea and vomiting.   Endocrine: Negative.    Genitourinary:  Negative for difficulty urinating, dysuria, frequency, hematuria and urgency.   Musculoskeletal:  Negative for arthralgias, joint swelling and myalgias.   Skin:  Negative for rash.        Pain swelling base of toenail   Neurological:  Negative for dizziness, syncope, light-headedness, numbness and headaches.   Hematological: Negative.    Psychiatric/Behavioral:  Negative for confusion, dysphoric mood and sleep disturbance. The patient is not nervous/anxious.          Objective   /86 (BP Location: Left arm, Patient Position: Sitting, Cuff Size: Standard)   Pulse 101   Temp 99.1 °F (37.3 °C) (Temporal)   Ht 5' 4\" (1.626 m)   Wt 63.9 kg (140 lb 12.8 oz)   LMP  (LMP Unknown)   SpO2 99%   BMI 24.17 kg/m²     Physical Exam  Vitals " and nursing note reviewed.   Constitutional:       General: She is not in acute distress.     Appearance: She is well-developed.   HENT:      Head: Normocephalic and atraumatic.   Eyes:      Conjunctiva/sclera: Conjunctivae normal.   Cardiovascular:      Rate and Rhythm: Normal rate and regular rhythm.      Heart sounds: No murmur heard.  Pulmonary:      Effort: Pulmonary effort is normal. No respiratory distress.      Breath sounds: Normal breath sounds.   Abdominal:      Palpations: Abdomen is soft.      Tenderness: There is no abdominal tenderness.   Musculoskeletal:         General: No swelling.      Cervical back: Neck supple.   Skin:     General: Skin is warm and dry.      Capillary Refill: Capillary refill takes less than 2 seconds.      Comments: Erythema tenderness nailbed right great toe   Neurological:      Mental Status: She is alert.   Psychiatric:         Mood and Affect: Mood normal.

## 2025-01-15 ENCOUNTER — OFFICE VISIT (OUTPATIENT)
Dept: PHYSICAL THERAPY | Facility: REHABILITATION | Age: 60
End: 2025-01-15
Payer: COMMERCIAL

## 2025-01-15 DIAGNOSIS — N39.3 STRESS INCONTINENCE: ICD-10-CM

## 2025-01-15 DIAGNOSIS — R35.0 FREQUENCY OF URINATION: Primary | ICD-10-CM

## 2025-01-15 PROCEDURE — 97530 THERAPEUTIC ACTIVITIES: CPT | Performed by: PHYSICAL THERAPIST

## 2025-01-15 PROCEDURE — 97112 NEUROMUSCULAR REEDUCATION: CPT | Performed by: PHYSICAL THERAPIST

## 2025-01-15 PROCEDURE — 97110 THERAPEUTIC EXERCISES: CPT | Performed by: PHYSICAL THERAPIST

## 2025-01-15 NOTE — PROGRESS NOTES
Daily Note     Today's date: 1/15/2025  Patient name: Yuridia Santos  : 1965  MRN: [id number]  Referring provider: Giuliano Hoffman MD  Dx:   Encounter Diagnosis     ICD-10-CM    1. Frequency of urination  R35.0       2. Stress incontinence  N39.3           Start Time: 09  Stop Time: 1032  Total time in clinic (min): 58 minutes    Subjective: Pt did her internal stretches 1x. Pt reports the positioning was better, does not need pelvic wand at this time. Pt reports compliance with HEP. Pt reports her urgency has been better. One day she had a lot of urgency at her PCP well visit and she used urge deferral which helped. Pt had incontinence 1 x since last visit. She went without a pad for her walk but she used toilet paper just in case and had a little leak when she started but reports she did not sit to pee and she did not feel like she emptied all the way. She also reports it was cold out and she always has more urgency with that. Pt feels her BM's are much more normal, not constipated stool.       Objective: See treatment diary below      Assessment: PT cued pt on proper voiding mechanics and posture with simulated squat position to improve ease of voiding coupled with deep breathing to support progression of stream to a flow avoiding pushing and straining to start or speed up the stream. Pt verbalize understanding of this and demo simulated squat position in clinic. Patient performed recumbent bike aerobic exercise to increase blood flow to the area being treated, prepare the muscles for strength training and stretching, improve overall tolerance to activity, and aerobic endurance. PT progressed proximal hip strength with focus on coordination of PFM/TAC and breathing.  During proximal hip strengthening, PT cued pt to avoid isolating upper abdominals during stabilization to avoid downward pressure on bladder and instead focus on full abdominal contraction with a bottom-up approach to tale pressure off  "of bladder.  Tolerated treatment well. Patient would benefit from continued PT      Plan: Continue per plan of care.      Impairments/functional limitations: low back pain, hx of cauda equina, RACHEL, urgency and incontinence, urinary frequency, constipation, OI/post pelvic floor tightness, hip IR tightness   Daily Treatment Diary    Manuals 12/4 12/11 12/18 1/8 1/15     External vaginal assess Next 1-2 visits nv performed       Internal vaginal assess Next 1-2 visits  nv performed       Internal manual therapy   bulbospongiosus, ischiocavernosus R>L,   periurethral muscles R>L, coccygeus L       Hip IR PROM, post hip mob     nv     Neuro Re-Ed          The knack   Review  Review, continue  HEP                                   TAC    review      PF   Review  review      PF+TAC    Review, 10\"x10       PF+TAC with march    X10 ea D/c     PF+TAC with alt knee fall out    X10 ea D/c     PF+TAC with ball squeeze    5\"x10 D/c     PF+TAC with SLR     X10 ea     PF+TAC with s/l ABD     x10 ea     PF+TAC with bridge    3\"x10 HEP     PF+TAC with prone hip ext     X10 ea                                   360 deg breathing  Review, sitting, lateral rib expansion Review, in bed, R sidelying        Ther Ex          Self internal STM   Layer 1, issued  Review, position, possible pelvic wand Review, frequency, most days/week     Bike    10 mins L1  10 mins L1      Supine happy baby with 360 deg breathing   1 mins  HEP, review HEP     Seated piri stretch   30\"x2 ea HEP review HEP     Supine butterfly stretch nv         Supine hip IR/ER AROM  5\"x5 ea  Hold on L 5\"x5 R HEP review  HEP                                   Ther Activity          Bowel mechanics      prn     Voiding mechanics      Review      Healthy bladder habits    Review, voiding JIC        Bladder Diary  Review, issued review        Urge delay:QF  Review, issued        Urge delay: HR  Review, issued        Freeze, breathe, squeeze   Review, issued review  Review   "   Managing IAP/exhale on exertion (lifting, bending, transfers)                    Gait Training                              Modalities

## 2025-01-17 ENCOUNTER — TELEPHONE (OUTPATIENT)
Age: 60
End: 2025-01-17

## 2025-01-17 ENCOUNTER — RESULTS FOLLOW-UP (OUTPATIENT)
Dept: FAMILY MEDICINE CLINIC | Facility: CLINIC | Age: 60
End: 2025-01-17

## 2025-01-17 DIAGNOSIS — E78.2 MIXED HYPERLIPIDEMIA: Primary | ICD-10-CM

## 2025-01-17 LAB
ALBUMIN SERPL-MCNC: 4.5 G/DL (ref 3.6–5.1)
ALBUMIN/GLOB SERPL: 2 (CALC) (ref 1–2.5)
ALP SERPL-CCNC: 63 U/L (ref 37–153)
ALT SERPL-CCNC: 19 U/L (ref 6–29)
AST SERPL-CCNC: 18 U/L (ref 10–35)
BASOPHILS # BLD AUTO: 50 CELLS/UL (ref 0–200)
BASOPHILS NFR BLD AUTO: 1 %
BILIRUB SERPL-MCNC: 0.6 MG/DL (ref 0.2–1.2)
BUN SERPL-MCNC: 14 MG/DL (ref 7–25)
BUN/CREAT SERPL: ABNORMAL (CALC) (ref 6–22)
CALCIUM SERPL-MCNC: 9.5 MG/DL (ref 8.6–10.4)
CHLORIDE SERPL-SCNC: 103 MMOL/L (ref 98–110)
CHOLEST SERPL-MCNC: 296 MG/DL
CHOLEST/HDLC SERPL: 3.7 (CALC)
CO2 SERPL-SCNC: 29 MMOL/L (ref 20–32)
CREAT SERPL-MCNC: 0.83 MG/DL (ref 0.5–1.05)
EOSINOPHIL # BLD AUTO: 390 CELLS/UL (ref 15–500)
EOSINOPHIL NFR BLD AUTO: 7.8 %
ERYTHROCYTE [DISTWIDTH] IN BLOOD BY AUTOMATED COUNT: 12.3 % (ref 11–15)
GFR/BSA.PRED SERPLBLD CYS-BASED-ARV: 81 ML/MIN/1.73M2
GLOBULIN SER CALC-MCNC: 2.2 G/DL (CALC) (ref 1.9–3.7)
GLUCOSE SERPL-MCNC: 100 MG/DL (ref 65–99)
HCT VFR BLD AUTO: 39.8 % (ref 35–45)
HDLC SERPL-MCNC: 79 MG/DL
HGB BLD-MCNC: 13.4 G/DL (ref 11.7–15.5)
LDLC SERPL CALC-MCNC: 190 MG/DL (CALC)
LYMPHOCYTES # BLD AUTO: 1760 CELLS/UL (ref 850–3900)
LYMPHOCYTES NFR BLD AUTO: 35.2 %
MCH RBC QN AUTO: 30.7 PG (ref 27–33)
MCHC RBC AUTO-ENTMCNC: 33.7 G/DL (ref 32–36)
MCV RBC AUTO: 91.3 FL (ref 80–100)
MONOCYTES # BLD AUTO: 370 CELLS/UL (ref 200–950)
MONOCYTES NFR BLD AUTO: 7.4 %
NEUTROPHILS # BLD AUTO: 2430 CELLS/UL (ref 1500–7800)
NEUTROPHILS NFR BLD AUTO: 48.6 %
NONHDLC SERPL-MCNC: 217 MG/DL (CALC)
PLATELET # BLD AUTO: 260 THOUSAND/UL (ref 140–400)
PMV BLD REES-ECKER: 11 FL (ref 7.5–12.5)
POTASSIUM SERPL-SCNC: 4 MMOL/L (ref 3.5–5.3)
PROT SERPL-MCNC: 6.7 G/DL (ref 6.1–8.1)
RBC # BLD AUTO: 4.36 MILLION/UL (ref 3.8–5.1)
SODIUM SERPL-SCNC: 140 MMOL/L (ref 135–146)
TRIGL SERPL-MCNC: 134 MG/DL
TSH SERPL-ACNC: 1.94 MIU/L (ref 0.4–4.5)
WBC # BLD AUTO: 5 THOUSAND/UL (ref 3.8–10.8)

## 2025-01-17 RX ORDER — ATORVASTATIN CALCIUM 10 MG/1
10 TABLET, FILM COATED ORAL DAILY
Qty: 90 TABLET | Refills: 3 | Status: SHIPPED | OUTPATIENT
Start: 2025-01-17 | End: 2025-01-20 | Stop reason: ALTCHOICE

## 2025-01-20 DIAGNOSIS — E78.2 MIXED HYPERLIPIDEMIA: Primary | ICD-10-CM

## 2025-01-20 RX ORDER — ROSUVASTATIN CALCIUM 10 MG/1
10 TABLET, COATED ORAL DAILY
Qty: 90 TABLET | Refills: 3 | Status: SHIPPED | OUTPATIENT
Start: 2025-01-20

## 2025-01-22 ENCOUNTER — OFFICE VISIT (OUTPATIENT)
Dept: PHYSICAL THERAPY | Facility: REHABILITATION | Age: 60
End: 2025-01-22
Payer: COMMERCIAL

## 2025-01-22 DIAGNOSIS — R35.0 FREQUENCY OF URINATION: Primary | ICD-10-CM

## 2025-01-22 DIAGNOSIS — N39.3 STRESS INCONTINENCE: ICD-10-CM

## 2025-01-22 PROCEDURE — 97110 THERAPEUTIC EXERCISES: CPT | Performed by: PHYSICAL THERAPIST

## 2025-01-22 PROCEDURE — 97112 NEUROMUSCULAR REEDUCATION: CPT | Performed by: PHYSICAL THERAPIST

## 2025-01-22 NOTE — PROGRESS NOTES
Daily Note     Today's date: 2025  Patient name: Yuridia Santos  : 1965  MRN: [id number]  Referring provider: Giuliano Hoffman MD  Dx:   Encounter Diagnosis     ICD-10-CM    1. Frequency of urination  R35.0       2. Stress incontinence  N39.3           Start Time: 07  Stop Time: 08  Total time in clinic (min): 59 minutes    Subjective: Pt reports she is doing her HEP. She reports 1 walk when she had some intermittent leakage. She also reports 1 instance of incontinence when she was standing in a store, she did not have an urge. It was a fast walk. She played pickleball for 2 hours without leakage. Pt was on an antibiotic and was dealing with loose stools. She finished antibiotic 2 days ago.       Objective: See treatment diary below      Assessment: PT discussed use of sEMG biofeedback for NMRE to improve awareness of PFM strength, endurance and relaxation. Pt would like to consider this for NV PT cued pt on pressure RACHEL incontinence which may be related to imbalance between PFM endurance, upper ab strength and/or breathing mechanics. Patient performed recumbent bike aerobic exercise to increase blood flow to the area being treated, prepare the muscles for strength training and stretching, improve overall tolerance to activity, and aerobic endurance. PT cued pt to perform PFM/abdominal contraction on bike intermittently assuring she is not compensating with upper abdominal gripping and directing pressure downward. Pt can focus on this when walking as well. PT progressed POC with standing hip 3 way with focus on good load transfer and coordination of PFM/abdominals without pressure generated  down on pelvic floor. PT initiated post hip mobs for decrease dhip IR ROM b/l.   Tolerated treatment well. Patient would benefit from continued PT    Plan: Continue per plan of care.      Impairments/functional limitations: low back pain, hx of cauda equina, RACHEL, urgency and incontinence, urinary frequency,  "constipation, OI/post pelvic floor tightness, hip IR tightness   Daily Treatment Diary    Manuals 12/4 12/11 12/18 1/8 1/15 1/22    External vaginal assess Next 1-2 visits nv performed       Internal vaginal assess Next 1-2 visits  nv performed       Internal manual therapy   bulbospongiosus, ischiocavernosus R>L,   periurethral muscles R>L, coccygeus L       Hip IR PROM, post hip mob     nv 4 mins    Neuro Re-Ed          The knack   Review  Review, continue  HEP                                   TAC    review      PF   Review  review      PF+TAC    Review, 10\"x10       PF+TAC with SLR     X10 ea HEP    PF+TAC with s/l ABD     x10 ea HEP    PF+TAC with bridge    3\"x10 HEP     PF+TAC with prone hip ext     X10 ea HEP    sEMG biofeedback       prn    SLS      Review, 30\"x2 ea    Standing posture; ribcage over pelvis, pubic bone/ponytail lift      Review     Dying bug       5 ea, towel roll under sacrum              360 deg breathing  Review, sitting, lateral rib expansion Review, in bed, R sidelying        Ther Ex          Self internal STM   Layer 1, issued  Review, position, possible pelvic wand Review, frequency, most days/week     Bike    10 mins L1  10 mins L1  10 mins L2    Supine happy baby with 360 deg breathing   1 mins  HEP, review HEP     Seated piri stretch   30\"x2 ea HEP review HEP     Supine butterfly stretch nv         Supine hip IR/ER AROM  5\"x5 ea  Hold on L 5\"x5 R HEP review  HEP                Standing hip ABD      X10     Standing hip ext      X10     Standing march      x10              Ther Activity          Bowel mechanics      prn     Voiding mechanics      Review      Healthy bladder habits    Review, voiding JIC        Bladder Diary  Review, issued review        Urge delay:QF  Review, issued        Urge delay: HR  Review, issued        Freeze, breathe, squeeze   Review, issued review  Review     Managing IAP/exhale on exertion (lifting, bending, transfers)                    Gait Training     "                          Modalities

## 2025-01-29 ENCOUNTER — OFFICE VISIT (OUTPATIENT)
Dept: UROLOGY | Facility: MEDICAL CENTER | Age: 60
End: 2025-01-29
Payer: COMMERCIAL

## 2025-01-29 ENCOUNTER — APPOINTMENT (OUTPATIENT)
Dept: PHYSICAL THERAPY | Facility: REHABILITATION | Age: 60
End: 2025-01-29
Payer: COMMERCIAL

## 2025-01-29 VITALS
BODY MASS INDEX: 23.9 KG/M2 | WEIGHT: 140 LBS | SYSTOLIC BLOOD PRESSURE: 130 MMHG | DIASTOLIC BLOOD PRESSURE: 84 MMHG | HEIGHT: 64 IN | OXYGEN SATURATION: 97 % | HEART RATE: 101 BPM

## 2025-01-29 DIAGNOSIS — N31.9 NEUROGENIC BLADDER: Primary | ICD-10-CM

## 2025-01-29 PROCEDURE — 99213 OFFICE O/P EST LOW 20 MIN: CPT | Performed by: UROLOGY

## 2025-01-29 RX ORDER — MOMETASONE FUROATE AND FORMOTEROL FUMARATE DIHYDRATE 50; 5 UG/1; UG/1
AEROSOL RESPIRATORY (INHALATION)
COMMUNITY

## 2025-01-29 NOTE — ASSESSMENT & PLAN NOTE
Concern for neurogenic bladder given urinary symptoms and history of cauda equina syndrome s/p spinal decompression with subsequent atrophy of muscles in leg  UDS: normal bladder capacity, normal compliance, no obstruction, but contraction of sphincter during voiding  DSD versus dysfunctional voididn  Renal sonogram shows no hydro with normal renal function  Improvements in urinary symptoms with pelvic floor PT  - continue pelvic floor PT  - no bothersome symptoms at this time and normal upper tracts on sonogram with normal renal function  - repeat renal sono and BMP in 1 year  - patient to notify office of any changes in urinary symptoms    Orders:    US kidney and bladder with pvr; Future

## 2025-01-29 NOTE — PROGRESS NOTES
Name: Yuridia Santos      : 1965      MRN: [id number]  Encounter Provider: Giuliano Hoffman MD  Encounter Date: 2025   Encounter department: [facility] [place]  :  Assessment & Plan  Neurogenic bladder  Concern for neurogenic bladder given urinary symptoms and history of cauda equina syndrome s/p spinal decompression with subsequent atrophy of muscles in leg  UDS: normal bladder capacity, normal compliance, no obstruction, but contraction of sphincter during voiding  DSD versus dysfunctional voididn  Renal sonogram shows no hydro with normal renal function  Improvements in urinary symptoms with pelvic floor PT  - continue pelvic floor PT  - no bothersome symptoms at this time and normal upper tracts on sonogram with normal renal function  - repeat renal sono and BMP in 1 year  - patient to notify office of any changes in urinary symptoms    Orders:    US kidney and bladder with pvr; Future        History of Present Illness   Yuridia Santos is a 59 y.o. female who presents for UDS results  Reports improvements in urinary symptoms since start pelvic floor PT  No bothersome LUTS at this time      Review of Systems   All other systems reviewed and are negative.    Past Medical History   Past Medical History:   Diagnosis Date    Allergic 1998    Asthma     Cauda equina compression (HCC)     Dysuria 2012    Esophageal spasm 2012    Resolved:  2017    GERD (gastroesophageal reflux disease)     H/O oral surgery     Migraine     Peripheral neuropathy     Shingles 2016    Skin disorder 2013    Resolved:  2017     Past Surgical History:   Procedure Laterality Date    BONE GRAFT      In mouth after tooth extraction     SECTION      COLONOSCOPY      LAMINECTOMY Bilateral     L4 and L5    MOUTH SURGERY      SPINE SURGERY  21    Bilateral laminectomy and 2 disc repairs    TOOTH EXTRACTION      WISDOM TOOTH EXTRACTION       Family  History   Problem Relation Age of Onset    Diabetes Mother     Polymyalgia rheumatica Mother     Arthritis Mother     Hypertension Mother     Alcohol abuse Father     Asthma Father     Heart attack Father     Skin cancer Sister 42    Ataxia Sister         cerebella ataxia    No Known Problems Maternal Aunt     No Known Problems Paternal Aunt     Dementia Maternal Grandmother     Psychiatric Illness Maternal Grandmother     Parkinsonism Maternal Grandfather     No Known Problems Paternal Grandmother     No Known Problems Paternal Grandfather     Depression Daughter     Anxiety disorder Daughter     Thyroid cancer Son 22    Asthma Son     Cancer Son     Completed Suicide  Cousin     Mental illness Neg Hx     Substance Abuse Neg Hx       reports that she has never smoked. She has never been exposed to tobacco smoke. She has never used smokeless tobacco. She reports current alcohol use of about 2.0 standard drinks of alcohol per week. She reports that she does not use drugs.  Current Outpatient Medications on File Prior to Visit   Medication Sig Dispense Refill    albuterol (PROVENTIL HFA,VENTOLIN HFA) 90 mcg/act inhaler INHALE 2 PUFFS BY MOUTH EVERY 4 HOURS AS NEEDED VIA SPACER      ALPRAZolam (XANAX) 0.25 mg tablet Take 1 tablet (0.25 mg total) by mouth daily at bedtime as needed for anxiety 30 tablet 0    budesonide-formoterol (SYMBICORT) 80-4.5 MCG/ACT inhaler Inhale 2 puffs 2 (two) times a day      Cholecalciferol 10 MCG (400 UNIT) CAPS Take 1,000 Units by mouth      Cyanocobalamin (B-12) 5000 MCG SUBL Place 5,000 mcg under the tongue daily      cyclobenzaprine (FLEXERIL) 10 mg tablet Take 1 tablet (10 mg total) by mouth 3 (three) times a day as needed for muscle spasms 30 tablet 0    diphenhydrAMINE (BENADRYL) 25 mg capsule Take by mouth      hyoscyamine (ANASPAZ,LEVSIN) 0.125 MG tablet Take 1 tablet (0.125 mg total) by mouth every 4 (four) hours as needed for cramping 30 tablet 0    ibuprofen (MOTRIN) 200 mg  tablet Take by mouth      lidocaine (XYLOCAINE) 5 % ointment Apply 1 application. topically as needed      nitroglycerin (NITROSTAT) 0.4 mg SL tablet Place 1 tablet (0.4 mg total) under the tongue every 5 (five) minutes as needed (esophageal spasm) 25 tablet 2    omeprazole (PriLOSEC) 20 mg delayed release capsule TAKE 1 CAPSULE BY MOUTH EVERY DAY 90 capsule 1    rosuvastatin (CRESTOR) 10 MG tablet Take 1 tablet (10 mg total) by mouth daily 90 tablet 3    Methylcobalamin (B-12) 5000 MCG TBDP Take 5,000 mcg by mouth in the morning (Patient not taking: Reported on 1/13/2025)      methylPREDNISolone 4 MG tablet therapy pack Use as directed on package (Patient not taking: Reported on 1/8/2025) 21 each 0    Mometasone Furo-Formoterol Fum (Dulera) 50-5 MCG/ACT AERO  (Patient not taking: Reported on 1/29/2025)       No current facility-administered medications on file prior to visit.     Allergies   Allergen Reactions    Azithromycin Swelling     Other reaction(s): swelling    Bactrim [Sulfamethoxazole-Trimethoprim] Swelling    Cetirizine Swelling     Zyrtec    Codeine Swelling    Sulfa Antibiotics Hives    Sulfasalazine Hives         Objective   LMP  (LMP Unknown)     Physical Exam  Vitals and nursing note reviewed.   Constitutional:       General: She is not in acute distress.     Appearance: She is well-developed.   HENT:      Head: Normocephalic and atraumatic.   Eyes:      Conjunctiva/sclera: Conjunctivae normal.   Cardiovascular:      Rate and Rhythm: Normal rate and regular rhythm.      Heart sounds: No murmur heard.  Pulmonary:      Effort: Pulmonary effort is normal. No respiratory distress.      Breath sounds: Normal breath sounds.   Abdominal:      Palpations: Abdomen is soft.      Tenderness: There is no abdominal tenderness.   Musculoskeletal:         General: No swelling.      Cervical back: Neck supple.   Skin:     General: Skin is warm and dry.      Capillary Refill: Capillary refill takes less than 2  "seconds.   Neurological:      Mental Status: She is alert.   Psychiatric:         Mood and Affect: Mood normal.          Results  No results found for: \"PSA\"  Lab Results   Component Value Date    CALCIUM 9.5 01/16/2025    K 4.0 01/16/2025    CO2 29 01/16/2025     01/16/2025    BUN 14 01/16/2025    CREATININE 0.83 01/16/2025     Lab Results   Component Value Date    WBC 5.0 01/16/2025    HGB 13.4 01/16/2025    HCT 39.8 01/16/2025    MCV 91.3 01/16/2025     01/16/2025       Office Urine Dip  No results found for this or any previous visit (from the past hour).]      "

## 2025-02-02 DIAGNOSIS — K21.9 GASTROESOPHAGEAL REFLUX DISEASE WITHOUT ESOPHAGITIS: ICD-10-CM

## 2025-02-05 ENCOUNTER — APPOINTMENT (OUTPATIENT)
Dept: PHYSICAL THERAPY | Facility: REHABILITATION | Age: 60
End: 2025-02-05
Payer: COMMERCIAL

## 2025-02-07 ENCOUNTER — OFFICE VISIT (OUTPATIENT)
Dept: PHYSICAL THERAPY | Facility: REHABILITATION | Age: 60
End: 2025-02-07
Payer: COMMERCIAL

## 2025-02-07 DIAGNOSIS — N39.3 STRESS INCONTINENCE: ICD-10-CM

## 2025-02-07 DIAGNOSIS — R35.0 FREQUENCY OF URINATION: Primary | ICD-10-CM

## 2025-02-07 PROCEDURE — 97112 NEUROMUSCULAR REEDUCATION: CPT | Performed by: PHYSICAL THERAPIST

## 2025-02-07 NOTE — PROGRESS NOTES
Daily Note     Today's date: 2025  Patient name: Yuridia Santos  : 1965  MRN: [id number]  Referring provider: Giuliano Hoffman MD  Dx:   Encounter Diagnosis     ICD-10-CM    1. Frequency of urination  R35.0       2. Stress incontinence  N39.3           Start Time: 0800  Stop Time: 0858  Total time in clinic (min): 58 minutes    Subjective: Pt has been busy, working and travelling. Pt is a . Pt had a big leakage during 1 walk last week, occurred in the middle of the walk. She had another walk that caused leakage sporadically throughout. Pt was sick and she leaked with coughing and sometimes she was able to contract and other times she was not able to control it. Pt saw urology and was told her sphincter contracts during voiding. Pt notices leaning forward helps when she is voiding. Pt does feel sometimes she is luci to get her pee out, especially in the morning.       Objective: See treatment diary below. Utilized SEMg biofeedback with pt's consent with electrodes x2 placed lateral to EAS and one on R upper buttocks. Removed at end of session. Pt declined second person in the room.         Assessment: PT cued pt on continuing voiding mechanics and especially in the morning and to focus on 360 deg breathing prior to going to the toilet. PT initiated sEMG biofeedback for NMRE to improve pts awareness of PFM relaxation and activation. Pt initially presented with PFM activation in sitting. Upon observation, PT able to cue pt to avoid forceful exhale when quiet breathing in sitting which helped relieve PFM activation however pt challenged with this. In standing, pt demos abdominal gripping and PFM contraction for fear of incontinence but able to temporarily and intermittently relax abdominals and PFM however much more challenged in standing. Pt to focus on abdominal relaxation and decreasing forceful exhales when sitting, standing and walking and assess response with incontinence as  "forceful exhale can contribute to PFM over-activity and pressure down on PFM. Tolerated treatment well. Patient would benefit from continued PT      Plan: Continue per plan of care.  Pt will trial 10 min - 20 mins walks over the weekend and monitoring leakage symptoms.      Impairments/functional limitations: low back pain, hx of cauda equina, RACHEL, urgency and incontinence, urinary frequency, constipation, OI/post pelvic floor tightness, hip IR tightness   Daily Treatment Diary    Manuals 12/4 12/11 12/18 1/8 1/15 1/22 2/7   External vaginal assess Next 1-2 visits nv performed       Internal vaginal assess Next 1-2 visits  nv performed       Internal manual therapy   bulbospongiosus, ischiocavernosus R>L,   periurethral muscles R>L, coccygeus L       Hip IR PROM, post hip mob     nv 4 mins    Neuro Re-Ed          The knack   Review  Review, continue  HEP                                   TAC    review      PF   Review  review      PF+TAC    Review, 10\"x10       PF+TAC with SLR     X10 ea HEP    PF+TAC with s/l ABD     x10 ea HEP    PF+TAC with bridge    3\"x10 HEP     PF+TAC with prone hip ext     X10 ea HEP    sEMG biofeedback       prn Performed, sitting, standing    SLS      Review, 30\"x2 ea    Standing posture; ribcage over pelvis, pubic bone/ponytail lift      Review                         Dying bug       5 ea, towel roll under sacrum    Wall plank          360 deg breathing  Review, sitting, lateral rib expansion Review, in bed, R sidelying     Review, without forceful exhale    Ther Ex          Self internal STM   Layer 1, issued  Review, position, possible pelvic wand Review, frequency, most days/week     Bike    10 mins L1  10 mins L1  10 mins L2    Supine happy baby with 360 deg breathing   1 mins  HEP, review HEP     Seated piri stretch   30\"x2 ea HEP review HEP     Supine butterfly stretch nv         Supine hip IR/ER AROM  5\"x5 ea  Hold on L 5\"x5 R HEP review  HEP                Standing hip ABD      X10 "     Standing hip ext      X10     Standing march      x10              Ther Activity          Bowel mechanics      prn     Voiding mechanics      Review   Review, morning time , breathing   Healthy bladder habits    Review, voiding JIC        Bladder Diary  Review, issued review        Urge delay:QF  Review, issued        Urge delay: HR  Review, issued        Freeze, breathe, squeeze   Review, issued review  Review     Managing IAP/exhale on exertion (lifting, bending, transfers)                    Gait Training                              Modalities

## 2025-02-12 ENCOUNTER — OFFICE VISIT (OUTPATIENT)
Dept: PHYSICAL THERAPY | Facility: REHABILITATION | Age: 60
End: 2025-02-12
Payer: COMMERCIAL

## 2025-02-12 DIAGNOSIS — N39.3 STRESS INCONTINENCE: ICD-10-CM

## 2025-02-12 DIAGNOSIS — R35.0 FREQUENCY OF URINATION: Primary | ICD-10-CM

## 2025-02-12 PROCEDURE — 97110 THERAPEUTIC EXERCISES: CPT | Performed by: PHYSICAL THERAPIST

## 2025-02-12 PROCEDURE — 97530 THERAPEUTIC ACTIVITIES: CPT | Performed by: PHYSICAL THERAPIST

## 2025-02-12 PROCEDURE — 97112 NEUROMUSCULAR REEDUCATION: CPT | Performed by: PHYSICAL THERAPIST

## 2025-02-12 NOTE — PROGRESS NOTES
"Daily Note     Today's date: 2025  Patient name: Yuridia Santos  : 1965  MRN: [id number]  Referring provider: Giuliano Hoffman MD  Dx:   Encounter Diagnosis     ICD-10-CM    1. Frequency of urination  R35.0       2. Stress incontinence  N39.3           Start Time: 0830  Stop Time: 0930  Total time in clinic (min): 60 minutes    Subjective: Pt did a couple shorter walks by herself and had urinary leakage at very distinct times both times. She leaked as soon as she starts the ig hill and she was tensing her abdominals in preparation. Once she realized this, she relaxed her abdominals and did not have more leakage. Another time someone startled her and said hi ad she leaked at that point. The same thing happened midway of the hill because she was abdominally gripping again. Another person said hello but did not startle her and she focused on relaxing her abdominals and di not have leakage. On a  long walk with hills she did not leak and was very conscious of how she was moving.       Objective: See treatment diary below      Assessment: PT instructed pt on utilizing tactile cues for abdominal activation with a gathering/lifting of muscles form the bottom up rather than a EO dominant pattern that causes bearing down. Pt verbalized understanding of this and demo in clinic. On bike PT cued pt to perform this 10\" 1x every minute on the bike. PT issued split stance paloff press for core stabilization and endurance. At end of exercise, PT mentioned numbness in her R foot on lateral side that intermittently comes and goes and pt noticed during this exercise. PT rec pt trial it at home with a smaller split stance of still present, return to neutral stance, if still present, pause the exercise. Pt reported sh was lifting a lot of boxes yesterday which may have been a contributor to this symptom. PT initiated squat with focus on core activation and good post weight shift as well as quick return to encourage quick " "flick activation of PFM with focus on decreasing EO dominance. Tolerated treatment well. Patient would benefit from continued PT      Plan: Continue per plan of care.  Progress note during next visit.  Pt mentioned at end of session about intermittent saddle numbness on R side that has been present intermittently since cauda equina surgery, has not mentioned to neurologist/PCP, and PT rec she brings this concern up to them.      Impairments/functional limitations: low back pain, hx of cauda equina, RACHEL, urgency and incontinence, urinary frequency, constipation, OI/post pelvic floor tightness, hip IR tightness   Daily Treatment Diary    Manuals 2/12    1/15 1/22 2/7   External vaginal assess          Internal vaginal assess          Internal manual therapy          Hip IR PROM, post hip mob     nv 4 mins    Neuro Re-Ed          The knack     HEP                                   TAC          PF          PF+TAC Review, 10\"x5         PF+TAC with SLR     X10 ea HEP    PF+TAC with s/l ABD     x10 ea HEP    PF+TAC with bridge     HEP     PF+TAC with prone hip ext     X10 ea HEP    sEMG biofeedback       prn Performed, sitting, standing    SLS      Review, 30\"x2 ea    Standing posture; ribcage over pelvis, pubic bone/ponytail lift      Review               Paloff press  Split stance x10 ea L2         Dying bug  X10 ea     5 ea, towel roll under sacrum    Wall plank 1 min x1 forearm         Side plank Forearm 30\"x1 ea         360 deg breathing       Review, without forceful exhale    Ther Ex          Self internal STM     Review, frequency, most days/week     Bike 10 mins L2, 10\"x10    10 mins L1  10 mins L2    TM with incline intervals           Supine happy baby with 360 deg breathing     HEP     Seated piri stretch     HEP     Supine butterfly stretch          Supine hip IR/ER AROM      HEP                Standing hip ABD      X10     Standing hip ext      X10     Standing march      x10                                "   Mini squats with quick return  X10  Add wt         Ther Activity          Squat mechanics  Review                    Bowel mechanics      prn     Voiding mechanics      Review   Review, morning time , breathing   Healthy bladder habits           Bladder Diary           Urge delay:QF          Urge delay: HR          Freeze, breathe, squeeze      Review     Managing IAP/exhale on exertion (lifting, bending, transfers)                    Gait Training                              Modalities

## 2025-02-13 DIAGNOSIS — Z00.6 ENCOUNTER FOR EXAMINATION FOR NORMAL COMPARISON OR CONTROL IN CLINICAL RESEARCH PROGRAM: ICD-10-CM

## 2025-02-18 ENCOUNTER — APPOINTMENT (OUTPATIENT)
Dept: PHYSICAL THERAPY | Facility: REHABILITATION | Age: 60
End: 2025-02-18
Payer: COMMERCIAL

## 2025-02-19 ENCOUNTER — APPOINTMENT (OUTPATIENT)
Dept: PHYSICAL THERAPY | Facility: REHABILITATION | Age: 60
End: 2025-02-19
Payer: COMMERCIAL

## 2025-02-25 ENCOUNTER — OFFICE VISIT (OUTPATIENT)
Dept: PHYSICAL THERAPY | Facility: REHABILITATION | Age: 60
End: 2025-02-25
Payer: COMMERCIAL

## 2025-02-25 DIAGNOSIS — N39.3 STRESS INCONTINENCE: ICD-10-CM

## 2025-02-25 DIAGNOSIS — R35.0 FREQUENCY OF URINATION: Primary | ICD-10-CM

## 2025-02-25 PROCEDURE — 97110 THERAPEUTIC EXERCISES: CPT | Performed by: PHYSICAL THERAPIST

## 2025-02-25 PROCEDURE — 97140 MANUAL THERAPY 1/> REGIONS: CPT | Performed by: PHYSICAL THERAPIST

## 2025-02-25 NOTE — PROGRESS NOTES
"Daily Note     Today's date: 2025  Patient name: Yuridia Santos  : 1965  MRN: [id number]  Referring provider: Giuliano Hoffman MD  Dx:   Encounter Diagnosis     ICD-10-CM    1. Frequency of urination  R35.0       2. Stress incontinence  N39.3                      Subjective: Pt did a lot of walking and was active with spots and did not have any urinary leakage over the past 2 weeks. Pt feels she is 100% and does not need to continue with PT.        Objective: See treatment diary below    Pt provided verbal consent prior to and throughout objective assessment     TAC: good, no bearing down or EO gripping     MMT:   Left Right   Hip flex 4+ 4+   Hip ABD 4+ 4+   Hip ext 4+ 4   Hip IR 4+ 4+   Hip ER 4+ 4+   Knee flex 4+ 4+   Knee ext 4+ 4+   Ankle DF 4+ 4+               Special Tests:   Left Right   EKTA - -   FADIR - -   Post Thigh Thrust - -   Hip IR PROM 21 deg 20 deg    Hip ER PROM WNL WNL   Pop angle  WNL WNL                     Assessment: Pt presents with improved ROM and strength and overall improvement in urinary symptoms with resolution of urinary leakage or urgency with increased activity level. Pt has met all goals and is appropriate for d/c from skiled PT. Pt is in agreement with this POC.       Plan: Pt d/c'ed from skilled PT      Impairments/functional limitations: low back pain, hx of cauda equina, RACHEL, urgency and incontinence, urinary frequency, constipation, OI/post pelvic floor tightness, hip IR tightness   Daily Treatment Diary    Manuals 2/12 2/25   1/15 1/22 2/7   External vaginal assess          Internal vaginal assess          Internal manual therapy          Hip IR PROM, post hip mob     nv 4 mins    Re-eval   Performed         Neuro Re-Ed          The gladys     HEP                                   TAC          PF          PF+TAC Review, 10\"x5         PF+TAC with SLR     X10 ea HEP    PF+TAC with s/l ABD     x10 ea HEP    PF+TAC with bridge     HEP     PF+TAC with prone hip " "ext     X10 ea HEP    sEMG biofeedback       prn Performed, sitting, standing    SLS      Review, 30\"x2 ea    Standing posture; ribcage over pelvis, pubic bone/ponytail lift      Review               Paloff press  Split stance x10 ea L2 HEP        Dying bug  X10 ea HEP    5 ea, towel roll under sacrum    Wall plank 1 min x1 forearm HEP        Side plank Forearm 30\"x1 ea HEP        360 deg breathing       Review, without forceful exhale    Ther Ex          Self internal STM     Review, frequency, most days/week     Bike 10 mins L2, 10\"x10    10 mins L1  10 mins L2    TM with incline intervals           Supine happy baby with 360 deg breathing     HEP     Seated piri stretch     HEP     Supine butterfly stretch          Supine hip IR/ER AROM      HEP                Standing hip ABD  D/c    X10     Standing hip ext  D/c    X10     Standing march  D/c    x10    Side steps  2 laps        Monster walks  2 laps        FSU with march  2 laps         Mini squats with quick return  X10  Add wt         Ther Activity          Squat mechanics  Review                    Bowel mechanics      prn     Voiding mechanics      Review   Review, morning time , breathing   Healthy bladder habits           Bladder Diary           Urge delay:QF          Urge delay: HR          Freeze, breathe, squeeze      Review     Managing IAP/exhale on exertion (lifting, bending, transfers)                    Gait Training                              Modalities                                                         "

## 2025-03-04 ENCOUNTER — TELEPHONE (OUTPATIENT)
Age: 60
End: 2025-03-04

## 2025-03-04 DIAGNOSIS — M17.12 PRIMARY OSTEOARTHRITIS OF LEFT KNEE: Primary | ICD-10-CM

## 2025-03-04 NOTE — TELEPHONE ENCOUNTER
Caller: Patient    Doctor: Dr. Shepherd    Reason for call: Requesting Lt Knee Visco shots // last shot date was 08/15/2024 //Please advise    Call back#: [phone]

## 2025-03-05 ENCOUNTER — HOSPITAL ENCOUNTER (OUTPATIENT)
Dept: MAMMOGRAPHY | Facility: MEDICAL CENTER | Age: 60
Discharge: HOME | End: 2025-03-05
Payer: COMMERCIAL

## 2025-03-05 VITALS — HEIGHT: 64 IN | BODY MASS INDEX: 23.9 KG/M2 | WEIGHT: 139.99 LBS

## 2025-03-05 DIAGNOSIS — Z12.31 ENCOUNTER FOR SCREENING MAMMOGRAM FOR BREAST CANCER: ICD-10-CM

## 2025-03-05 PROCEDURE — 77067 SCR MAMMO BI INCL CAD: CPT

## 2025-03-05 PROCEDURE — 77063 BREAST TOMOSYNTHESIS BI: CPT

## 2025-03-26 ENCOUNTER — TELEPHONE (OUTPATIENT)
Age: 60
End: 2025-03-26

## 2025-03-26 NOTE — TELEPHONE ENCOUNTER
Patient would like to have her MMR vaccine and would like a call to schedule a nurse visit once it is ordered.

## 2025-03-27 DIAGNOSIS — Z23 ENCOUNTER FOR IMMUNIZATION: Primary | ICD-10-CM

## 2025-03-28 ENCOUNTER — PROCEDURE VISIT (OUTPATIENT)
Dept: OBGYN CLINIC | Facility: MEDICAL CENTER | Age: 60
End: 2025-03-28

## 2025-03-28 VITALS — WEIGHT: 140.2 LBS | BODY MASS INDEX: 23.93 KG/M2 | HEIGHT: 64 IN

## 2025-03-28 DIAGNOSIS — M17.12 PRIMARY OSTEOARTHRITIS OF LEFT KNEE: Primary | ICD-10-CM

## 2025-03-28 NOTE — PROGRESS NOTES
1. Primary osteoarthritis of left knee            Patient has Left knee osteoarthritis.  Patient is here for her first injection of Orthovisc into the left knee.   Physical exam of the knee shows no effusion no ecchymosis.  Patient tolerated procedure follow up 1 week Inj. #2    Large joint arthrocentesis: L knee  Universal Protocol:  procedure performed by consultantConsent: Verbal consent obtained. Written consent not obtained.  Risks and benefits: risks, benefits and alternatives were discussed  Consent given by: patient  Patient understanding: patient states understanding of the procedure being performed  Patient consent: the patient's understanding of the procedure matches consent given  Patient identity confirmed: verbally with patient  Supporting Documentation  Indications: pain and joint swelling   Procedure Details  Location: knee - L knee  Needle size: 22 G  Ultrasound guidance: no  Approach: anterolateral  Medications administered: 30 mg sodium hyaluronate 30 mg/2 mL    Patient tolerance: patient tolerated the procedure well with no immediate complications  Dressing:  Sterile dressing applied

## 2025-04-02 ENCOUNTER — CLINICAL SUPPORT (OUTPATIENT)
Dept: FAMILY MEDICINE CLINIC | Facility: CLINIC | Age: 60
End: 2025-04-02
Payer: COMMERCIAL

## 2025-04-02 DIAGNOSIS — Z23 ENCOUNTER FOR IMMUNIZATION: Primary | ICD-10-CM

## 2025-04-02 PROCEDURE — 90471 IMMUNIZATION ADMIN: CPT

## 2025-04-02 PROCEDURE — 90707 MMR VACCINE SC: CPT

## 2025-04-04 ENCOUNTER — PROCEDURE VISIT (OUTPATIENT)
Dept: OBGYN CLINIC | Facility: MEDICAL CENTER | Age: 60
End: 2025-04-04
Payer: COMMERCIAL

## 2025-04-04 VITALS — HEIGHT: 64 IN | BODY MASS INDEX: 23.39 KG/M2 | WEIGHT: 137 LBS

## 2025-04-04 DIAGNOSIS — M17.12 PRIMARY OSTEOARTHRITIS OF LEFT KNEE: Primary | ICD-10-CM

## 2025-04-04 PROCEDURE — 20610 DRAIN/INJ JOINT/BURSA W/O US: CPT | Performed by: PHYSICIAN ASSISTANT

## 2025-04-04 NOTE — PROGRESS NOTES
1. Primary osteoarthritis of left knee            Patient has Left knee osteoarthritis.  Patient is here for her 2nd injection of Orthovisc into the left knee.   Physical exam of the knee shows no effusion no ecchymosis.  Patient tolerated procedure follow up 1 week injection #3 to the left knee.    Large joint arthrocentesis: L knee  Universal Protocol:  procedure performed by consultantConsent: Verbal consent obtained. Written consent not obtained.  Risks and benefits: risks, benefits and alternatives were discussed  Consent given by: patient  Patient understanding: patient states understanding of the procedure being performed  Patient consent: the patient's understanding of the procedure matches consent given  Patient identity confirmed: verbally with patient  Supporting Documentation  Indications: pain and joint swelling   Procedure Details  Location: knee - L knee  Needle size: 22 G  Ultrasound guidance: no  Approach: anterolateral  Medications administered: 30 mg sodium hyaluronate 30 mg/2 mL    Patient tolerance: patient tolerated the procedure well with no immediate complications  Dressing:  Sterile dressing applied

## 2025-04-09 DIAGNOSIS — E78.2 MIXED HYPERLIPIDEMIA: ICD-10-CM

## 2025-04-10 RX ORDER — ROSUVASTATIN CALCIUM 10 MG/1
10 TABLET, COATED ORAL DAILY
Qty: 90 TABLET | Refills: 0 | Status: SHIPPED | OUTPATIENT
Start: 2025-04-10

## 2025-04-11 ENCOUNTER — PROCEDURE VISIT (OUTPATIENT)
Dept: OBGYN CLINIC | Facility: MEDICAL CENTER | Age: 60
End: 2025-04-11
Payer: COMMERCIAL

## 2025-04-11 VITALS — WEIGHT: 136 LBS | BODY MASS INDEX: 23.22 KG/M2 | HEIGHT: 64 IN

## 2025-04-11 DIAGNOSIS — G89.29 CHRONIC PAIN OF LEFT KNEE: ICD-10-CM

## 2025-04-11 DIAGNOSIS — M17.12 PRIMARY OSTEOARTHRITIS OF LEFT KNEE: Primary | ICD-10-CM

## 2025-04-11 DIAGNOSIS — M25.562 CHRONIC PAIN OF LEFT KNEE: ICD-10-CM

## 2025-04-11 PROCEDURE — 20610 DRAIN/INJ JOINT/BURSA W/O US: CPT | Performed by: PHYSICIAN ASSISTANT

## 2025-04-11 NOTE — PROGRESS NOTES
1. Primary osteoarthritis of left knee  Large joint arthrocentesis: L knee      2. Chronic pain of left knee            Patient has Left knee osteoarthritis.  Patient is here for her 3rd injection of Orthovisc into the left knee.   Physical exam of the knee shows no effusion no ecchymosis.  Patient tolerated procedure , will call when needs visco again to place order    Large joint arthrocentesis: L knee  Universal Protocol:  procedure performed by consultantConsent: Verbal consent obtained. Written consent not obtained.  Risks and benefits: risks, benefits and alternatives were discussed  Consent given by: patient  Patient understanding: patient states understanding of the procedure being performed  Patient consent: the patient's understanding of the procedure matches consent given  Patient identity confirmed: verbally with patient  Supporting Documentation  Indications: pain and joint swelling   Procedure Details  Location: knee - L knee  Needle size: 22 G  Ultrasound guidance: no  Approach: anterolateral  Medications administered: 30 mg sodium hyaluronate 30 mg/2 mL    Patient tolerance: patient tolerated the procedure well with no immediate complications  Dressing:  Sterile dressing applied

## 2025-04-22 NOTE — PROGRESS NOTES
Name: Yuridia Santos      : 1965      MRN: [id number]  Encounter Provider: PATSY Liao  Encounter Date: 2025   Encounter department: NEUROLOGY [facility]  :  Assessment & Plan  B12 deficiency  Noted on prior lab work up, plan to reassess.   Orders:    Vitamin B12; Future    Paresthesias  Patient with numbness and tingling of the tongue, lips, mouth-facial symptoms did improve with b12 supplementation, she remains on oral b12.  Does continue to note very occasional paresthesias of the roof of mouth and soft palate. Symptom is stable since last visit, no difficulty swallowing or changes in speech. She did have a repeat MRI brain which did show stability of  right cerebral hemisphere cavernoma and prominence of the right cerebellar medullary angle cistern suggesting the presence of a small arachnoid cyst.  Has seen neurosurgery in the past.     Again discussed the use of gabapentin for symptoms, declined today.        Cavernous malformation  Repeat MRI brain appears stable. Consider repeat study in 1 year.        Cervical radiculopathy  worsening neck pain with radicular complaints on the right. She does feel some of her activity of holding her grand daughter is contributing to this. She does utilize NSAIDs and cyclobenazprine, will add on magnesium supplement.   Could consider intervention with pain management. She has seen neurosurgery in the past, does not wish to pursue surgery at this time.              History of Present Illness   HPI   Yuridia Santos is a 59 y.o. female with PMHx of bilaterally laminectomy and diskectomy  L4-L5 emergently for cauda equina.  She presents today for follow-up visit.     Last office visit 2024 in which she was to obtain MRI brain for surveillance.         Interval history:  She does continue to get episodic numbness and tingling of the roof of the mouth and soft palate. Continues to mainly note when she is exerting herself such as  lifting light weights. More so on the right side. No trouble swallowing or issues with speech.   Neck pain is worse, does radiate down the right side some increased headaches with this as well.  Occurring about 3 times a week, feels this has been occurring as she has been babysitting her infant granddaugther.  Uses ibuprofen. Cyclobenzaprine qhs as needed.   No arm weakness. Shooting pain down the left arm hasn't been occurring recently.     Balance is the same. She can feel her balance is worse with th headaches and some vertigo type symptoms.   No falls.     She is having more numbness in the right foot-main the ball of the foot and 3 middle toes. Has been intermittent since her back surgery. Has found that squats have been aggravating this.     Recent b12 586 1/2023           Prior workup:    MRI brain 3/2024: No significant interval change. No mass effect, acute intracranial hemorrhage or evidence of recent infarction.   Stable right cerebral hemisphere cavernoma. No regional edema.   Stable prominence of the right cerebellar medullary angle cistern suggesting the presence of a small arachnoid cyst.    MRI brain 3/2025-stable      MRI cervical spine 12/2022: Cervical spondylosis associated with some spinal canal and neural foraminal narrowing most notably at the C5-C6 level.        Labs: 9/2022 sjogrens, b6, folate normal. b12 229  10/2021 A1c 5.9, TSH normal  6/2022 rheumatoid factor, uric acid, sed rate, CRP, NEY, Lyme all normal, negative     Prior hx:     Per chart review, was seen 12/2021 in which she was seen for imbalance like sensation.  She reported a few months following her laminectomy she felt a sensation of the ground moving like Jell-O while walking.  Also noted is been the sensation when looking down.  Symptoms varied in severity.  Also noted that looking at computer and phone would make her symptoms worse. Previously saw ENT and was diagnosed with PPPD (persistent postural perceptual dizziness)       Of note, Sister has been having ataxic symptoms. Diagnosed at [facility], with spinocerebellar ataxia, she also has POTS, HATS. Sister had genetic testing and was negative for any spinocerebellar ataxia genes. No other family members with known neurological history. Per the above evaluation her condition was not consistent with SCA.      More recently she noted paresthesias of the tongue lips and mouth in which repeat brain MRI was ordered, she did have low B12 in which she was started on supplementation.       Review of Systems   Constitutional:  Negative for appetite change, fatigue and fever.   HENT: Negative.  Negative for hearing loss, tinnitus, trouble swallowing and voice change.    Eyes: Negative.  Negative for photophobia, pain and visual disturbance.   Respiratory: Negative.  Negative for shortness of breath.    Cardiovascular: Negative.  Negative for palpitations.   Gastrointestinal: Negative.  Negative for nausea and vomiting.   Endocrine: Negative.  Negative for cold intolerance.   Genitourinary: Negative.  Negative for dysuria, frequency and urgency.   Musculoskeletal:  Positive for neck pain (slighlty worse). Negative for back pain, gait problem, myalgias and neck stiffness.   Skin: Negative.  Negative for rash.   Allergic/Immunologic: Negative.    Neurological:  Positive for numbness (b/l feet- severity  worse/ right foot primairly) and headaches (starts at the back of head 2-3 times a week). Negative for dizziness, tremors, seizures, syncope, facial asymmetry, speech difficulty, weakness and light-headedness.        Vertigo      Hematological: Negative.  Does not bruise/bleed easily.   Psychiatric/Behavioral: Negative.  Negative for confusion, hallucinations and sleep disturbance.    All other systems reviewed and are negative.  I have personally reviewed the MA's review of systems and made changes as necessary.         Objective   LMP  (LMP Unknown)     Physical Exam  Constitutional:       General:  She is awake.   Eyes:      General: Lids are normal.      Extraocular Movements: Extraocular movements intact.      Pupils: Pupils are equal, round, and reactive to light.   Neurological:      Mental Status: She is alert.      Motor: Motor strength is normal.     Deep Tendon Reflexes:      Reflex Scores:       Bicep reflexes are 2+ on the right side and 2+ on the left side.       Brachioradialis reflexes are 2+ on the right side and 2+ on the left side.       Patellar reflexes are 3+ on the right side and 3+ on the left side.       Achilles reflexes are 2+ on the right side and 2+ on the left side.  Psychiatric:         Speech: Speech normal.       Neurological Exam  Mental Status  Awake and alert. Oriented to person, place, time and situation. Speech is normal. Language is fluent with no aphasia.    Cranial Nerves  CN III, IV, VI: Extraocular movements intact bilaterally. Normal lids and orbits bilaterally. Pupils equal round and reactive to light bilaterally.  CN V: Facial sensation is normal.  CN VII: Full and symmetric facial movement.  CN VIII: Hearing is normal.  CN IX, X: Palate elevates symmetrically. Normal gag reflex.  CN XI: Shoulder shrug strength is normal.  CN XII: Tongue midline without atrophy or fasciculations.    Motor  Normal muscle bulk throughout. No fasciculations present. Strength is 5/5 throughout all four extremities.  Muscle tension and hypertrophy of the right sided cervical spine muscles.    Sensory  Light touch is normal in upper and lower extremities. Pinprick is normal in upper and lower extremities. Temperature is normal in upper and lower extremities. Vibration abnormality: Decreased vibratory R big toe, but otherwise normal throughout..     Reflexes                                            Right                      Left  Brachioradialis                    2+                         2+  Biceps                                 2+                         2+  Patellar                                 3+                         3+  Achilles                                2+                         2+  Right Plantar: downgoing  Left Plantar: downgoing    Right pathological reflexes: Isabelle's absent. Ankle clonus absent.  Left pathological reflexes: Isabelle's absent. Ankle clonus absent.    Coordination  Right: Finger-to-nose normal.Left: Finger-to-nose normal.    Gait  Casual gait is normal including stance, stride, and arm swing. Able to rise from chair without using arms.

## 2025-04-23 ENCOUNTER — OFFICE VISIT (OUTPATIENT)
Dept: NEUROLOGY | Facility: CLINIC | Age: 60
End: 2025-04-23

## 2025-04-23 VITALS
BODY MASS INDEX: 23.22 KG/M2 | WEIGHT: 136 LBS | OXYGEN SATURATION: 99 % | HEIGHT: 64 IN | HEART RATE: 93 BPM | DIASTOLIC BLOOD PRESSURE: 68 MMHG | SYSTOLIC BLOOD PRESSURE: 132 MMHG

## 2025-04-23 DIAGNOSIS — R20.2 PARESTHESIAS: ICD-10-CM

## 2025-04-23 DIAGNOSIS — M54.12 CERVICAL RADICULOPATHY: ICD-10-CM

## 2025-04-23 DIAGNOSIS — E53.8 B12 DEFICIENCY: Primary | ICD-10-CM

## 2025-04-23 DIAGNOSIS — Q28.3 CAVERNOUS MALFORMATION: ICD-10-CM

## 2025-04-23 NOTE — PROGRESS NOTES
Review of Systems   Constitutional:  Negative for appetite change, fatigue and fever.   HENT: Negative.  Negative for hearing loss, tinnitus, trouble swallowing and voice change.    Eyes: Negative.  Negative for photophobia, pain and visual disturbance.   Respiratory: Negative.  Negative for shortness of breath.    Cardiovascular: Negative.  Negative for palpitations.   Gastrointestinal: Negative.  Negative for nausea and vomiting.   Endocrine: Negative.  Negative for cold intolerance.   Genitourinary: Negative.  Negative for dysuria, frequency and urgency.   Musculoskeletal:  Positive for neck pain (slighlty worse). Negative for back pain, gait problem, myalgias and neck stiffness.   Skin: Negative.  Negative for rash.   Allergic/Immunologic: Negative.    Neurological:  Positive for numbness (b/l feet- severity  worse/ right foot primairly) and headaches (starts at the back of head 2-3 times a week). Negative for dizziness, tremors, seizures, syncope, facial asymmetry, speech difficulty, weakness and light-headedness.        Vertigo      Hematological: Negative.  Does not bruise/bleed easily.   Psychiatric/Behavioral: Negative.  Negative for confusion, hallucinations and sleep disturbance.    All other systems reviewed and are negative.

## 2025-05-24 NOTE — ASSESSMENT & PLAN NOTE
Patient with numbness and tingling of the tongue, lips, mouth-facial symptoms did improve with b12 supplementation, she remains on oral b12.  Does continue to note very occasional paresthesias of the roof of mouth and soft palate. Symptom is stable since last visit, no difficulty swallowing or changes in speech. She did have a repeat MRI brain which did show stability of  right cerebral hemisphere cavernoma and prominence of the right cerebellar medullary angle cistern suggesting the presence of a small arachnoid cyst.  Has seen neurosurgery in the past.     Again discussed the use of gabapentin for symptoms, declined today.

## 2025-06-23 DIAGNOSIS — E78.2 MIXED HYPERLIPIDEMIA: ICD-10-CM

## 2025-06-24 RX ORDER — ROSUVASTATIN CALCIUM 10 MG/1
10 TABLET, COATED ORAL DAILY
Qty: 90 TABLET | Refills: 0 | Status: SHIPPED | OUTPATIENT
Start: 2025-06-24

## 2025-06-25 NOTE — PROGRESS NOTES
Daily Note     Today's date: 2019  Patient name: Uzair Neves  : 1965  MRN: [id number]  Referring provider: Brandon Diaz DO  Dx:   Encounter Diagnosis     ICD-10-CM    1  Lumbar radiculopathy M54 16    2  Low back pain with sciatica, sciatica laterality unspecified, unspecified back pain laterality, unspecified chronicity M54 40                   Subjective: Fani Niño reported "I am doing better, I have less pain than before "      Objective: See treatment diary below      Assessment: Tolerated treatment well  Patient would benefit from continued PT  Fani Niño tried to add seated forward flexion to her therapy program, however, that increased numbness and tingling in her R foot so it was discontinued  She reported a decrease in symptoms following DKTC and IASTM to her lumbar spinae  Plan: Continue per plan of care        Precautions: none, extension bias      Manual  2019          IASTM   JR                                                                  Exercise Diary              REIP             TrA  3x10 3x10          TrA leg fall outs  3x10 3" 3x10 3"          DKTC  3x10 3x10          TrA leg pick ups   3x10x2          Seated flexion   D/Tien                                                                                                                                                                                   HEP teaching and return demonstration JR                Modalities Yes

## 2025-08-01 DIAGNOSIS — K21.9 GASTROESOPHAGEAL REFLUX DISEASE WITHOUT ESOPHAGITIS: ICD-10-CM

## 2025-08-01 RX ORDER — OMEPRAZOLE 20 MG/1
20 CAPSULE, DELAYED RELEASE ORAL DAILY
Qty: 90 CAPSULE | Refills: 1 | Status: SHIPPED | OUTPATIENT
Start: 2025-08-01